# Patient Record
Sex: MALE | Race: WHITE | Employment: UNEMPLOYED | URBAN - METROPOLITAN AREA
[De-identification: names, ages, dates, MRNs, and addresses within clinical notes are randomized per-mention and may not be internally consistent; named-entity substitution may affect disease eponyms.]

---

## 2017-01-06 ENCOUNTER — OFFICE VISIT (OUTPATIENT)
Dept: CARDIOLOGY CLINIC | Age: 52
End: 2017-01-06

## 2017-01-06 VITALS
DIASTOLIC BLOOD PRESSURE: 84 MMHG | SYSTOLIC BLOOD PRESSURE: 130 MMHG | WEIGHT: 315 LBS | BODY MASS INDEX: 40.43 KG/M2 | OXYGEN SATURATION: 96 % | HEIGHT: 74 IN | HEART RATE: 71 BPM

## 2017-01-06 DIAGNOSIS — E66.01 MORBID OBESITY DUE TO EXCESS CALORIES (HCC): ICD-10-CM

## 2017-01-06 DIAGNOSIS — E78.49 OTHER HYPERLIPIDEMIA: ICD-10-CM

## 2017-01-06 DIAGNOSIS — F17.200 TOBACCO DEPENDENCE: ICD-10-CM

## 2017-01-06 DIAGNOSIS — M25.561 CHRONIC PAIN OF BOTH KNEES: ICD-10-CM

## 2017-01-06 DIAGNOSIS — M25.562 CHRONIC PAIN OF BOTH KNEES: ICD-10-CM

## 2017-01-06 DIAGNOSIS — J41.0 SIMPLE CHRONIC BRONCHITIS (HCC): ICD-10-CM

## 2017-01-06 DIAGNOSIS — R93.1 AGATSTON CORONARY ARTERY CALCIUM SCORE GREATER THAN 400: Primary | ICD-10-CM

## 2017-01-06 DIAGNOSIS — G89.29 CHRONIC PAIN OF BOTH KNEES: ICD-10-CM

## 2017-01-06 DIAGNOSIS — R06.09 DYSPNEA ON EXERTION: ICD-10-CM

## 2017-01-06 NOTE — MR AVS SNAPSHOT
Visit Information Date & Time Provider Department Dept. Phone Encounter #  
 1/6/2017  9:20 AM Shruthi Patel MD [facility] [address] [id number] Your Appointments 1/9/2017  9:00 AM  
Follow Up with Yissel Evangelista MD  
[address] ([facility]) Appt Note: follow up [place] [address] [address] [address]  
[phone]  
  
   
 [facility] [address] [address] Upcoming Health Maintenance Date Due Pneumococcal 19-64 Medium Risk (1 of 1 - PPSV23) 10/9/1984 DTaP/Tdap/Td series (1 - Tdap) 10/9/1986 FOBT Q 1 YEAR AGE 50-75 10/9/2015 Allergies as of 1/6/2017  Review Complete On: 1/6/2017 By: Edwin Fried Severity Noted Reaction Type Reactions Vicodin [Hydrocodone-acetaminophen] Medium 12/23/2015    Itching Current Immunizations  Never Reviewed No immunizations on file. Not reviewed this visit You Were Diagnosed With   
  
 Codes Comments [facility] coronary artery calcium score greater than 400    -  Primary ICD-10-CM: R93.1 ICD-9-CM: 793.2 Vitals BP Pulse Height(growth percentile) Weight(growth percentile) SpO2 BMI  
 130/84 71 6' 2\" (1.88 m) (!) 353 lb (160.1 kg) 96% 45.32 kg/m2 Smoking Status Current Every Day Smoker Vitals History BMI and BSA Data Body Mass Index Body Surface Area  
 45.32 kg/m 2 2.89 m 2 Preferred Pharmacy Pharmacy Name Phone [id number] [address] [phone] Your Updated Medication List  
  
   
This list is accurate as of: 1/6/17 10:16 AM.  Always use your most recent med list.  
  
  
  
  
 albuterol 90 mcg/actuation inhaler Commonly known as:  PROVENTIL HFA, VENTOLIN HFA, PROAIR HFA Take 2 Puffs by inhalation every six (6) hours as needed for Wheezing. Indications: CHRONIC OBSTRUCTIVE PULMONARY DISEASE  
  
 atorvastatin 40 mg tablet Commonly known as:  LIPITOR  
TAKE 1 TABLET BY MOUTH DAILY  
  
 budesonide-formoterol 80-4.5 mcg/actuation Hfaa inhaler Commonly known as:  SYMBICORT Take 2 Puffs by inhalation two (2) times a day. methocarbamol 750 mg tablet Commonly known as:  ROBAXIN Take 1 Tab by mouth four (4) times daily as needed. naproxen 500 mg tablet Commonly known as:  NAPROSYN Take 1 Tab by mouth two (2) times daily (with meals). omeprazole-sodium bicarbonate 40-1.1 mg-gram capsule Commonly known as:  Blondell Dine Take 1 Cap by mouth daily. phentermine 15 mg capsule Commonly known as:  ADIPEX_P Take 2 Caps by mouth every morning. Max Daily Amount: 30 mg.  
  
 sildenafil 20 mg tablet Commonly known as:  REVATIO  
1-2 tabs 30 minutes before activity  
  
 topiramate 25 mg tablet Commonly known as:  TOPAMAX Take 1 Tab by mouth two (2) times a day. varenicline 1 mg tablet Commonly known as:  Mccoy Salazar Take 1 Tab by mouth two (2) times a day. We Performed the Following AMB POC EKG ROUTINE W/ 12 LEADS, INTER & REP [12294 CPT(R)] To-Do List   
 01/06/2017 ECG:  CARDIAC SPECT REST AND STRESS   
  
 01/06/2017 ECG:  NUCLEAR STRESS TEST   
  
 01/27/2017 7:15 AM  
  Appointment with [facility] NUC CARD ROOM at [facility] NON-INVASIVE CARD ([phone]) This is a 2-part test which takes approximately 4 hours to complete. Please see part 2 of exam below for full instructions 01/27/2017 7:45 AM  
  Appointment with [employer] NUC CARD ROOM at [facility] NON-INVASIVE CARD ([phone]) 1-No eating or coffee after midnight  2-Do not take diabetic meds (bring with) 3-Please take all other meds unless specified by cardiology Referral Information Referral ID Referred By Referred To  
  
 [id number] Mayra Day Not Available Visits Status Start Date End Date 1 New Request 1/6/17 1/6/18 If your referral has a status of pending review or denied, additional information will be sent to support the outcome of this decision. Referral ID Referred By Referred To  
 [id number] Magda Amor Not Available Visits Status Start Date End Date 1 New Request 1/6/17 1/6/18 If your referral has a status of pending review or denied, additional information will be sent to support the outcome of this decision. Introducing [facility]! Dear Melva Santana: 
Thank you for requesting a InStitchu account. Our records indicate that you already have an active InStitchu account. You can access your account anytime at [email or web address] Did you know that you can access your hospital and ER discharge instructions at any time in InStitchu? You can also review all of your test results from your hospital stay or ER visit. Additional Information If you have questions, please visit the Frequently Asked Questions section of the InStitchu website at [email or web address] Remember, InStitchu is NOT to be used for urgent needs. For medical emergencies, dial 911. Now available from your iPhone and Android! Please provide this summary of care documentation to your next provider. Your primary care clinician is listed as Pola Lott. If you have any questions after today's visit, please call [phone].

## 2017-01-09 ENCOUNTER — OFFICE VISIT (OUTPATIENT)
Dept: FAMILY MEDICINE CLINIC | Age: 52
End: 2017-01-09

## 2017-01-09 VITALS
HEART RATE: 65 BPM | TEMPERATURE: 98.2 F | WEIGHT: 315 LBS | BODY MASS INDEX: 40.43 KG/M2 | DIASTOLIC BLOOD PRESSURE: 80 MMHG | RESPIRATION RATE: 18 BRPM | SYSTOLIC BLOOD PRESSURE: 144 MMHG | OXYGEN SATURATION: 94 % | HEIGHT: 74 IN

## 2017-01-09 DIAGNOSIS — E78.00 PURE HYPERCHOLESTEROLEMIA: ICD-10-CM

## 2017-01-09 DIAGNOSIS — L30.1 DYSHIDROTIC ECZEMA: Primary | ICD-10-CM

## 2017-01-09 DIAGNOSIS — K08.89 TOOTH PAIN: ICD-10-CM

## 2017-01-09 RX ORDER — TRIAMCINOLONE ACETONIDE 5 MG/G
OINTMENT TOPICAL
Qty: 30 G | Refills: 0 | Status: SHIPPED | OUTPATIENT
Start: 2017-01-09 | End: 2019-03-06 | Stop reason: SDUPTHER

## 2017-01-09 RX ORDER — ROSUVASTATIN CALCIUM 20 MG/1
20 TABLET, COATED ORAL
Qty: 30 TAB | Refills: 2 | Status: SHIPPED | OUTPATIENT
Start: 2017-01-09 | End: 2017-04-04 | Stop reason: SDUPTHER

## 2017-01-09 RX ORDER — OXYCODONE AND ACETAMINOPHEN 5; 325 MG/1; MG/1
1 TABLET ORAL
Qty: 14 TAB | Refills: 0 | Status: SHIPPED | OUTPATIENT
Start: 2017-01-09 | End: 2017-02-06 | Stop reason: SDUPTHER

## 2017-01-09 NOTE — PROGRESS NOTES
Skin Problem and Obesity        HPI: Girish Gray is a 46 y.o. male WHITE [place]   Here with itchy rash on face and scalp that comes and goes. He reports sometimes he can have a water blister that forms. Rash is not painful. Can occur at various times of year. Denies any ill feelings or fevers. He has been having severe tooth pain recently. Scheduled to have his teeth pulled soon. Past Medical History   Diagnosis Date    Asthma     GERD (gastroesophageal reflux disease)     Hypercholesterolemia 2010    Hyperlipidemia     Morbid obesity (HCC)        Current Outpatient Prescriptions   Medication Sig    topiramate (TOPAMAX) 25 mg tablet Take 1 Tab by mouth two (2) times a day.  atorvastatin (LIPITOR) 40 mg tablet TAKE 1 TABLET BY MOUTH DAILY    omeprazole-sodium bicarbonate (ZEGERID) 40-1.1 mg-gram capsule Take 1 Cap by mouth daily.  naproxen (NAPROSYN) 500 mg tablet Take 1 Tab by mouth two (2) times daily (with meals).  methocarbamol (ROBAXIN) 750 mg tablet Take 1 Tab by mouth four (4) times daily as needed. (Patient taking differently: Take 750 mg by mouth as needed.)    albuterol (PROVENTIL HFA, VENTOLIN HFA, PROAIR HFA) 90 mcg/actuation inhaler Take 2 Puffs by inhalation every six (6) hours as needed for Wheezing. Indications: CHRONIC OBSTRUCTIVE PULMONARY DISEASE    budesonide-formoterol (SYMBICORT) 80-4.5 mcg/actuation HFAA inhaler Take 2 Puffs by inhalation two (2) times a day.  phentermine (ADIPEX_P) 15 mg capsule Take 2 Caps by mouth every morning. Max Daily Amount: 30 mg. (Patient taking differently: Take 30 mg by mouth every morning. Indications: temporarily off this med)    varenicline (CHANTIX) 1 mg tablet Take 1 Tab by mouth two (2) times a day.  sildenafil (REVATIO) 20 mg tablet 1-2 tabs 30 minutes before activity     No current facility-administered medications for this visit.         Allergies   Allergen Reactions    Vicodin [Hydrocodone-Acetaminophen] Itching       Past Surgical History   Procedure Laterality Date    Hx orthopaedic Left 2003     left lateral meniscus amputated    Hx colonoscopy  12-     4 polyps removed       Family History   Problem Relation Age of Onset    Diabetes Mother     Stroke Mother     Arthritis-osteo Father     Hypertension Father     Arthritis-osteo Brother     Heart Attack Brother     Diabetes Maternal Grandmother        Social History     Social History    Marital status:      Spouse name: N/A    Number of children: N/A    Years of education: N/A     Occupational History    Not on file. Social History Main Topics    Smoking status: Current Every Day Smoker     Packs/day: 2.00     Years: 45.00     Types: Cigarettes     Start date: 1/1/1970     Last attempt to quit: 7/13/2016    Smokeless tobacco: Never Used    Alcohol use 0.0 oz/week     0 Standard drinks or equivalent per week      Comment: occasionally    Drug use: No    Sexual activity: Not on file     Other Topics Concern    Not on file     Social History Narrative           Visit Vitals    /80 (BP 1 Location: Right arm, BP Patient Position: Sitting)    Pulse 65    Temp 98.2 °F (36.8 °C) (Oral)    Resp 18    Ht 5' 2\" (1.575 m)    Wt 350 lb (158.8 kg)    SpO2 94%    BMI 64.02 kg/m2     Physical Exam   Constitutional: He is oriented to person, place, and time and well-developed, well-nourished, and in no distress. No distress. HENT:   Head: Normocephalic and atraumatic. Right Ear: External ear normal.   Left Ear: External ear normal.   Noted dental decay with loosening of incisors of lower right jaw line. Neurological: He is alert and oriented to person, place, and time. Gait normal.   Skin: Skin is warm and dry. He is not diaphoretic.    Micropapular rash on face and scalp with erythematous base and sloughing of skin noted c/w eczema   Psychiatric: Mood and affect normal.   Nursing note and vitals reviewed. Assesment:  1. Dyshidrotic eczema    - triamcinolone acetonide (KENALOG) 0.5 % ointment; Apply  to affected area two (2) times daily as needed for Skin Irritation. use thin layer  Dispense: 30 g; Refill: 0    2. Tooth pain    - oxyCODONE-acetaminophen (PERCOCET) 5-325 mg per tablet; Take 1 Tab by mouth two (2) times daily as needed for Pain. Max Daily Amount: 2 Tabs. Dispense: 14 Tab; Refill: 0      I have discussed the diagnosis with the patient and the intended management  The patient has received an after-visit summary and questions were answered concerning future plans. I have discussed medication usage, side effects and warnings with the patient as well. I have reviewed the plan of care with the patient, accepted their input and they are in agreement with the treatment goals.          Lauren Durbin MD

## 2017-01-09 NOTE — MR AVS SNAPSHOT
Visit Information Date & Time Provider Department Dept. Phone Encounter #  
 1/9/2017  9:00 AM [address] [facility], [address] [phone] [id number] Follow-up Instructions Return if symptoms worsen or fail to improve. Upcoming Health Maintenance Date Due Pneumococcal 19-64 Medium Risk (1 of 1 - PPSV23) 10/9/1984 DTaP/Tdap/Td series (1 - Tdap) 10/9/1986 FOBT Q 1 YEAR AGE 50-75 10/9/2015 Allergies as of 1/9/2017  Review Complete On: 1/9/2017 By: Eric Briggs, CNMT, RT, NM, ARRT Severity Noted Reaction Type Reactions Vicodin [Hydrocodone-acetaminophen] Medium 12/23/2015    Itching Current Immunizations  Never Reviewed No immunizations on file. Not reviewed this visit You Were Diagnosed With   
  
 Codes Comments Dyshidrotic eczema    -  Primary ICD-10-CM: L30.1 ICD-9-CM: 705.81 Tooth pain     ICD-10-CM: K08.89 ICD-9-CM: 525.9 Vitals BP Pulse Temp Resp Height(growth percentile) Weight(growth percentile) 144/80 (BP 1 Location: Right arm, BP Patient Position: Sitting) 65 98.2 °F (36.8 °C) (Oral) 18 6' 2\" (1.88 m) 350 lb (158.8 kg) SpO2 BMI Smoking Status 94% 44.94 kg/m2 Current Every Day Smoker Vitals History BMI and BSA Data Body Mass Index Body Surface Area 44.94 kg/m 2 2.88 m 2 Preferred Pharmacy Pharmacy Name Phone [id number] [address] [phone] Your Updated Medication List  
  
   
This list is accurate as of: 1/9/17  9:00 AM.  Always use your most recent med list.  
  
  
  
  
 albuterol 90 mcg/actuation inhaler Commonly known as:  PROVENTIL HFA, VENTOLIN HFA, PROAIR HFA Take 2 Puffs by inhalation every six (6) hours as needed for Wheezing. Indications: CHRONIC OBSTRUCTIVE PULMONARY DISEASE  
  
 atorvastatin 40 mg tablet Commonly known as:  LIPITOR TAKE 1 TABLET BY MOUTH DAILY  
  
 budesonide-formoterol 80-4.5 mcg/actuation Hfaa inhaler Commonly known as:  SYMBICORT Take 2 Puffs by inhalation two (2) times a day. methocarbamol 750 mg tablet Commonly known as:  ROBAXIN Take 1 Tab by mouth four (4) times daily as needed. naproxen 500 mg tablet Commonly known as:  NAPROSYN Take 1 Tab by mouth two (2) times daily (with meals). omeprazole-sodium bicarbonate 40-1.1 mg-gram capsule Commonly known as:  Nasrin Flash Take 1 Cap by mouth daily. oxyCODONE-acetaminophen 5-325 mg per tablet Commonly known as:  PERCOCET Take 1 Tab by mouth two (2) times daily as needed for Pain. Max Daily Amount: 2 Tabs. phentermine 15 mg capsule Commonly known as:  ADIPEX_P Take 2 Caps by mouth every morning. Max Daily Amount: 30 mg.  
  
 sildenafil 20 mg tablet Commonly known as:  REVATIO  
1-2 tabs 30 minutes before activity  
  
 topiramate 25 mg tablet Commonly known as:  TOPAMAX Take 1 Tab by mouth two (2) times a day. triamcinolone acetonide 0.5 % ointment Commonly known as:  KENALOG Apply  to affected area two (2) times daily as needed for Skin Irritation. use thin layer  
  
 varenicline 1 mg tablet Commonly known as:  Marrithong Cashing Take 1 Tab by mouth two (2) times a day. Prescriptions Printed Refills  
 oxyCODONE-acetaminophen (PERCOCET) 5-325 mg per tablet 0 Sig: Take 1 Tab by mouth two (2) times daily as needed for Pain. Max Daily Amount: 2 Tabs. Class: Print Route: Oral  
  
Prescriptions Sent to Pharmacy Refills  
 triamcinolone acetonide (KENALOG) 0.5 % ointment 0 Sig: Apply  to affected area two (2) times daily as needed for Skin Irritation. use thin layer Class: Normal  
 Pharmacy: [address] [place] [address] #: [phone] Route: Topical  
  
Follow-up Instructions Return if symptoms worsen or fail to improve. To-Do List   
 01/27/2017 7:15 AM  
  Appointment with [facility] NUC CARD ROOM at [facility] NON-INVASIVE CARD ([phone]) This is a 2-part test which takes approximately 4 hours to complete. Please see part 2 of exam below for full instructions 01/27/2017 7:45 AM  
  Appointment with [facility] NUC CARD ROOM at [facility] NON-INVASIVE CARD ([phone]) 1-No eating or coffee after midnight  2-Do not take diabetic meds (bring with) 3-Please take all other meds unless specified by cardiology Patient Instructions Use Cerave lotion regularly for the rash, petroleum jelly is even better. Use the steroid cream twice daily as needed for the rash Atopic Dermatitis: Care Instructions Your Care Instructions Atopic dermatitis (also called eczema) is a skin problem that causes intense itching and a red, raised rash. In severe cases, the rash develops clear fluidfilled blisters. The rash is not contagious. People with this condition seem to have very sensitive immune systems that are likely to react to things that cause allergies. The immune system is the body's way of fighting infection. There is no cure for atopic dermatitis, but you may be able to control it with care at home. Follow-up care is a key part of your treatment and safety. Be sure to make and go to all appointments, and call your doctor if you are having problems. It's also a good idea to know your test results and keep a list of the medicines you take. How can you care for yourself at home? · Use moisturizer at least twice a day. · If your doctor prescribes a cream, use it as directed. If your doctor prescribes other medicine, take it exactly as directed. · Wash the affected area with water only. Soap can make dryness and itching worse. Pat dry. · Apply a moisturizer after bathing. Use a cream such as Lubriderm, Moisturel, or Cetaphil that does not irritate the skin or cause a rash. Apply the cream while your skin is still damp after lightly drying with a towel. · Use cold, wet cloths to reduce itching. · Keep cool, and stay out of the sun. · If itching affects your normal activities, an over-the-counter antihistamine, such as diphenhydramine (Benadryl) or loratadine (Claritin) may help. Read and follow all instructions on the label. When should you call for help? Call your doctor now or seek immediate medical care if: 
· Your rash gets worse and you have a fever. · You have new blisters or bruises, or the rash spreads and looks like a sunburn. · You have signs of infection, such as: 
¨ Increased pain, swelling, warmth, or redness. ¨ Red streaks leading from the rash. ¨ Pus draining from the rash. ¨ A fever. · You have crusting or oozing sores. · You have joint aches or body aches along with your rash. Watch closely for changes in your health, and be sure to contact your doctor if: 
· Your rash does not clear up after 2 to 3 weeks of home treatment. · Itching interferes with your sleep or daily activities. Where can you learn more? Go to http://nohelia-martina.info/. Enter G409 in the search box to learn more about \"Atopic Dermatitis: Care Instructions. \" Current as of: February 5, 2016 Content Version: 11.1 © 6223-9956 ED01. Care instructions adapted under license by [employer] (which disclaims liability or warranty for this information). If you have questions about a medical condition or this instruction, always ask your healthcare professional. [place] [address] any warranty or liability for your use of this information. Introducing [facility]! Dear Zoraida Taylor: 
Thank you for requesting a ERUCES account. Our records indicate that you already have an active ERUCES account. You can access your account anytime at [email or web address] Did you know that you can access your hospital and ER discharge instructions at any time in Pokelabo? You can also review all of your test results from your hospital stay or ER visit. Additional Information If you have questions, please visit the Frequently Asked Questions section of the Pokelabo website at [email or web address] Remember, Pokelabo is NOT to be used for urgent needs. For medical emergencies, dial 911. Now available from your iPhone and Android! Please provide this summary of care documentation to your next provider. Your primary care clinician is listed as Yissel Evangelista. If you have any questions after today's visit, please call [phone].

## 2017-01-09 NOTE — PATIENT INSTRUCTIONS
Use Cerave lotion regularly for the rash, petroleum jelly is even better. Use the steroid cream twice daily as needed for the rash    Atopic Dermatitis: Care Instructions  Your Care Instructions  Atopic dermatitis (also called eczema) is a skin problem that causes intense itching and a red, raised rash. In severe cases, the rash develops clear fluid-filled blisters. The rash is not contagious. People with this condition seem to have very sensitive immune systems that are likely to react to things that cause allergies. The immune system is the body's way of fighting infection. There is no cure for atopic dermatitis, but you may be able to control it with care at home. Follow-up care is a key part of your treatment and safety. Be sure to make and go to all appointments, and call your doctor if you are having problems. It's also a good idea to know your test results and keep a list of the medicines you take. How can you care for yourself at home? · Use moisturizer at least twice a day. · If your doctor prescribes a cream, use it as directed. If your doctor prescribes other medicine, take it exactly as directed. · Wash the affected area with water only. Soap can make dryness and itching worse. Pat dry. · Apply a moisturizer after bathing. Use a cream such as Lubriderm, Moisturel, or Cetaphil that does not irritate the skin or cause a rash. Apply the cream while your skin is still damp after lightly drying with a towel. · Use cold, wet cloths to reduce itching. · Keep cool, and stay out of the sun. · If itching affects your normal activities, an over-the-counter antihistamine, such as diphenhydramine (Benadryl) or loratadine (Claritin) may help. Read and follow all instructions on the label. When should you call for help? Call your doctor now or seek immediate medical care if:  · Your rash gets worse and you have a fever.   · You have new blisters or bruises, or the rash spreads and looks like a sunburn. · You have signs of infection, such as:  ¨ Increased pain, swelling, warmth, or redness. ¨ Red streaks leading from the rash. ¨ Pus draining from the rash. ¨ A fever. · You have crusting or oozing sores. · You have joint aches or body aches along with your rash. Watch closely for changes in your health, and be sure to contact your doctor if:  · Your rash does not clear up after 2 to 3 weeks of home treatment. · Itching interferes with your sleep or daily activities. Where can you learn more? Go to http://nohelia-martina.info/. Enter M347 in the search box to learn more about \"Atopic Dermatitis: Care Instructions. \"  Current as of: February 5, 2016  Content Version: 11.1  © 6211-9177 [employer]. Care instructions adapted under license by [employer] (which disclaims liability or warranty for this information). If you have questions about a medical condition or this instruction, always ask your healthcare professional. [place] [address] any warranty or liability for your use of this information.

## 2017-01-09 NOTE — PROGRESS NOTES
Jyotsna Patrick is a 46 y.o. male here for follow up. Has been to Cardiology. Has spots on face that needs looks at, they do itch and they come and go.

## 2017-01-10 PROBLEM — R06.09 DYSPNEA ON EXERTION: Status: ACTIVE | Noted: 2017-01-10

## 2017-01-10 NOTE — PROGRESS NOTES
PATIENT NAME: Eloy Weiner         46 y.o.      1965              DATE:1/6/2017    REASON FOR VISIT:abnormal coronary  Calcium score    HISTORY OF PRESENT ILLNESS: Cardiac CT was obtained recently for risk factor stratification. Coronary calcium score was 1600. Referred for evaluation. Denies a prior history of coronary artery disease. He is chronically short of breath on exertion. He does smoke cigarettes heavily for years and has a history of chronic obstructive pulmonary disease. Denies chest pain. Denies orthopnea and paroxysmal nocturnal dyspnea. Denies palpitation, syncope, presyncope. Denies edema in the lower extremities. The patient is not hypertensive or diabetic. He does take a statin for hyperlipidemia. He is morbidly obese.     PAST MEDICAL HISTORY:   Past Medical History:    Asthma                                                        GERD (gastroesophageal reflux disease)                        Hypercholesterolemia                            2010          Hyperlipidemia                                                Morbid obesity ([facility] [address]                                          PAST SURGICAL HISTORY:   Past Surgical History:    HX ORTHOPAEDIC                                  Left 2003            Comment:left lateral meniscus amputated    HX COLONOSCOPY                                   12-      Comment:4 polyps removed      SOCIAL HISTORY:  Social History    Marital status:              Spouse name:                       Years of education:                 Number of children:               Social History Main Topics    Smoking status: Current Every Day Smoker                                                     Packs/day: 2.00      Years: 45.00          Types: Cigarettes       Start date: 1/1/1970       Last attempt to quit: 7/13/2016    Smokeless status: Never Used                        Alcohol use: Yes           0.0 oz/week       0 Standard drinks or equivalent per week       Comment: occasionally    Drug use: No                ALLERGIES:    -- Vicodin [Hydrocodone-Acetaminophen] -- Itching     CURRENT MEDICATIONS:   Current Outpatient Prescriptions:  topiramate (TOPAMAX) 25 mg tablet, Take 1 Tab by mouth two (2) times a day. omeprazole-sodium bicarbonate (ZEGERID) 40-1.1 mg-gram capsule, Take 1 Cap by mouth daily. naproxen (NAPROSYN) 500 mg tablet, Take 1 Tab by mouth two (2) times daily (with meals). methocarbamol (ROBAXIN) 750 mg tablet, Take 1 Tab by mouth four (4) times daily as needed. (Patient taking differently: Take 750 mg by mouth as needed.)  sildenafil (REVATIO) 20 mg tablet, 1-2 tabs 30 minutes before activity  albuterol (PROVENTIL HFA, VENTOLIN HFA, PROAIR HFA) 90 mcg/actuation inhaler, Take 2 Puffs by inhalation every six (6) hours as needed for Wheezing. Indications: CHRONIC OBSTRUCTIVE PULMONARY DISEASE  oxyCODONE-acetaminophen (PERCOCET) 5-325 mg per tablet, Take 1 Tab by mouth two (2) times daily as needed for Pain. Max Daily Amount: 2 Tabs. triamcinolone acetonide (KENALOG) 0.5 % ointment, Apply  to affected area two (2) times daily as needed for Skin Irritation. use thin layer  rosuvastatin (CRESTOR) 20 mg tablet, Take 1 Tab by mouth nightly. phentermine (ADIPEX_P) 15 mg capsule, Take 2 Caps by mouth every morning. Max Daily Amount: 30 mg. (Patient taking differently: Take 30 mg by mouth every morning. Indications: temporarily off this med)  varenicline (CHANTIX) 1 mg tablet, Take 1 Tab by mouth two (2) times a day. budesonide-formoterol (SYMBICORT) 80-4.5 mcg/actuation HFAA inhaler, Take 2 Puffs by inhalation two (2) times a day. No current facility-administered medications for this visit.        REVIEW of SYSTEMS:History obtained from chart review and the patient  General ROS: negative for - weight gain or weight loss  Hematological and Lymphatic ROS: negative for - bleeding problems  Respiratory ROS: Please see history of present illness  Cardiovascular ROS: Please see history of present illness  Musculoskeletal  Unable to ambulate on treadmill because of knee discomfort  Neurological ROS: no TIA or stroke symptoms     PHYSICAL EXAMINATION:   /84  Pulse 71  Ht 6' 2\" (1.88 m)  Wt (!) 160.1 kg (353 lb)  SpO2 96%  BMI 45.32 kg/m2  BP Readings from Last 3 Encounters:  01/09/17 : 144/80  01/06/17 : 130/84  12/27/16 : 120/88    Pulse Readings from Last 3 Encounters:  01/09/17 : 65  01/06/17 : 71  12/27/16 : 83    Wt Readings from Last 3 Encounters:  01/09/17 : 158.8 kg (350 lb)  01/06/17 : (!) 160.1 kg (353 lb)  12/27/16 : (!) 159.8 kg (352 lb 6.4 oz)    Gen. : obese white male NAD. HEENT: Sclera clear. Mucous membranes pink and moist.  Neck: No jugular venous distention. Carotid upstrokes 2+ without bruits. Chest: Scattered wheezes. Heart: PMI not palpable. Regular rhythm. No murmur or gallop. Abdomen: obese unable to palpate abdominal aorta. Extremities: No edema. .  Skin: Warm and dry. No stasis changes. Neuro: Alert, oriented, speech WNL, no facial asymmetry. Gait WNL. EKG: Within normal limits    IMPRESSION:   Abnormal coronary calcium score  Hyperlipidemia  Tobacco abuse  Positive family history for coronary artery disease  Exertional shortness of breath unknown etiology  Degenerative joint disease both knees  Chronic obstructive pulmonary disease  Morbid obesity    PLAN:  The patient needs to be screened for coronary artery disease. He will not be able to ambulate on a treadmill because of his degenerative joint disease and shortness of breath. Have ordered pharmacologic stress testing and Cardiolite. The diagnoses and plan were discussed with patient and spouse. All questions answered. Plan of care agreed to by all concerned. Yadi Graham MD       ,

## 2017-01-13 DIAGNOSIS — K21.9 GASTROESOPHAGEAL REFLUX DISEASE WITHOUT ESOPHAGITIS: ICD-10-CM

## 2017-01-13 RX ORDER — OMEPRAZOLE AND SODIUM BICARBONATE 40; 1100 MG/1; MG/1
CAPSULE ORAL
Qty: 30 CAP | Refills: 2 | Status: SHIPPED | OUTPATIENT
Start: 2017-01-13 | End: 2017-02-06 | Stop reason: ALTCHOICE

## 2017-01-25 ENCOUNTER — TELEPHONE (OUTPATIENT)
Dept: FAMILY MEDICINE CLINIC | Age: 52
End: 2017-01-25

## 2017-01-25 NOTE — TELEPHONE ENCOUNTER
Patient's wife called stating  would like a different bariatric surgeon. The one at [facility] will not preform the surgery until patient has not smoked in 3 months. Mr. Dago Garduno states he has done everything asked of him by trying meds and going to the nutritionist. Nothing he has tried has worked to help him stop smoking. Patient is very upset and frustrated. Please advise.

## 2017-01-27 ENCOUNTER — HOSPITAL ENCOUNTER (OUTPATIENT)
Dept: NON INVASIVE DIAGNOSTICS | Age: 52
Discharge: HOME | End: 2017-01-27
Payer: COMMERCIAL

## 2017-01-27 DIAGNOSIS — R93.1 AGATSTON CORONARY ARTERY CALCIUM SCORE GREATER THAN 400: ICD-10-CM

## 2017-01-27 LAB
ATTENDING PHYSICIAN, CST07: NORMAL
DIAGNOSIS, 93000: NORMAL
DUKE TM SCORE RESULT, CST14: NORMAL
DUKE TREADMILL SCORE, CST13: NORMAL
ECG INTERP BEFORE EX, CST11: NORMAL
ECG INTERP DURING EX, CST12: NORMAL
FUNCTIONAL CAPACITY, CST17: NORMAL
KNOWN CARDIAC CONDITION, CST08: NORMAL
MAX. DIASTOLIC BP, CST04: 60 MMHG
MAX. HEART RATE, CST05: 85 BPM
MAX. SYSTOLIC BP, CST03: 162 MMHG
OVERALL BP RESPONSE TO EXERCISE, CST16: NORMAL
OVERALL HR RESPONSE TO EXERCISE, CST15: NORMAL
PEAK EX METS, CST10: 1 METS
PROTOCOL NAME, CST01: NORMAL
TEST INDICATION, CST09: NORMAL

## 2017-01-27 PROCEDURE — A9500 TC99M SESTAMIBI: HCPCS

## 2017-01-27 PROCEDURE — 74011250636 HC RX REV CODE- 250/636

## 2017-01-27 PROCEDURE — 78452 HT MUSCLE IMAGE SPECT MULT: CPT

## 2017-01-27 PROCEDURE — 93017 CV STRESS TEST TRACING ONLY: CPT

## 2017-01-27 RX ORDER — SODIUM CHLORIDE 9 MG/ML
250 INJECTION, SOLUTION INTRAVENOUS ONCE
Status: COMPLETED | OUTPATIENT
Start: 2017-01-27 | End: 2017-01-27

## 2017-01-27 RX ADMIN — SODIUM CHLORIDE 250 ML/HR: 900 INJECTION, SOLUTION INTRAVENOUS at 09:10

## 2017-01-27 RX ADMIN — REGADENOSON 0.4 MG: 0.08 INJECTION, SOLUTION INTRAVENOUS at 09:10

## 2017-01-27 NOTE — PROCEDURES
[address] STRESS    Name:  Skip Azar  MR#:  [id number]  :  1965  Account #:  [de-identified]  Date of Adm:  2017  Date of Service:  2017      PERFORMING PHYSICIAN: Avril Chacon MD    REQUESTING PHYSICIAN: Mayra Haider MD    DATE OF PROCEDURE: 2017    CLINICAL HISTORY: A 20-year-old man with no known coronary  artery disease. CARDIAC RISK FACTORS INCLUDE: Tobacco.    CURRENT SYMPTOMS INCLUDE: Elevated calcium scoring. PROCEDURE IN DETAIL: After obtaining appropriate informed  consent, pharmacologic stress testing was performed using Lexiscan  0.4 mg intravenously. The patient developed no unusual symptoms. REASON FOR TERMINATION: End of protocol reached. The resting  electrocardiogram was normal sinus rhythm, normal. The  stress electrocardiogram, normal sinus rhythm, normal.    MYOCARDIAL PERFUSION IMAGING: Performed at rest 30 minutes  following injection of 10.75 mCi of sestamibi. At peak pharmacologic  effect, the patient was injected with 33.0 mCi of sestamibi. Gated post  stress tomographic imaging was performed 45 minutes after stress. FINDINGS: The overall quality of the study was fair. Splanchnic activity  was mild. Soft tissue attenuation was mild. Left ventricular cavity was noted to be normal in size in the rest and  stress studies. The right ventricle was unremarkable. Stress SPECT  imaging demonstrated homogeneous uptake of cardiac tracer  throughout the myocardium. The rest images were similar. Gated  SPECT imaging showed normal wall motion. The left ventricular  ejection fraction was calculated to be 60%. IMPRESSION  1. Myocardial perfusion imaging is normal.  2. There is no area of prior scarring or ongoing ischemia. 3. Overall left ventricular systolic function was normal without regional  wall motion abnormalities. 4. Low risk for ischemia.     FOLLOWUP PLAN/RECOMMENDATION: Per  Santos.         MD BRYAN Arauz / JORDIN  D:  01/27/2017   15:19  T:  01/27/2017   17:55  Job #:  [id number]

## 2017-01-30 NOTE — TELEPHONE ENCOUNTER
Sent message to Dr Kori Castellano, [facility] bariatric surgeon to see if she could see Mr Dago Garduno.     Krystal Grove MD

## 2017-01-31 ENCOUNTER — TELEPHONE (OUTPATIENT)
Dept: FAMILY MEDICINE CLINIC | Age: 52
End: 2017-01-31

## 2017-01-31 NOTE — TELEPHONE ENCOUNTER
Dr Rajwinder Mtz stated that nicotine use is a contraindication to GB surgery, so that means no surgeon will do the surgery.     Thanks,  Rosie Leon MD

## 2017-01-31 NOTE — TELEPHONE ENCOUNTER
Wife called to inquire about [facility] nicotine policy for bariatric surgery. She was made aware of their 3 month nicotine free policy. She asked that we try [facility]. Wife was upset on the phone stating her frustration with  and how his weight is interfering in their personal life. She is aware that we will try [facility] and will call her back.

## 2017-02-06 ENCOUNTER — OFFICE VISIT (OUTPATIENT)
Dept: FAMILY MEDICINE CLINIC | Age: 52
End: 2017-02-06

## 2017-02-06 VITALS
TEMPERATURE: 96 F | BODY MASS INDEX: 40.43 KG/M2 | WEIGHT: 315 LBS | HEART RATE: 86 BPM | OXYGEN SATURATION: 96 % | HEIGHT: 74 IN | RESPIRATION RATE: 18 BRPM | DIASTOLIC BLOOD PRESSURE: 83 MMHG | SYSTOLIC BLOOD PRESSURE: 166 MMHG

## 2017-02-06 DIAGNOSIS — K21.9 GASTROESOPHAGEAL REFLUX DISEASE WITHOUT ESOPHAGITIS: ICD-10-CM

## 2017-02-06 DIAGNOSIS — L02.415 CUTANEOUS ABSCESS OF RIGHT LOWER EXTREMITY: Primary | ICD-10-CM

## 2017-02-06 RX ORDER — OXYCODONE AND ACETAMINOPHEN 5; 325 MG/1; MG/1
1 TABLET ORAL
Qty: 14 TAB | Refills: 0 | Status: SHIPPED | OUTPATIENT
Start: 2017-02-06 | End: 2017-05-11 | Stop reason: SDUPTHER

## 2017-02-06 RX ORDER — SUCRALFATE 1 G/10ML
1 SUSPENSION ORAL 2 TIMES DAILY
Qty: 414 ML | Refills: 1 | Status: SHIPPED | OUTPATIENT
Start: 2017-02-06 | End: 2017-04-05

## 2017-02-06 RX ORDER — OMEPRAZOLE 40 MG/1
40 CAPSULE, DELAYED RELEASE ORAL DAILY
Qty: 30 CAP | Refills: 1 | Status: SHIPPED | OUTPATIENT
Start: 2017-02-06 | End: 2017-04-04 | Stop reason: SDUPTHER

## 2017-02-06 RX ORDER — SULFAMETHOXAZOLE AND TRIMETHOPRIM 800; 160 MG/1; MG/1
1 TABLET ORAL 2 TIMES DAILY
Qty: 14 TAB | Refills: 0 | Status: SHIPPED | OUTPATIENT
Start: 2017-02-06 | End: 2017-02-13

## 2017-02-06 NOTE — MR AVS SNAPSHOT
Visit Information Date & Time Provider Department Dept. Phone Encounter #  
 2/6/2017  1:45 PM [phone] [id number] Follow-up Instructions Return in about 2 days (around 2/8/2017) for recheck skin abscess. Your Appointments 2/8/2017  8:15 AM  
Follow Up with Juanjo Donovan MD  
[facility]) Appt Note: Return in about 2 days (around 2/8/2017) for recheck skin abscess. [facility] [address] [address]  
[phone]  
  
   
 [facility] [address] [address] Upcoming Health Maintenance Date Due Pneumococcal 19-64 Medium Risk (1 of 1 - PPSV23) 10/9/1984 DTaP/Tdap/Td series (1 - Tdap) 10/9/1986 FOBT Q 1 YEAR AGE 50-75 10/9/2015 Allergies as of 2/6/2017  Review Complete On: 2/6/2017 By: Jolanta Howe, JOLENEMT, RT, NM, ARRT Severity Noted Reaction Type Reactions Vicodin [Hydrocodone-acetaminophen] Medium 12/23/2015    Itching Current Immunizations  Never Reviewed No immunizations on file. Not reviewed this visit You Were Diagnosed With   
  
 Codes Comments Cutaneous abscess of right lower extremity    -  Primary ICD-10-CM: P42.166 ICD-9-CM: 682.6 Gastroesophageal reflux disease without esophagitis     ICD-10-CM: K21.9 ICD-9-CM: 530.81 Vitals BP Pulse Temp Resp Height(growth percentile) Weight(growth percentile) 166/83 (BP 1 Location: Right arm, BP Patient Position: Sitting) 86 96 °F (35.6 °C) (Oral) 18 6' 2\" (1.88 m) (!) 352 lb (159.7 kg) SpO2 BMI Smoking Status 96% 45.19 kg/m2 Current Every Day Smoker Vitals History BMI and BSA Data Body Mass Index Body Surface Area  
 45.19 kg/m 2 2.89 m 2 Preferred Pharmacy Pharmacy Name Phone  [id number] [address] [phone] Your Updated Medication List  
  
   
This list is accurate as of: 2/6/17  2:25 PM.  Always use your most recent med list.  
  
  
  
  
 albuterol 90 mcg/actuation inhaler Commonly known as:  PROVENTIL HFA, VENTOLIN HFA, PROAIR HFA Take 2 Puffs by inhalation every six (6) hours as needed for Wheezing. Indications: CHRONIC OBSTRUCTIVE PULMONARY DISEASE  
  
 budesonide-formoterol 80-4.5 mcg/actuation Hfaa inhaler Commonly known as:  SYMBICORT Take 2 Puffs by inhalation two (2) times a day. methocarbamol 750 mg tablet Commonly known as:  ROBAXIN Take 1 Tab by mouth four (4) times daily as needed. naproxen 500 mg tablet Commonly known as:  NAPROSYN Take 1 Tab by mouth two (2) times daily (with meals). omeprazole-sodium bicarbonate 40-1.1 mg-gram capsule Commonly known as:  ZEGERID  
TAKE 1 CAPSULE BY MOUTH DAILY  
  
 oxyCODONE-acetaminophen 5-325 mg per tablet Commonly known as:  PERCOCET Take 1 Tab by mouth two (2) times daily as needed for Pain. Max Daily Amount: 2 Tabs. phentermine 15 mg capsule Commonly known as:  ADIPEX_P Take 2 Caps by mouth every morning. Max Daily Amount: 30 mg.  
  
 rosuvastatin 20 mg tablet Commonly known as:  CRESTOR Take 1 Tab by mouth nightly. sildenafil 20 mg tablet Commonly known as:  REVATIO  
1-2 tabs 30 minutes before activity  
  
 topiramate 25 mg tablet Commonly known as:  TOPAMAX Take 1 Tab by mouth two (2) times a day. triamcinolone acetonide 0.5 % ointment Commonly known as:  KENALOG Apply  to affected area two (2) times daily as needed for Skin Irritation. use thin layer  
  
 trimethoprim-sulfamethoxazole 160-800 mg per tablet Commonly known as:  BACTRIM DS, SEPTRA DS Take 1 Tab by mouth two (2) times a day for 7 days. varenicline 1 mg tablet Commonly known as:  Lorry Balzarine Take 1 Tab by mouth two (2) times a day. Prescriptions Printed Refills  
 oxyCODONE-acetaminophen (PERCOCET) 5-325 mg per tablet 0 Sig: Take 1 Tab by mouth two (2) times daily as needed for Pain. Max Daily Amount: 2 Tabs. Class: Print Route: Oral  
  
Prescriptions Sent to Pharmacy Refills  
 trimethoprim-sulfamethoxazole (BACTRIM DS, SEPTRA DS) 160-800 mg per tablet 0 Sig: Take 1 Tab by mouth two (2) times a day for 7 days. Class: Normal  
 Pharmacy: [address] [place], [place] [address] [facility] #: [phone] Route: Oral  
  
Follow-up Instructions Return in about 2 days (around 2/8/2017) for recheck skin abscess. Patient Instructions Keep wound covered and dry. Use warm compress for 10 minutes every 2-3 hours. Follow up in 2 days Skin Abscess: Care Instructions Your Care Instructions A skin abscess is a bacterial infection that forms a pocket of pus. A boil is a kind of skin abscess. The doctor may have cut an opening in the abscess so that the pus can drain out. You may have gauze in the cut so that the abscess will stay open and keep draining. You may need antibiotics. You will need to follow up with your doctor to make sure the infection has gone away. The doctor has checked you carefully, but problems can develop later. If you notice any problems or new symptoms, get medical treatment right away. Follow-up care is a key part of your treatment and safety. Be sure to make and go to all appointments, and call your doctor if you are having problems. It's also a good idea to know your test results and keep a list of the medicines you take. How can you care for yourself at home? · Apply warm and dry compresses, a heating pad set on low, or a hot water bottle 3 or 4 times a day for pain. Keep a cloth between the heat source and your skin. · If your doctor prescribed antibiotics, take them as directed.  Do not stop taking them just because you feel better. You need to take the full course of antibiotics. · Take pain medicines exactly as directed. ¨ If the doctor gave you a prescription medicine for pain, take it as prescribed. ¨ If you are not taking a prescription pain medicine, ask your doctor if you can take an over-the-counter medicine. · Keep your bandage clean and dry. Change the bandage whenever it gets wet or dirty, or at least one time a day. · If the abscess was packed with gauze: 
¨ Keep follow-up appointments to have the gauze changed or removed. If the doctor instructed you to remove the gauze, gently pull out all of the gauze when your doctor tells you to. ¨ After the gauze is removed, soak the area in warm water for 15 to 20 minutes 2 times a day, until the wound closes. When should you call for help? Call your doctor now or seek immediate medical care if: 
· You have signs of worsening infection, such as: 
¨ Increased pain, swelling, warmth, or redness. ¨ Red streaks leading from the infected skin. ¨ Pus draining from the wound. ¨ A fever. Watch closely for changes in your health, and be sure to contact your doctor if: 
· You do not get better as expected. Where can you learn more? Go to http://nohelia-martina.info/. Enter N792 in the search box to learn more about \"Skin Abscess: Care Instructions. \" Current as of: February 5, 2016 Content Version: 11.1 © 8106-2120 [employer]. Care instructions adapted under license by [employer] (which disclaims liability or warranty for this information). If you have questions about a medical condition or this instruction, always ask your healthcare professional. [place] [address] any warranty or liability for your use of this information. Introducing [facility] HEALTH [facility]! Dear Roxy Ramesh: 
Thank you for requesting a [employer] account.   Our records indicate that you already have an active [employer] account. You can access your account anytime at [email or web address] Did you know that you can access your hospital and ER discharge instructions at any time in [employer]? You can also review all of your test results from your hospital stay or ER visit. Additional Information If you have questions, please visit the Frequently Asked Questions section of the [employer] website at [email or web address] Remember, [employer] is NOT to be used for urgent needs. For medical emergencies, dial 911. Now available from your iPhone and Android! Please provide this summary of care documentation to your next provider. Your primary care clinician is listed as Yissel Evangelista. If you have any questions after today's visit, please call [phone].

## 2017-02-06 NOTE — PROGRESS NOTES
Skin Problem        HPI: Marli Domingo is a 46 y.o. male WHITE OR   Here with painful lump in right groin. He reports it started 2 days ago. Has progressively gotten bigger, very tender. He has used warm compresses hoping this would help, it has not. He has had these in the past. He denies any fevers. Past Medical History   Diagnosis Date    Asthma     GERD (gastroesophageal reflux disease)     Hypercholesterolemia 2010    Hyperlipidemia     Morbid obesity (HCC)        Current Outpatient Prescriptions   Medication Sig    oxyCODONE-acetaminophen (PERCOCET) 5-325 mg per tablet Take 1 Tab by mouth two (2) times daily as needed for Pain. Max Daily Amount: 2 Tabs.  trimethoprim-sulfamethoxazole (BACTRIM DS, SEPTRA DS) 160-800 mg per tablet Take 1 Tab by mouth two (2) times a day for 7 days.  omeprazole (PRILOSEC) 40 mg capsule Take 1 Cap by mouth daily.  sucralfate (CARAFATE) 100 mg/mL suspension Take 10 mL by mouth two (2) times a day.  triamcinolone acetonide (KENALOG) 0.5 % ointment Apply  to affected area two (2) times daily as needed for Skin Irritation. use thin layer    rosuvastatin (CRESTOR) 20 mg tablet Take 1 Tab by mouth nightly.  phentermine (ADIPEX_P) 15 mg capsule Take 2 Caps by mouth every morning. Max Daily Amount: 30 mg. (Patient taking differently: Take 30 mg by mouth every morning. Indications: temporarily off this med)    naproxen (NAPROSYN) 500 mg tablet Take 1 Tab by mouth two (2) times daily (with meals).  methocarbamol (ROBAXIN) 750 mg tablet Take 1 Tab by mouth four (4) times daily as needed. (Patient taking differently: Take 750 mg by mouth as needed.)    sildenafil (REVATIO) 20 mg tablet 1-2 tabs 30 minutes before activity    albuterol (PROVENTIL HFA, VENTOLIN HFA, PROAIR HFA) 90 mcg/actuation inhaler Take 2 Puffs by inhalation every six (6) hours as needed for Wheezing.  Indications: CHRONIC OBSTRUCTIVE PULMONARY DISEASE    budesonide-formoterol (SYMBICORT) 80-4.5 mcg/actuation HFAA inhaler Take 2 Puffs by inhalation two (2) times a day.  topiramate (TOPAMAX) 25 mg tablet Take 1 Tab by mouth two (2) times daily (with meals).  varenicline (CHANTIX) 1 mg tablet Take 1 Tab by mouth two (2) times a day. No current facility-administered medications for this visit. Allergies   Allergen Reactions    Vicodin [Hydrocodone-Acetaminophen] Itching       Past Surgical History   Procedure Laterality Date    Hx orthopaedic Left 2003     left lateral meniscus amputated    Hx colonoscopy  12-     4 polyps removed       Family History   Problem Relation Age of Onset    Diabetes Mother     Stroke Mother     Arthritis-osteo Father     Hypertension Father     Arthritis-osteo Brother     Heart Attack Brother     Diabetes Maternal Grandmother        Social History     Social History    Marital status:      Spouse name: N/A    Number of children: N/A    Years of education: N/A     Occupational History    Not on file. Social History Main Topics    Smoking status: Current Every Day Smoker     Packs/day: 2.00     Years: 45.00     Types: Cigarettes     Start date: 1/1/1970     Last attempt to quit: 7/13/2016    Smokeless tobacco: Never Used    Alcohol use 0.0 oz/week     0 Standard drinks or equivalent per week      Comment: occasionally    Drug use: No    Sexual activity: Not on file     Other Topics Concern    Not on file     Social History Narrative         Visit Vitals    /83 (BP 1 Location: Right arm, BP Patient Position: Sitting)    Pulse 86    Temp 96 °F (35.6 °C) (Oral)    Resp 18    Ht 6' 2\" (1.88 m)    Wt (!) 352 lb (159.7 kg)    SpO2 96%    BMI 45.19 kg/m2       Physical Exam   Constitutional: He is oriented to person, place, and time. He appears well-developed and well-nourished. HENT:   Head: Normocephalic and atraumatic.    Right Ear: External ear normal.   Left Ear: External ear normal. Neurological: He is alert and oriented to person, place, and time. Skin: Skin is warm. Purpura: large 4 x6 cm indurated, round lesion in right inner groin, warm, TTP, central area of fluctuance. Psychiatric: He has a normal mood and affect. Nursing note and vitals reviewed. [facility]  OFFICE PROCEDURE PROGRESS NOTE        Chart reviewed for the following:   Suzan Montemayor MD, have reviewed the History, Physical and updated the Allergic reactions for [place] performed immediately prior to start of procedure:   Suzan Montemayor MD, have performed the following reviews on Rupert Oliva prior to the start of the procedure:            * Patient was identified by name and date of birth   * Agreement on procedure being performed was verified  * Patient Allergies Verified  * Risks and Benefits explained to the patient  * Procedure site verified and marked as necessary  * Patient was positioned for comfort  * Consent was signed and verified     Time: 5940      Date of procedure: 2/6/2017    Procedure performed by:  Yissel Evangelista MD    Provider assisted by: Ethan Matt RN    Patient assisted by: self    How tolerated by patient: tolerated the procedure well with no complications    Pre-procedure pain level 10, Post-Procedure pain level 8    Comments: none    Patient education provided     Post procedure care instructions document provided to patient    After obtaining consent,skin was cleansed with betadine solution, skin sprayed with ethyl chloride and anesthetized with intradermal lidocain1% with epinephrine. 3 mL used to obtain skin anesthesia. Skin again sprayed with ethyl chloride spray and 1 cm stab incision with #10 blade made. 3mL of seropurulent material was expressed from wound. Wound was packed with 6cm on 1/4\" iodoform guaze. Wound covered with gauze dressing and paper tape. Patient tolerated procedure well. Assesment:  1. Cutaneous abscess of right lower extremity  Continue to apply warm compress, squeeze edges, keep packing in place  - oxyCODONE-acetaminophen (PERCOCET) 5-325 mg per tablet; Take 1 Tab by mouth two (2) times daily as needed for Pain. Max Daily Amount: 2 Tabs. Dispense: 14 Tab; Refill: 0  - trimethoprim-sulfamethoxazole (BACTRIM DS, SEPTRA DS) 160-800 mg per tablet; Take 1 Tab by mouth two (2) times a day for 7 days. Dispense: 14 Tab; Refill: 0    2. Gastroesophageal reflux disease without esophagitis    - omeprazole (PRILOSEC) 40 mg capsule; Take 1 Cap by mouth daily. Dispense: 30 Cap; Refill: 1  - sucralfate (CARAFATE) 100 mg/mL suspension; Take 10 mL by mouth two (2) times a day. Dispense: 414 mL; Refill: 1        I have discussed the diagnosis with the patient and the intended management  The patient has received an after-visit summary and questions were answered concerning future plans. I have discussed medication usage, side effects and warnings with the patient as well. I have reviewed the plan of care with the patient, accepted their input and they are in agreement with the treatment goals. Follow-up Disposition:  Return in about 2 days (around 2/8/2017) for recheck skin abscess.       Yissel Evangelista MD                .

## 2017-02-06 NOTE — PATIENT INSTRUCTIONS
Keep wound covered and dry. Use warm compress for 10 minutes every 2-3 hours. Follow up in 2 days    Skin Abscess: Care Instructions  Your Care Instructions    A skin abscess is a bacterial infection that forms a pocket of pus. A boil is a kind of skin abscess. The doctor may have cut an opening in the abscess so that the pus can drain out. You may have gauze in the cut so that the abscess will stay open and keep draining. You may need antibiotics. You will need to follow up with your doctor to make sure the infection has gone away. The doctor has checked you carefully, but problems can develop later. If you notice any problems or new symptoms, get medical treatment right away. Follow-up care is a key part of your treatment and safety. Be sure to make and go to all appointments, and call your doctor if you are having problems. It's also a good idea to know your test results and keep a list of the medicines you take. How can you care for yourself at home? · Apply warm and dry compresses, a heating pad set on low, or a hot water bottle 3 or 4 times a day for pain. Keep a cloth between the heat source and your skin. · If your doctor prescribed antibiotics, take them as directed. Do not stop taking them just because you feel better. You need to take the full course of antibiotics. · Take pain medicines exactly as directed. ¨ If the doctor gave you a prescription medicine for pain, take it as prescribed. ¨ If you are not taking a prescription pain medicine, ask your doctor if you can take an over-the-counter medicine. · Keep your bandage clean and dry. Change the bandage whenever it gets wet or dirty, or at least one time a day. · If the abscess was packed with gauze:  ¨ Keep follow-up appointments to have the gauze changed or removed. If the doctor instructed you to remove the gauze, gently pull out all of the gauze when your doctor tells you to.   ¨ After the gauze is removed, soak the area in warm water for 15 to 20 minutes 2 times a day, until the wound closes. When should you call for help? Call your doctor now or seek immediate medical care if:  · You have signs of worsening infection, such as:  ¨ Increased pain, swelling, warmth, or redness. ¨ Red streaks leading from the infected skin. ¨ Pus draining from the wound. ¨ A fever. Watch closely for changes in your health, and be sure to contact your doctor if:  · You do not get better as expected. Where can you learn more? Go to http://nohelia-martina.info/. Enter E653 in the search box to learn more about \"Skin Abscess: Care Instructions. \"  Current as of: February 5, 2016  Content Version: 11.1  © 7252-4625 [employer]. Care instructions adapted under license by [employer] (which disclaims liability or warranty for this information). If you have questions about a medical condition or this instruction, always ask your healthcare professional. [place] [address] any warranty or liability for your use of this information.

## 2017-02-08 ENCOUNTER — OFFICE VISIT (OUTPATIENT)
Dept: FAMILY MEDICINE CLINIC | Age: 52
End: 2017-02-08

## 2017-02-08 VITALS
DIASTOLIC BLOOD PRESSURE: 82 MMHG | OXYGEN SATURATION: 98 % | HEART RATE: 64 BPM | SYSTOLIC BLOOD PRESSURE: 126 MMHG | RESPIRATION RATE: 18 BRPM | HEIGHT: 74 IN | TEMPERATURE: 97 F | BODY MASS INDEX: 40.43 KG/M2 | WEIGHT: 315 LBS

## 2017-02-08 DIAGNOSIS — Z13.31 SCREENING FOR DEPRESSION: ICD-10-CM

## 2017-02-08 DIAGNOSIS — E66.01 MORBID OBESITY DUE TO EXCESS CALORIES (HCC): ICD-10-CM

## 2017-02-08 DIAGNOSIS — L02.415 CUTANEOUS ABSCESS OF RIGHT LOWER EXTREMITY: Primary | ICD-10-CM

## 2017-02-08 RX ORDER — TOPIRAMATE 25 MG/1
25 TABLET ORAL 2 TIMES DAILY WITH MEALS
Qty: 60 TAB | Refills: 2 | Status: SHIPPED | OUTPATIENT
Start: 2017-02-08 | End: 2017-03-30 | Stop reason: DRUGHIGH

## 2017-02-08 RX ORDER — PHENTERMINE HYDROCHLORIDE 15 MG/1
30 CAPSULE ORAL
Qty: 60 CAP | Refills: 2 | Status: SHIPPED | OUTPATIENT
Start: 2017-02-08 | End: 2017-03-30 | Stop reason: DRUGHIGH

## 2017-02-08 NOTE — MR AVS SNAPSHOT
Visit Information Date & Time Provider Department Dept. Phone Encounter #  
 2/8/2017  8:15 AM [address], [address] [phone] [id number] Upcoming Health Maintenance Date Due Pneumococcal 19-64 Medium Risk (1 of 1 - PPSV23) 10/9/1984 DTaP/Tdap/Td series (1 - Tdap) 10/9/1986 FOBT Q 1 YEAR AGE 50-75 10/9/2015 Allergies as of 2/8/2017  Review Complete On: 2/8/2017 By: Isabella Akins LPN Severity Noted Reaction Type Reactions Vicodin [Hydrocodone-acetaminophen] Medium 12/23/2015    Itching Current Immunizations  Never Reviewed No immunizations on file. Not reviewed this visit You Were Diagnosed With   
  
 Codes Comments Cutaneous abscess of right lower extremity    -  Primary ICD-10-CM: I63.519 ICD-9-CM: 682.6 Morbid obesity due to excess calories (HCC)     ICD-10-CM: E66.01 
ICD-9-CM: 278.01 Screening for depression     ICD-10-CM: Z13.89 ICD-9-CM: V79.0 Vitals BP Pulse Temp Resp Height(growth percentile) Weight(growth percentile) 126/82 (BP 1 Location: Left arm, BP Patient Position: Sitting) 64 97 °F (36.1 °C) (Oral) 18 6' 2\" (1.88 m) 346 lb 3.2 oz (157 kg) SpO2 BMI Smoking Status 98% 44.45 kg/m2 Current Every Day Smoker Vitals History BMI and BSA Data Body Mass Index Body Surface Area 44.45 kg/m 2 2.86 m 2 Preferred Pharmacy Pharmacy Name Phone [id number] [address] [phone] Your Updated Medication List  
  
   
This list is accurate as of: 2/8/17  9:35 AM.  Always use your most recent med list.  
  
  
  
  
 albuterol 90 mcg/actuation inhaler Commonly known as:  PROVENTIL HFA, VENTOLIN HFA, PROAIR HFA Take 2 Puffs by inhalation every six (6) hours as needed for Wheezing. Indications: CHRONIC OBSTRUCTIVE PULMONARY DISEASE budesonide-formoterol 80-4.5 mcg/actuation Hfaa inhaler Commonly known as:  SYMBICORT Take 2 Puffs by inhalation two (2) times a day. methocarbamol 750 mg tablet Commonly known as:  ROBAXIN Take 1 Tab by mouth four (4) times daily as needed. naproxen 500 mg tablet Commonly known as:  NAPROSYN Take 1 Tab by mouth two (2) times daily (with meals). omeprazole 40 mg capsule Commonly known as:  PRILOSEC Take 1 Cap by mouth daily. oxyCODONE-acetaminophen 5-325 mg per tablet Commonly known as:  PERCOCET Take 1 Tab by mouth two (2) times daily as needed for Pain. Max Daily Amount: 2 Tabs. phentermine 15 mg capsule Commonly known as:  ADIPEX_P Take 2 Caps by mouth every morning. Max Daily Amount: 30 mg.  
  
 rosuvastatin 20 mg tablet Commonly known as:  CRESTOR Take 1 Tab by mouth nightly. sildenafil 20 mg tablet Commonly known as:  REVATIO  
1-2 tabs 30 minutes before activity  
  
 sucralfate 100 mg/mL suspension Commonly known as:  Colton Aden Take 10 mL by mouth two (2) times a day. topiramate 25 mg tablet Commonly known as:  TOPAMAX Take 1 Tab by mouth two (2) times daily (with meals). triamcinolone acetonide 0.5 % ointment Commonly known as:  KENALOG Apply  to affected area two (2) times daily as needed for Skin Irritation. use thin layer  
  
 trimethoprim-sulfamethoxazole 160-800 mg per tablet Commonly known as:  BACTRIM DS, SEPTRA DS Take 1 Tab by mouth two (2) times a day for 7 days. varenicline 1 mg tablet Commonly known as:  Garlon Spotted Take 1 Tab by mouth two (2) times a day. Prescriptions Printed Refills  
 phentermine (ADIPEX_P) 15 mg capsule 2 Sig: Take 2 Caps by mouth every morning. Max Daily Amount: 30 mg.  
 Class: Print Route: Oral  
  
Prescriptions Sent to Pharmacy  Refills  
 topiramate (TOPAMAX) 25 mg tablet 2  
 Sig: Take 1 Tab by mouth two (2) times daily (with meals). Class: Normal  
 Pharmacy: [address] [place], [place] [address] [facility] #: [phone] Route: Oral  
  
We Performed the Following BEHAV ASSMT W/SCORE & DOCD/STAND INSTRUMENT [id number] CPT(R)] Patient Instructions Keep use heating pad and squeeze edges of abscess. Take out packing on Monday. Skin Abscess: Care Instructions Your Care Instructions A skin abscess is a bacterial infection that forms a pocket of pus. A boil is a kind of skin abscess. The doctor may have cut an opening in the abscess so that the pus can drain out. You may have gauze in the cut so that the abscess will stay open and keep draining. You may need antibiotics. You will need to follow up with your doctor to make sure the infection has gone away. The doctor has checked you carefully, but problems can develop later. If you notice any problems or new symptoms, get medical treatment right away. Follow-up care is a key part of your treatment and safety. Be sure to make and go to all appointments, and call your doctor if you are having problems. It's also a good idea to know your test results and keep a list of the medicines you take. How can you care for yourself at home? · Apply warm and dry compresses, a heating pad set on low, or a hot water bottle 3 or 4 times a day for pain. Keep a cloth between the heat source and your skin. · If your doctor prescribed antibiotics, take them as directed. Do not stop taking them just because you feel better. You need to take the full course of antibiotics. · Take pain medicines exactly as directed. ¨ If the doctor gave you a prescription medicine for pain, take it as prescribed. ¨ If you are not taking a prescription pain medicine, ask your doctor if you can take an over-the-counter medicine. · Keep your bandage clean and dry.  Change the bandage whenever it gets wet or dirty, or at least one time a day. · If the abscess was packed with gauze: 
¨ Keep follow-up appointments to have the gauze changed or removed. If the doctor instructed you to remove the gauze, gently pull out all of the gauze when your doctor tells you to. ¨ After the gauze is removed, soak the area in warm water for 15 to 20 minutes 2 times a day, until the wound closes. When should you call for help? Call your doctor now or seek immediate medical care if: 
· You have signs of worsening infection, such as: 
¨ Increased pain, swelling, warmth, or redness. ¨ Red streaks leading from the infected skin. ¨ Pus draining from the wound. ¨ A fever. Watch closely for changes in your health, and be sure to contact your doctor if: 
· You do not get better as expected. Where can you learn more? Go to http://nohelia-martina.info/. Enter J548 in the search box to learn more about \"Skin Abscess: Care Instructions. \" Current as of: February 5, 2016 Content Version: 11.1 © 6749-5711 [employer]. Care instructions adapted under license by [employer] (which disclaims liability or warranty for this information). If you have questions about a medical condition or this instruction, always ask your healthcare professional. [address] any warranty or liability for your use of this information. Introducing [facility]! Dear Jenny García: 
Thank you for requesting a [employer] account. Our records indicate that you already have an active [employer] account. You can access your account anytime at [email or web address] Did you know that you can access your hospital and ER discharge instructions at any time in [employer]? You can also review all of your test results from your hospital stay or ER visit. Additional Information If you have questions, please visit the Frequently Asked Questions section of the [employer] website at [email or web address] Remember, [employer] is NOT to be used for urgent needs. For medical emergencies, dial 911. Now available from your iPhone and Android! Please provide this summary of care documentation to your next provider. Your primary care clinician is listed as Ana Oshea. If you have any questions after today's visit, please call [phone].

## 2017-02-08 NOTE — PROGRESS NOTES
Skin Problem        HPI: Alejo Thomas is a 46 y.o. male WHITE [place]   Here for follow up of his abscess, which was I/D on Monday. He has been using his antibiotic without side effect. He denies any fevers. Pain has improved. The gauze covering has had some purulent discharge. He has undergone Lexiscan for evaluation of his elevated Coronary Calcium score. His lexiscan returned back normal myocardial perfusion, LVEF 60% and low risk for ischemia. He would like to restart his phentermne to aid in  Weight loss. Past Medical History   Diagnosis Date    Asthma     GERD (gastroesophageal reflux disease)     Hypercholesterolemia 2010    Hyperlipidemia     Morbid obesity (HCC)        Current Outpatient Prescriptions   Medication Sig    phentermine (ADIPEX_P) 15 mg capsule Take 2 Caps by mouth every morning. Max Daily Amount: 30 mg.  topiramate (TOPAMAX) 25 mg tablet Take 1 Tab by mouth two (2) times daily (with meals).  oxyCODONE-acetaminophen (PERCOCET) 5-325 mg per tablet Take 1 Tab by mouth two (2) times daily as needed for Pain. Max Daily Amount: 2 Tabs.  trimethoprim-sulfamethoxazole (BACTRIM DS, SEPTRA DS) 160-800 mg per tablet Take 1 Tab by mouth two (2) times a day for 7 days.  omeprazole (PRILOSEC) 40 mg capsule Take 1 Cap by mouth daily.  sucralfate (CARAFATE) 100 mg/mL suspension Take 10 mL by mouth two (2) times a day.  triamcinolone acetonide (KENALOG) 0.5 % ointment Apply  to affected area two (2) times daily as needed for Skin Irritation. use thin layer    rosuvastatin (CRESTOR) 20 mg tablet Take 1 Tab by mouth nightly.  naproxen (NAPROSYN) 500 mg tablet Take 1 Tab by mouth two (2) times daily (with meals).  methocarbamol (ROBAXIN) 750 mg tablet Take 1 Tab by mouth four (4) times daily as needed.  (Patient taking differently: Take 750 mg by mouth as needed.)    sildenafil (REVATIO) 20 mg tablet 1-2 tabs 30 minutes before activity    albuterol (PROVENTIL HFA, VENTOLIN HFA, PROAIR HFA) 90 mcg/actuation inhaler Take 2 Puffs by inhalation every six (6) hours as needed for Wheezing. Indications: CHRONIC OBSTRUCTIVE PULMONARY DISEASE    budesonide-formoterol (SYMBICORT) 80-4.5 mcg/actuation HFAA inhaler Take 2 Puffs by inhalation two (2) times a day.  varenicline (CHANTIX) 1 mg tablet Take 1 Tab by mouth two (2) times a day. No current facility-administered medications for this visit. Allergies   Allergen Reactions    Vicodin [Hydrocodone-Acetaminophen] Itching       Past Surgical History   Procedure Laterality Date    Hx orthopaedic Left 2003     left lateral meniscus amputated    Hx colonoscopy  12-     4 polyps removed       Family History   Problem Relation Age of Onset    Diabetes Mother     Stroke Mother     Arthritis-osteo Father     Hypertension Father     Arthritis-osteo Brother     Heart Attack Brother     Diabetes Maternal Grandmother        Social History     Social History    Marital status:      Spouse name: N/A    Number of children: N/A    Years of education: N/A     Occupational History    Not on file. Social History Main Topics    Smoking status: Current Every Day Smoker     Packs/day: 2.00     Years: 45.00     Types: Cigarettes     Start date: 1/1/1970     Last attempt to quit: 7/13/2016    Smokeless tobacco: Never Used    Alcohol use 0.0 oz/week     0 Standard drinks or equivalent per week      Comment: occasionally    Drug use: No    Sexual activity: Not on file     Other Topics Concern    Not on file     Social History Narrative       Review of Systems   Constitutional: Negative for chills and fever.          Visit Vitals    /82 (BP 1 Location: Left arm, BP Patient Position: Sitting)    Pulse 64    Temp 97 °F (36.1 °C) (Oral)    Resp 18    Ht 6' 2\" (1.88 m)    Wt 346 lb 3.2 oz (157 kg)    SpO2 98%    BMI 44.45 kg/m2       Physical Exam   Constitutional: He is oriented to person, place, and time. He appears well-developed and well-nourished. No distress. HENT:   Head: Normocephalic and atraumatic. Right Ear: External ear normal.   Left Ear: External ear normal.   Neurological: He is alert and oriented to person, place, and time. Skin: He is not diaphoretic. Right inner thigh abscess now measuring 4x4 cm, continued indurated tissue, packing removed covered with seropurulent material, wound now measures to depth of 2 cm. Wound repacked with 4 cm of iodoform gauze. Psychiatric: He has a normal mood and affect. Nursing note and vitals reviewed. Assesment:  1. Cutaneous abscess of right lower extremity   Packing removed and let him know that he should continue to squeeze the edges of the abscess and apply warm compress to the abscess. Finish out course of abx. Ok to remove packing on Monday or sooner if it falls out on its own    2. Morbid obesity due to excess calories (HCC)    - phentermine (ADIPEX_P) 15 mg capsule; Take 2 Caps by mouth every morning. Max Daily Amount: 30 mg. Dispense: 60 Cap; Refill: 2  - topiramate (TOPAMAX) 25 mg tablet; Take 1 Tab by mouth two (2) times daily (with meals). Dispense: 60 Tab; Refill: 2    3. Screening for depression    - BEHAV ASSMT W/SCORE & DOCD/STAND INSTRUMENT      I have discussed the diagnosis with the patient and the intended management  The patient has received an after-visit summary and questions were answered concerning future plans. I have discussed medication usage, side effects and warnings with the patient as well. I have reviewed the plan of care with the patient, accepted their input and they are in agreement with the treatment goals. Follow-up Disposition:  Return in about 6 weeks (around 3/22/2017) for weight.       Farrukh Ivory MD                .

## 2017-02-08 NOTE — PROGRESS NOTES
Shawn Hall is a 46 y.o. male here today for a follow up on his abscess. Would also like to discuss his cardio results. He would also like to discuss going back on his Adipex. He also wants to have a Nicotine test because he says he has gotten rid of the nicotine. Learning assessment previously completed. 1. Have you been to the ER, urgent care clinic or hospitalized since your last visit? no    2. Have you seen or consulted any other health care providers outside of the [address] [place] [address] since your last visit (Include any pap smears or colon screening)? no      Do you have an Advanced Directive? yes    Would you like information on Advanced Directives?  no

## 2017-02-22 ENCOUNTER — TELEPHONE (OUTPATIENT)
Dept: CARDIOLOGY CLINIC | Age: 52
End: 2017-02-22

## 2017-02-22 NOTE — TELEPHONE ENCOUNTER
----- Message from Altaf Gamble MD sent at 2/11/2017  1:26 PM EST -----  The patient's Cardiolite scan was negative for ischemia. No evidence for blocked coronary arteries.   Please let him know

## 2017-02-27 ENCOUNTER — TELEPHONE (OUTPATIENT)
Dept: FAMILY MEDICINE CLINIC | Age: 52
End: 2017-02-27

## 2017-02-27 DIAGNOSIS — R31.0 GROSS HEMATURIA: Primary | ICD-10-CM

## 2017-02-27 NOTE — TELEPHONE ENCOUNTER
Patient's wife called and states that patient had blood in his urine on Friday. Only complaint was lower abdominal pain. He denied any lower back pain, pain or urgency with urination, or strong smell. He stated that it cleared up Sunday morning. He would like a referral to Urology.

## 2017-03-30 ENCOUNTER — HOSPITAL ENCOUNTER (OUTPATIENT)
Dept: LAB | Age: 52
Discharge: HOME | End: 2017-03-30
Payer: COMMERCIAL

## 2017-03-30 ENCOUNTER — OFFICE VISIT (OUTPATIENT)
Dept: FAMILY MEDICINE CLINIC | Age: 52
End: 2017-03-30

## 2017-03-30 VITALS
RESPIRATION RATE: 18 BRPM | BODY MASS INDEX: 40.43 KG/M2 | HEART RATE: 70 BPM | DIASTOLIC BLOOD PRESSURE: 70 MMHG | OXYGEN SATURATION: 94 % | SYSTOLIC BLOOD PRESSURE: 119 MMHG | WEIGHT: 315 LBS | HEIGHT: 74 IN | TEMPERATURE: 96.4 F

## 2017-03-30 DIAGNOSIS — J41.0 SIMPLE CHRONIC BRONCHITIS (HCC): ICD-10-CM

## 2017-03-30 DIAGNOSIS — M25.511 CHRONIC RIGHT SHOULDER PAIN: ICD-10-CM

## 2017-03-30 DIAGNOSIS — G89.29 CHRONIC RIGHT SHOULDER PAIN: ICD-10-CM

## 2017-03-30 DIAGNOSIS — E66.01 MORBID OBESITY DUE TO EXCESS CALORIES (HCC): Primary | ICD-10-CM

## 2017-03-30 DIAGNOSIS — E78.5 DYSLIPIDEMIA: ICD-10-CM

## 2017-03-30 LAB
ALBUMIN SERPL BCP-MCNC: 4.1 G/DL (ref 3.4–5)
ALBUMIN/GLOB SERPL: 1.2 {RATIO} (ref 0.8–1.7)
ALP SERPL-CCNC: 95 U/L (ref 45–117)
ALT SERPL-CCNC: 41 U/L (ref 16–61)
ANION GAP BLD CALC-SCNC: 9 MMOL/L (ref 3–18)
AST SERPL W P-5'-P-CCNC: 22 U/L (ref 15–37)
BILIRUB SERPL-MCNC: 0.6 MG/DL (ref 0.2–1)
BUN SERPL-MCNC: 17 MG/DL (ref 7–18)
BUN/CREAT SERPL: 17 (ref 12–20)
CALCIUM SERPL-MCNC: 8.7 MG/DL (ref 8.5–10.1)
CHLORIDE SERPL-SCNC: 107 MMOL/L (ref 100–108)
CHOLEST SERPL-MCNC: 164 MG/DL
CO2 SERPL-SCNC: 22 MMOL/L (ref 21–32)
CREAT SERPL-MCNC: 1.03 MG/DL (ref 0.6–1.3)
GLOBULIN SER CALC-MCNC: 3.5 G/DL (ref 2–4)
GLUCOSE SERPL-MCNC: 99 MG/DL (ref 74–99)
HDLC SERPL-MCNC: 31 MG/DL (ref 40–60)
HDLC SERPL: 5.3 {RATIO} (ref 0–5)
LDLC SERPL CALC-MCNC: 95.8 MG/DL (ref 0–100)
LIPID PROFILE,FLP: ABNORMAL
POTASSIUM SERPL-SCNC: 4.5 MMOL/L (ref 3.5–5.5)
PROT SERPL-MCNC: 7.6 G/DL (ref 6.4–8.2)
SODIUM SERPL-SCNC: 138 MMOL/L (ref 136–145)
TRIGL SERPL-MCNC: 186 MG/DL (ref ?–150)
VLDLC SERPL CALC-MCNC: 37.2 MG/DL

## 2017-03-30 PROCEDURE — 80053 COMPREHEN METABOLIC PANEL: CPT | Performed by: FAMILY MEDICINE

## 2017-03-30 PROCEDURE — 80061 LIPID PANEL: CPT | Performed by: FAMILY MEDICINE

## 2017-03-30 RX ORDER — TOPIRAMATE 50 MG/1
50 TABLET, FILM COATED ORAL 2 TIMES DAILY
Qty: 180 TAB | Refills: 1 | Status: SHIPPED | OUTPATIENT
Start: 2017-03-30 | End: 2017-03-30 | Stop reason: SDUPTHER

## 2017-03-30 RX ORDER — TOPIRAMATE 50 MG/1
50 TABLET, FILM COATED ORAL 2 TIMES DAILY
Qty: 180 TAB | Refills: 1 | Status: SHIPPED | OUTPATIENT
Start: 2017-03-30 | End: 2017-04-07 | Stop reason: SDUPTHER

## 2017-03-30 RX ORDER — PHENTERMINE HYDROCHLORIDE 15 MG/1
CAPSULE ORAL
Qty: 90 CAP | Refills: 2 | Status: SHIPPED | OUTPATIENT
Start: 2017-03-30 | End: 2017-05-22 | Stop reason: SDUPTHER
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
## 2017-03-30 NOTE — MR AVS SNAPSHOT
Visit Information Date & Time Provider Department Dept. Phone Encounter #  
 3/30/2017  8:15 AM [address], [employer] [phone] [id number] Follow-up Instructions Return in about 6 weeks (around 5/11/2017) for weight.  
  
 5/24/2017  2:30 PM  
Any with Radha De La Rosa MD  
[facility]) Appt Note: 4-6 week post op from ? TURBT  
 [address] [address]  
[phone]  
  
   
 [place] [id number] Upcoming Health Maintenance Date Due Pneumococcal 19-64 Medium Risk (1 of 1 - PPSV23) 10/9/1984 DTaP/Tdap/Td series (1 - Tdap) 10/9/1986 FOBT Q 1 YEAR AGE 50-75 10/9/2015 Allergies as of 3/30/2017  Review Complete On: 3/30/2017 By: Geoff Romero LPN Severity Noted Reaction Type Reactions Vicodin [Hydrocodone-acetaminophen] Medium 12/23/2015    Itching Current Immunizations  Never Reviewed No immunizations on file. Not reviewed this visit You Were Diagnosed With   
  
 Codes Comments Morbid obesity due to excess calories ([facility] [address]    -  Primary ICD-10-CM: E66.01 
ICD-9-CM: 278.01 Chronic right shoulder pain     ICD-10-CM: M25.511, G89.29 ICD-9-CM: 719.41, 338.29 Simple chronic bronchitis (HCC)     ICD-10-CM: J41.0 ICD-9-CM: 491.0 Dyslipidemia     ICD-10-CM: E78.5 ICD-9-CM: 272.4 Vitals BP Pulse Temp Resp Height(growth percentile) Weight(growth percentile) 150/75 (BP 1 Location: Left arm, BP Patient Position: Sitting) 71 96.4 °F (35.8 °C) (Oral) 18 6' 2\" (1.88 m) 347 lb (157.4 kg) SpO2 BMI Smoking Status 94% 44.55 kg/m2 Current Every Day Smoker Vitals History BMI and BSA Data Body Mass Index Body Surface Area 44.55 kg/m 2 2.87 m 2 Preferred Pharmacy Pharmacy Name Phone  [id number] [id number] [facility] [phone] Your Updated Medication List  
  
   
This list is accurate as of: 3/30/17  9:18 AM.  Always use your most recent med list.  
  
  
  
  
 albuterol 90 mcg/actuation inhaler Commonly known as:  PROVENTIL HFA, VENTOLIN HFA, PROAIR HFA Take 2 Puffs by inhalation every six (6) hours as needed for Wheezing. Indications: CHRONIC OBSTRUCTIVE PULMONARY DISEASE  
  
 budesonide-formoterol 80-4.5 mcg/actuation Hfaa inhaler Commonly known as:  SYMBICORT Take 2 Puffs by inhalation two (2) times a day. diazePAM 10 mg tablet Commonly known as:  VALIUM Take one hour prior to procedure  
  
 methocarbamol 750 mg tablet Commonly known as:  ROBAXIN Take 1 Tab by mouth four (4) times daily as needed. naproxen 500 mg tablet Commonly known as:  NAPROSYN Take 1 Tab by mouth two (2) times daily (with meals). omeprazole 40 mg capsule Commonly known as:  PRILOSEC Take 1 Cap by mouth daily. omeprazole-sodium bicarbonate 40-1.1 mg-gram capsule Commonly known as:  ZEGERID  
TAKE 1 CAPSULE BY MOUTH DAILY  
  
 oxyCODONE-acetaminophen 5-325 mg per tablet Commonly known as:  PERCOCET Take 1 Tab by mouth two (2) times daily as needed for Pain. Max Daily Amount: 2 Tabs. phentermine 15 mg capsule Commonly known as:  ADIPEX_P  
2 tabs in the morning and 1 tab in early afternoon  
  
 rosuvastatin 20 mg tablet Commonly known as:  CRESTOR Take 1 Tab by mouth nightly. sildenafil 20 mg tablet Commonly known as:  REVATIO  
1-2 tabs 30 minutes before activity  
  
 sucralfate 100 mg/mL suspension Commonly known as:  Brian Garbe Take 10 mL by mouth two (2) times a day. topiramate 50 mg tablet Commonly known as:  TOPAMAX Take 1 Tab by mouth two (2) times a day. triamcinolone acetonide 0.5 % ointment Commonly known as:  KENALOG Apply  to affected area two (2) times daily as needed for Skin Irritation. use thin layer Prescriptions Printed Refills  
 phentermine (ADIPEX_P) 15 mg capsule 2 Si tabs in the morning and 1 tab in early afternoon Class: Print  
 topiramate (TOPAMAX) 50 mg tablet 1 Sig: Take 1 Tab by mouth two (2) times a day. Class: Print Route: Oral  
  
Follow-up Instructions Return in about 6 weeks (around 2017) for weight. To-Do List   
 2017 Lab:  LIPID PANEL   
  
 2017 Lab:  METABOLIC PANEL, COMPREHENSIVE [facility] SERVICES! Dear Dimitrios Reardon: 
Thank you for requesting a [employer] account. Our records indicate that you already have an active [employer] account. You can access your account anytime at [email or web address] Did you know that you can access your hospital and ER discharge instructions at any time in [employer]? You can also review all of your test results from your hospital stay or ER visit. Additional Information If you have questions, please visit the Frequently Asked Questions section of the [employer] website at [email or web address] Remember, [employer] is NOT to be used for urgent needs. For medical emergencies, dial 911. Now available from your iPhone and Android! Please provide this summary of care documentation to your next provider. Your primary care clinician is listed as [address] [place] [address]. If you have any questions after today's visit, please call [phone].

## 2017-03-30 NOTE — PROGRESS NOTES
Shawn Hall is a 46 y.o. male here this morning for a follow up on his weight loss and smoking. He went to his Urology appointment and has a follow up procedure to check for bladder cancer. Learning assessment previously completed. 1. Have you been to the ER, urgent care clinic or hospitalized since your last visit? no    2. Have you seen or consulted any other health care providers outside of the [facility] since your last visit (Include any pap smears or colon screening)? Urology      Do you have an Advanced Directive? yes    Would you like information on Advanced Directives?  no

## 2017-03-30 NOTE — PATIENT INSTRUCTIONS
Body Mass Index: Care Instructions  Your Care Instructions    Body mass index (BMI) can help you see if your weight is raising your risk for health problems. It uses a formula to compare how much you weigh with how tall you are. · A BMI lower than 18.5 is considered underweight. · A BMI between 18.5 and 24.9 is considered healthy. · A BMI between 25 and 29.9 is considered overweight. A BMI of 30 or higher is considered obese. If your BMI is in the normal range, it means that you have a lower risk for weight-related health problems. If your BMI is in the overweight or obese range, you may be at increased risk for weight-related health problems, such as high blood pressure, heart disease, stroke, arthritis or joint pain, and diabetes. If your BMI is in the underweight range, you may be at increased risk for health problems such as fatigue, lower protection (immunity) against illness, muscle loss, bone loss, hair loss, and hormone problems. BMI is just one measure of your risk for weight-related health problems. You may be at higher risk for health problems if you are not active, you eat an unhealthy diet, or you drink too much alcohol or use tobacco products. Follow-up care is a key part of your treatment and safety. Be sure to make and go to all appointments, and call your doctor if you are having problems. It's also a good idea to know your test results and keep a list of the medicines you take. How can you care for yourself at home? · Practice healthy eating habits. This includes eating plenty of fruits, vegetables, whole grains, lean protein, and low-fat dairy. · If your doctor recommends it, get more exercise. Walking is a good choice. Bit by bit, increase the amount you walk every day. Try for at least 30 minutes on most days of the week. · Do not smoke. Smoking can increase your risk for health problems. If you need help quitting, talk to your doctor about stop-smoking programs and medicines. These can increase your chances of quitting for good. · Limit alcohol to 2 drinks a day for men and 1 drink a day for women. Too much alcohol can cause health problems. If you have a BMI higher than 25  · Your doctor may do other tests to check your risk for weight-related health problems. This may include measuring the distance around your waist. A waist measurement of more than 40 inches in men or 35 inches in women can increase the risk of weight-related health problems. · Talk with your doctor about steps you can take to stay healthy or improve your health. You may need to make lifestyle changes to lose weight and stay healthy, such as changing your diet and getting regular exercise. If you have a BMI lower than 18.5  · Your doctor may do other tests to check your risk for health problems. · Talk with your doctor about steps you can take to stay healthy or improve your health. You may need to make lifestyle changes to gain or maintain weight and stay healthy, such as getting more healthy foods in your diet and doing exercises to build muscle. Where can you learn more? Go to http://nohelia-martina.info/. Enter S176 in the search box to learn more about \"Body Mass Index: Care Instructions. \"  Current as of: January 23, 2017  Content Version: 11.2  © 8048-1330 [employer]. Care instructions adapted under license by [employer] (which disclaims liability or warranty for this information). If you have questions about a medical condition or this instruction, always ask your healthcare professional. [place] [address] any warranty or liability for your use of this information.

## 2017-04-04 DIAGNOSIS — K21.9 GASTROESOPHAGEAL REFLUX DISEASE WITHOUT ESOPHAGITIS: ICD-10-CM

## 2017-04-05 DIAGNOSIS — E66.01 MORBID OBESITY DUE TO EXCESS CALORIES (HCC): ICD-10-CM

## 2017-04-05 DIAGNOSIS — K21.9 GASTROESOPHAGEAL REFLUX DISEASE WITHOUT ESOPHAGITIS: ICD-10-CM

## 2017-04-05 RX ORDER — OMEPRAZOLE 40 MG/1
CAPSULE, DELAYED RELEASE ORAL
Qty: 30 CAP | Refills: 1 | OUTPATIENT
Start: 2017-04-05

## 2017-04-05 RX ORDER — ROSUVASTATIN CALCIUM 20 MG/1
20 TABLET, COATED ORAL
Qty: 30 TAB | Refills: 2 | Status: SHIPPED | OUTPATIENT
Start: 2017-04-05 | End: 2017-04-10 | Stop reason: SDUPTHER

## 2017-04-05 RX ORDER — OMEPRAZOLE 40 MG/1
40 CAPSULE, DELAYED RELEASE ORAL DAILY
Qty: 30 CAP | Refills: 1 | Status: SHIPPED | OUTPATIENT
Start: 2017-04-05 | End: 2017-05-08 | Stop reason: SDUPTHER

## 2017-04-06 DIAGNOSIS — G89.29 CHRONIC RIGHT SHOULDER PAIN: Primary | ICD-10-CM

## 2017-04-06 DIAGNOSIS — M25.511 CHRONIC RIGHT SHOULDER PAIN: Primary | ICD-10-CM

## 2017-04-07 ENCOUNTER — DOCUMENTATION ONLY (OUTPATIENT)
Dept: FAMILY MEDICINE CLINIC | Age: 52
End: 2017-04-07

## 2017-04-07 ENCOUNTER — TELEPHONE (OUTPATIENT)
Dept: FAMILY MEDICINE CLINIC | Age: 52
End: 2017-04-07

## 2017-04-07 NOTE — TELEPHONE ENCOUNTER
Robert Wolff, Pharmacist from [facility] called asking if it was OK to fill patients Topamax. I placed her on hold and called the patient to see if it was ok. Mr. Garcia Nigel stated it was ok with him for [facility] to fill the RX. [facility] was made aware and RX was called in and verified.

## 2017-04-07 NOTE — PROGRESS NOTES
Called patient, left voicemail to make patient aware that [facility] does not perform the type of US ordered for him.

## 2017-04-10 RX ORDER — ROSUVASTATIN CALCIUM 20 MG/1
20 TABLET, COATED ORAL
Qty: 90 TAB | Refills: 4 | Status: SHIPPED | OUTPATIENT
Start: 2017-04-10 | End: 2018-01-03 | Stop reason: SDUPTHER

## 2017-04-10 RX ORDER — TOPIRAMATE 50 MG/1
50 TABLET, FILM COATED ORAL 2 TIMES DAILY
Qty: 180 TAB | Refills: 1 | Status: SHIPPED | OUTPATIENT
Start: 2017-04-10 | End: 2017-09-16 | Stop reason: SDUPTHER

## 2017-04-12 ENCOUNTER — TELEPHONE (OUTPATIENT)
Dept: FAMILY MEDICINE CLINIC | Age: 52
End: 2017-04-12

## 2017-04-12 NOTE — TELEPHONE ENCOUNTER
Wife called asking if Topomax or Phentermine could cause blood in patients urine. He is scheduled for a Cystoscopy on April 27, 2017 and would like to cancel if it could be a side effect from the medication. After speaking to Dr. Pandora Schirmer, I called patient back to let her know he should keep his appointment and that the blood is more than likely not coming from the medication. She also wanted to know if patient could start getting a testosterone injection but Dr. Pandora Schirmer said patient would need to come in for an office visit to discuss that. She is aware of that message as well and said they would be in to discuss that at a later time.

## 2017-04-13 ENCOUNTER — TELEPHONE (OUTPATIENT)
Dept: FAMILY MEDICINE CLINIC | Age: 52
End: 2017-04-13

## 2017-04-13 NOTE — TELEPHONE ENCOUNTER
Spoke with patients wife. I told her we found 2 facilities that can do the ultrasound of the shoulder to evaluate for rotator cuff tear. She said [facility] would be fine. Please generate an order for this.

## 2017-04-14 DIAGNOSIS — G89.29 CHRONIC RIGHT SHOULDER PAIN: Primary | ICD-10-CM

## 2017-04-14 DIAGNOSIS — M25.511 CHRONIC RIGHT SHOULDER PAIN: Primary | ICD-10-CM

## 2017-04-19 ENCOUNTER — TELEPHONE (OUTPATIENT)
Dept: FAMILY MEDICINE CLINIC | Age: 52
End: 2017-04-19

## 2017-04-24 ENCOUNTER — TELEPHONE (OUTPATIENT)
Dept: FAMILY MEDICINE CLINIC | Age: 52
End: 2017-04-24

## 2017-04-24 NOTE — TELEPHONE ENCOUNTER
Call from [facility] requesting pts last EKG and chest x-ray, faxed to [phone]

## 2017-05-08 DIAGNOSIS — K21.9 GASTROESOPHAGEAL REFLUX DISEASE WITHOUT ESOPHAGITIS: ICD-10-CM

## 2017-05-08 RX ORDER — OMEPRAZOLE 40 MG/1
40 CAPSULE, DELAYED RELEASE ORAL DAILY
Qty: 30 CAP | Refills: 5 | Status: SHIPPED | OUTPATIENT
Start: 2017-05-08 | End: 2017-05-09 | Stop reason: SDUPTHER

## 2017-05-09 ENCOUNTER — DOCUMENTATION ONLY (OUTPATIENT)
Dept: FAMILY MEDICINE CLINIC | Age: 52
End: 2017-05-09

## 2017-05-09 DIAGNOSIS — K21.9 GASTROESOPHAGEAL REFLUX DISEASE WITHOUT ESOPHAGITIS: ICD-10-CM

## 2017-05-09 RX ORDER — OMEPRAZOLE 40 MG/1
40 CAPSULE, DELAYED RELEASE ORAL DAILY
Qty: 90 CAP | Refills: 2 | Status: SHIPPED | OUTPATIENT
Start: 2017-05-09 | End: 2018-01-23 | Stop reason: SDUPTHER

## 2017-05-11 ENCOUNTER — OFFICE VISIT (OUTPATIENT)
Dept: FAMILY MEDICINE CLINIC | Age: 52
End: 2017-05-11

## 2017-05-11 VITALS
HEART RATE: 69 BPM | BODY MASS INDEX: 40.43 KG/M2 | OXYGEN SATURATION: 97 % | HEIGHT: 74 IN | DIASTOLIC BLOOD PRESSURE: 78 MMHG | WEIGHT: 315 LBS | RESPIRATION RATE: 18 BRPM | TEMPERATURE: 96.4 F | SYSTOLIC BLOOD PRESSURE: 123 MMHG

## 2017-05-11 DIAGNOSIS — G89.29 CHRONIC RIGHT SHOULDER PAIN: ICD-10-CM

## 2017-05-11 DIAGNOSIS — E66.01 MORBID OBESITY DUE TO EXCESS CALORIES (HCC): Primary | ICD-10-CM

## 2017-05-11 DIAGNOSIS — R30.0 DYSURIA: ICD-10-CM

## 2017-05-11 DIAGNOSIS — M25.511 CHRONIC RIGHT SHOULDER PAIN: ICD-10-CM

## 2017-05-11 LAB
BILIRUB UR QL STRIP: NEGATIVE
GLUCOSE UR-MCNC: NEGATIVE MG/DL
KETONES P FAST UR STRIP-MCNC: NEGATIVE MG/DL
PH UR STRIP: 5.5 [PH] (ref 4.6–8)
PROT UR QL STRIP: NORMAL MG/DL
SP GR UR STRIP: 1.02 (ref 1–1.03)
UA UROBILINOGEN AMB POC: NORMAL (ref 0.2–1)
URINALYSIS CLARITY POC: NORMAL
URINALYSIS COLOR POC: NORMAL
URINE BLOOD POC: NORMAL
URINE LEUKOCYTES POC: NORMAL
URINE NITRITES POC: NEGATIVE

## 2017-05-11 RX ORDER — OXYCODONE AND ACETAMINOPHEN 5; 325 MG/1; MG/1
1 TABLET ORAL
Qty: 30 TAB | Refills: 0 | Status: SHIPPED | OUTPATIENT
Start: 2017-05-11 | End: 2017-05-24

## 2017-05-11 RX ORDER — OXYCODONE AND ACETAMINOPHEN 5; 325 MG/1; MG/1
1 TABLET ORAL
Qty: 14 TAB | Refills: 0 | Status: SHIPPED | OUTPATIENT
Start: 2017-05-11 | End: 2017-05-11 | Stop reason: SDUPTHER

## 2017-05-11 NOTE — MR AVS SNAPSHOT
Visit Information Date & Time Provider Department Dept. Phone Encounter #  
 5/11/2017  8:15 AM Hayley Velasquez, [address] [phone] [id number] Follow-up Instructions Return in about 3 months (around 8/11/2017) for weight.  
  
 5/24/2017  2:30 PM  
Any with Duke Garcias MD  
[facility]. [address]) Appt Note: 4-6 week post op from ? TURBT  
 [address] [place] [address]  
[phone]  
  
   
 [facility] [address] [id number] Upcoming Health Maintenance Date Due DTaP/Tdap/Td series (1 - Tdap) 10/9/1986 FOBT Q 1 YEAR AGE 50-75 10/9/2015 INFLUENZA AGE 9 TO ADULT 8/1/2017 Allergies as of 5/11/2017  Review Complete On: 5/11/2017 By: Evangelina Soares, SALVADOR, RT, NM, ARRT Severity Noted Reaction Type Reactions Vicodin [Hydrocodone-acetaminophen] Medium 12/23/2015    Itching Current Immunizations  Never Reviewed No immunizations on file. Not reviewed this visit You Were Diagnosed With   
  
 Codes Comments Morbid obesity due to excess calories ([facility] [address]    -  Primary ICD-10-CM: E66.01 
ICD-9-CM: 278.01 Dysuria     ICD-10-CM: R30.0 ICD-9-CM: 536. 1 Chronic right shoulder pain     ICD-10-CM: M25.511, G89.29 ICD-9-CM: 719.41, 338.29 Vitals BP Pulse Temp Resp Height(growth percentile) Weight(growth percentile) 123/78 (BP 1 Location: Right arm, BP Patient Position: Sitting) 69 96.4 °F (35.8 °C) (Oral) 18 6' 2\" (1.88 m) 340 lb (154.2 kg) SpO2 BMI Smoking Status 97% 43.65 kg/m2 Current Every Day Smoker Vitals History BMI and BSA Data Body Mass Index Body Surface Area  
 43.65 kg/m 2 2.84 m 2 Preferred Pharmacy Pharmacy Name Phone [address] [facility] [phone] Your Updated Medication List  
  
   
 This list is accurate as of: 5/11/17  9:20 AM.  Always use your most recent med list.  
  
  
  
  
 albuterol 90 mcg/actuation inhaler Commonly known as:  PROVENTIL HFA, VENTOLIN HFA, PROAIR HFA Take 2 Puffs by inhalation every six (6) hours as needed for Wheezing. Indications: CHRONIC OBSTRUCTIVE PULMONARY DISEASE  
  
 budesonide-formoterol 80-4.5 mcg/actuation Hfaa inhaler Commonly known as:  SYMBICORT Take 2 Puffs by inhalation two (2) times a day. diazePAM 10 mg tablet Commonly known as:  VALIUM Take one hour prior to procedure  
  
 methocarbamol 750 mg tablet Commonly known as:  ROBAXIN Take 1 Tab by mouth four (4) times daily as needed. naproxen 500 mg tablet Commonly known as:  NAPROSYN Take 1 Tab by mouth two (2) times daily (with meals). omeprazole 40 mg capsule Commonly known as:  PRILOSEC Take 1 Cap by mouth daily. oxyCODONE-acetaminophen 5-325 mg per tablet Commonly known as:  PERCOCET Take 1 Tab by mouth two (2) times daily as needed for Pain. Max Daily Amount: 2 Tabs. phentermine 15 mg capsule Commonly known as:  ADIPEX_P  
2 tabs in the morning and 1 tab in early afternoon  
  
 rosuvastatin 20 mg tablet Commonly known as:  CRESTOR Take 1 Tab by mouth nightly. sildenafil 20 mg tablet Commonly known as:  REVATIO  
1-2 tabs 30 minutes before activity  
  
 topiramate 50 mg tablet Commonly known as:  TOPAMAX Take 1 Tab by mouth two (2) times a day. triamcinolone acetonide 0.5 % ointment Commonly known as:  KENALOG Apply  to affected area two (2) times daily as needed for Skin Irritation. use thin layer Prescriptions Printed Refills  
 oxyCODONE-acetaminophen (PERCOCET) 5-325 mg per tablet 0 Sig: Take 1 Tab by mouth two (2) times daily as needed for Pain. Max Daily Amount: 2 Tabs. Class: Print Route: Oral  
  
We Performed the Following AMB POC URINALYSIS DIP STICK AUTO W/ MICRO [53939 CPT(R)] Follow-up Instructions Return in about 3 months (around 8/11/2017) for weight. Patient Instructions Body Mass Index: Care Instructions Your Care Instructions Body mass index (BMI) can help you see if your weight is raising your risk for health problems. It uses a formula to compare how much you weigh with how tall you are. · A BMI lower than 18.5 is considered underweight. · A BMI between 18.5 and 24.9 is considered healthy. · A BMI between 25 and 29.9 is considered overweight. A BMI of 30 or higher is considered obese. If your BMI is in the normal range, it means that you have a lower risk for weight-related health problems. If your BMI is in the overweight or obese range, you may be at increased risk for weight-related health problems, such as high blood pressure, heart disease, stroke, arthritis or joint pain, and diabetes. If your BMI is in the underweight range, you may be at increased risk for health problems such as fatigue, lower protection (immunity) against illness, muscle loss, bone loss, hair loss, and hormone problems. BMI is just one measure of your risk for weight-related health problems. You may be at higher risk for health problems if you are not active, you eat an unhealthy diet, or you drink too much alcohol or use tobacco products. Follow-up care is a key part of your treatment and safety. Be sure to make and go to all appointments, and call your doctor if you are having problems. It's also a good idea to know your test results and keep a list of the medicines you take. How can you care for yourself at home? · Practice healthy eating habits. This includes eating plenty of fruits, vegetables, whole grains, lean protein, and low-fat dairy. · If your doctor recommends it, get more exercise. Walking is a good choice. Bit by bit, increase the amount you walk every day.  Try for at least 30 minutes on most days of the week. · Do not smoke. Smoking can increase your risk for health problems. If you need help quitting, talk to your doctor about stop-smoking programs and medicines. These can increase your chances of quitting for good. · Limit alcohol to 2 drinks a day for men and 1 drink a day for women. Too much alcohol can cause health problems. If you have a BMI higher than 25 · Your doctor may do other tests to check your risk for weight-related health problems. This may include measuring the distance around your waist. A waist measurement of more than 40 inches in men or 35 inches in women can increase the risk of weight-related health problems. · Talk with your doctor about steps you can take to stay healthy or improve your health. You may need to make lifestyle changes to lose weight and stay healthy, such as changing your diet and getting regular exercise. If you have a BMI lower than 18.5 · Your doctor may do other tests to check your risk for health problems. · Talk with your doctor about steps you can take to stay healthy or improve your health. You may need to make lifestyle changes to gain or maintain weight and stay healthy, such as getting more healthy foods in your diet and doing exercises to build muscle. Where can you learn more? Go to http://nohelia-martina.info/. Enter S176 in the search box to learn more about \"Body Mass Index: Care Instructions. \" Current as of: January 23, 2017 Content Version: 11.2 © 3781-0870 [employer]. Care instructions adapted under license by [employer] (which disclaims liability or warranty for this information). If you have questions about a medical condition or this instruction, always ask your healthcare professional. [place] [address] any warranty or liability for your use of this information. Introducing \[facility]\"" & HEALTH SERVICES! Dear Tereza Alaniz: Thank you for requesting a [employer] account. Our records indicate that you already have an active [employer] account. You can access your account anytime at [email or web address]. Taigen/[employer] Did you know that you can access your hospital and ER discharge instructions at any time in [employer]? You can also review all of your test results from your hospital stay or ER visit. Additional Information If you have questions, please visit the Frequently Asked Questions section of the [employer] website at [email or web address]. [employer]/. Remember, [employer] is NOT to be used for urgent needs. For medical emergencies, dial 911. Now available from your iPhone and Android! Please provide this summary of care documentation to your next provider. Your primary care clinician is listed as Heather Lemus. If you have any questions after today's visit, please call [phone].

## 2017-05-11 NOTE — PATIENT INSTRUCTIONS
Body Mass Index: Care Instructions  Your Care Instructions    Body mass index (BMI) can help you see if your weight is raising your risk for health problems. It uses a formula to compare how much you weigh with how tall you are. · A BMI lower than 18.5 is considered underweight. · A BMI between 18.5 and 24.9 is considered healthy. · A BMI between 25 and 29.9 is considered overweight. A BMI of 30 or higher is considered obese. If your BMI is in the normal range, it means that you have a lower risk for weight-related health problems. If your BMI is in the overweight or obese range, you may be at increased risk for weight-related health problems, such as high blood pressure, heart disease, stroke, arthritis or joint pain, and diabetes. If your BMI is in the underweight range, you may be at increased risk for health problems such as fatigue, lower protection (immunity) against illness, muscle loss, bone loss, hair loss, and hormone problems. BMI is just one measure of your risk for weight-related health problems. You may be at higher risk for health problems if you are not active, you eat an unhealthy diet, or you drink too much alcohol or use tobacco products. Follow-up care is a key part of your treatment and safety. Be sure to make and go to all appointments, and call your doctor if you are having problems. It's also a good idea to know your test results and keep a list of the medicines you take. How can you care for yourself at home? · Practice healthy eating habits. This includes eating plenty of fruits, vegetables, whole grains, lean protein, and low-fat dairy. · If your doctor recommends it, get more exercise. Walking is a good choice. Bit by bit, increase the amount you walk every day. Try for at least 30 minutes on most days of the week. · Do not smoke. Smoking can increase your risk for health problems. If you need help quitting, talk to your doctor about stop-smoking programs and medicines. These can increase your chances of quitting for good. · Limit alcohol to 2 drinks a day for men and 1 drink a day for women. Too much alcohol can cause health problems. If you have a BMI higher than 25  · Your doctor may do other tests to check your risk for weight-related health problems. This may include measuring the distance around your waist. A waist measurement of more than 40 inches in men or 35 inches in women can increase the risk of weight-related health problems. · Talk with your doctor about steps you can take to stay healthy or improve your health. You may need to make lifestyle changes to lose weight and stay healthy, such as changing your diet and getting regular exercise. If you have a BMI lower than 18.5  · Your doctor may do other tests to check your risk for health problems. · Talk with your doctor about steps you can take to stay healthy or improve your health. You may need to make lifestyle changes to gain or maintain weight and stay healthy, such as getting more healthy foods in your diet and doing exercises to build muscle. Where can you learn more? Go to http://nohelia-martina.info/. Enter S176 in the search box to learn more about \"Body Mass Index: Care Instructions. \"  Current as of: January 23, 2017  Content Version: 11.2  © 7390-7215 [employer]. Care instructions adapted under license by [employer] (which disclaims liability or warranty for this information). If you have questions about a medical condition or this instruction, always ask your healthcare professional. [place] [address] any warranty or liability for your use of this information.

## 2017-05-11 NOTE — PROGRESS NOTES
Karol Garrett is a 46 y.o. male here for follow up and urinary pain after a urinary procedure. Still has shoulder pain but has not had US as of yet.

## 2017-05-11 NOTE — PROGRESS NOTES
Urinary Pain        HPI: Franklyn Benitez is a 46 y.o. male [facility] here in follow up of his weight. He has been using his topamax and phentermine as prescribed. He denies any adverse effect. He has lost 7# since last visit 6 weeks ago. He reports his appetite is suppressed and he eats small meals. Continues to have pain in the right shoulder. He has been seen by ortho for this and was diagnosed with impingement syndrome. Recommended to have shoulder steroid injection he declines this. We have ordered ultrasound to evaluate for RC tear. He has yet to do this due to scheduling error on part of facility. Pain is severe at times not managed by OTCs. He has used Percocet in the past.              Past Medical History:   Diagnosis Date    Asthma     GERD (gastroesophageal reflux disease)     Hypercholesterolemia 2010    Hyperlipidemia     Morbid obesity (HCC)        Current Outpatient Prescriptions   Medication Sig    omeprazole (PRILOSEC) 40 mg capsule Take 1 Cap by mouth daily.  topiramate (TOPAMAX) 50 mg tablet Take 1 Tab by mouth two (2) times a day.  rosuvastatin (CRESTOR) 20 mg tablet Take 1 Tab by mouth nightly.  phentermine (ADIPEX_P) 15 mg capsule 2 tabs in the morning and 1 tab in early afternoon    oxyCODONE-acetaminophen (PERCOCET) 5-325 mg per tablet Take 1 Tab by mouth two (2) times daily as needed for Pain. Max Daily Amount: 2 Tabs.  triamcinolone acetonide (KENALOG) 0.5 % ointment Apply  to affected area two (2) times daily as needed for Skin Irritation. use thin layer    naproxen (NAPROSYN) 500 mg tablet Take 1 Tab by mouth two (2) times daily (with meals).  methocarbamol (ROBAXIN) 750 mg tablet Take 1 Tab by mouth four (4) times daily as needed.  (Patient taking differently: Take 750 mg by mouth as needed.)    sildenafil (REVATIO) 20 mg tablet 1-2 tabs 30 minutes before activity    albuterol (PROVENTIL HFA, VENTOLIN HFA, PROAIR HFA) 90 mcg/actuation inhaler Take 2 Puffs by inhalation every six (6) hours as needed for Wheezing. Indications: CHRONIC OBSTRUCTIVE PULMONARY DISEASE    budesonide-formoterol (SYMBICORT) 80-4.5 mcg/actuation HFAA inhaler Take 2 Puffs by inhalation two (2) times a day.  diazePAM (VALIUM) 10 mg tablet Take one hour prior to procedure     No current facility-administered medications for this visit. Allergies   Allergen Reactions    Vicodin [Hydrocodone-Acetaminophen] Itching       Past Surgical History:   Procedure Laterality Date    HX COLONOSCOPY  12-    4 polyps removed    HX ORTHOPAEDIC Left 2003    left lateral meniscus amputated    HX UROLOGICAL Left 04/27/2017    Cysto, bilateral RPG, left URS and placement of left double J ureteral stent - Dr. Bismark López       Family History   Problem Relation Age of Onset    Diabetes Mother     Stroke Mother     Arthritis-osteo Father     Hypertension Father     Arthritis-osteo Brother     Heart Attack Brother     Diabetes Maternal Grandmother        Social History     Social History    Marital status:      Spouse name: N/A    Number of children: N/A    Years of education: N/A     Occupational History          Social History Main Topics    Smoking status: Current Every Day Smoker     Packs/day: 2.00     Years: 45.00     Types: Cigarettes     Start date: 1/1/1970     Last attempt to quit: 7/13/2016    Smokeless tobacco: Never Used    Alcohol use 0.0 oz/week     0 Standard drinks or equivalent per week      Comment: occasionally    Drug use: No    Sexual activity: Not Currently     Other Topics Concern    Not on file     Social History Narrative       Review of Systems   Constitutional: Negative for chills and fever. Genitourinary: Positive for dysuria and hematuria. Negative for flank pain.        Visit Vitals    /78 (BP 1 Location: Right arm, BP Patient Position: Sitting)    Pulse 69    Temp 96.4 °F (35.8 °C) (Oral)    Resp 18    Ht 6' 2\" (1.88 m)  Wt 340 lb (154.2 kg)    SpO2 97%    BMI 43.65 kg/m2       Physical Exam   Constitutional: He is oriented to person, place, and time. He appears well-developed and well-nourished. No distress. HENT:   Head: Normocephalic and atraumatic. Right Ear: External ear normal.   Left Ear: External ear normal.   Neurological: He is alert and oriented to person, place, and time. Skin: Skin is warm and dry. He is not diaphoretic. Psychiatric: He has a normal mood and affect. Results for orders placed or performed in visit on 05/11/17   AMB POC URINALYSIS DIP STICK AUTO W/ MICRO     Status: None   Result Value Ref Range Status    Color (UA POC) Dark Yellow  Final    Clarity (UA POC) Slightly Cloudy  Final    Glucose (UA POC) Negative Negative Final    Bilirubin (UA POC) Negative Negative Final    Ketones (UA POC) Negative Negative Final    Specific gravity (UA POC) 1.020 1.001 - 1.035 Final    Blood (UA POC) 3+ Negative Final    pH (UA POC) 5.5 4.6 - 8.0 Final    Protein (UA POC) 2+ Negative mg/dL Final    Urobilinogen (UA POC) 1 mg/dL 0.2 - 1 Final    Nitrites (UA POC) Negative Negative Final    Leukocyte esterase (UA POC) 1+ Negative Final   Results for orders placed or performed in visit on 03/14/17   AMB POC URINALYSIS DIP STICK AUTO W/O MICRO     Status: None   Result Value Ref Range Status    Color (UA POC) Yellow  Final    Clarity (UA POC) Clear  Final    Glucose (UA POC) 1+ Negative Final    Bilirubin (UA POC) Negative Negative Final    Ketones (UA POC) Negative Negative Final    Specific gravity (UA POC) 1.025 1.001 - 1.035 Final    Blood (UA POC) 1+ Negative Final    pH (UA POC) 6.5 4.6 - 8.0 Final    Protein (UA POC) Trace Negative mg/dL Final    Urobilinogen (UA POC) 1 mg/dL 0.2 - 1 Final    Nitrites (UA POC) Negative Negative Final    Leukocyte esterase (UA POC) Negative Negative Final                     Assesment:  1.  Morbid obesity due to excess calories (HCC)  Continue topamax and phentermine    2. Dysuria  Likely secondary to recent instrumentation, follow up with Dr Tameka Mckeon, urology  - AMB POC URINALYSIS DIP STICK AUTO W/ MICRO    3. Chronic right shoulder pain  Obtain US, scheduled at [facility] on 5/22  - oxyCODONE-acetaminophen (PERCOCET) 5-325 mg per tablet; Take 1 Tab by mouth two (2) times daily as needed for Pain. Max Daily Amount: 2 Tabs. Dispense: 30 Tab; Refill: 0      I have discussed the diagnosis with the patient and the intended management  The patient has received an after-visit summary and questions were answered concerning future plans. I have discussed medication usage, side effects and warnings with the patient as well. I have reviewed the plan of care with the patient, accepted their input and they are in agreement with the treatment goals. Follow-up Disposition:  Return in about 3 months (around 8/11/2017) for weight.       Laura Abarca MD                .

## 2017-05-22 DIAGNOSIS — E66.01 MORBID OBESITY DUE TO EXCESS CALORIES (HCC): ICD-10-CM

## 2017-05-22 RX ORDER — PHENTERMINE HYDROCHLORIDE 15 MG/1
CAPSULE ORAL
Qty: 90 CAP | Refills: 2 | Status: SHIPPED | OUTPATIENT
Start: 2017-05-22 | End: 2017-08-07 | Stop reason: SDUPTHER

## 2017-05-24 DIAGNOSIS — G89.29 CHRONIC RIGHT SHOULDER PAIN: ICD-10-CM

## 2017-05-24 DIAGNOSIS — M25.511 CHRONIC RIGHT SHOULDER PAIN: ICD-10-CM

## 2017-07-24 DIAGNOSIS — M62.830 SPASM OF BACK MUSCLES: ICD-10-CM

## 2017-07-24 RX ORDER — METHOCARBAMOL 750 MG/1
750 TABLET, FILM COATED ORAL
Qty: 40 TAB | Refills: 2 | Status: SHIPPED | OUTPATIENT
Start: 2017-07-24 | End: 2018-06-20 | Stop reason: SDUPTHER

## 2017-07-24 NOTE — TELEPHONE ENCOUNTER
Call from pt's wife, she states he has not needs the Robaxin lately but now his back is hurting and would like a refill. Per pharmacy they refill he had .

## 2017-08-01 ENCOUNTER — OFFICE VISIT (OUTPATIENT)
Dept: FAMILY MEDICINE CLINIC | Age: 52
End: 2017-08-01

## 2017-08-01 VITALS
RESPIRATION RATE: 18 BRPM | DIASTOLIC BLOOD PRESSURE: 79 MMHG | HEART RATE: 72 BPM | TEMPERATURE: 98.8 F | WEIGHT: 315 LBS | OXYGEN SATURATION: 97 % | BODY MASS INDEX: 40.43 KG/M2 | HEIGHT: 74 IN | SYSTOLIC BLOOD PRESSURE: 131 MMHG

## 2017-08-01 DIAGNOSIS — G89.29 CHRONIC RIGHT SHOULDER PAIN: ICD-10-CM

## 2017-08-01 DIAGNOSIS — K05.10 GINGIVITIS: ICD-10-CM

## 2017-08-01 DIAGNOSIS — K08.89 TOOTH ACHE: Primary | ICD-10-CM

## 2017-08-01 DIAGNOSIS — M25.511 CHRONIC RIGHT SHOULDER PAIN: ICD-10-CM

## 2017-08-01 RX ORDER — OXYCODONE AND ACETAMINOPHEN 5; 325 MG/1; MG/1
1 TABLET ORAL 2 TIMES DAILY
Qty: 14 TAB | Refills: 0 | Status: SHIPPED | OUTPATIENT
Start: 2017-08-01 | End: 2017-08-01 | Stop reason: SDUPTHER

## 2017-08-01 RX ORDER — AMOXICILLIN AND CLAVULANATE POTASSIUM 875; 125 MG/1; MG/1
1 TABLET, FILM COATED ORAL 2 TIMES DAILY
Qty: 14 TAB | Refills: 0 | Status: SHIPPED | OUTPATIENT
Start: 2017-08-01 | End: 2017-08-08

## 2017-08-01 RX ORDER — CHLORHEXIDINE GLUCONATE 1.2 MG/ML
15 RINSE ORAL 2 TIMES DAILY
Qty: 120 ML | Refills: 0 | Status: SHIPPED | OUTPATIENT
Start: 2017-08-01 | End: 2017-08-05

## 2017-08-01 RX ORDER — OXYCODONE AND ACETAMINOPHEN 5; 325 MG/1; MG/1
1 TABLET ORAL 2 TIMES DAILY
Qty: 28 TAB | Refills: 0 | Status: SHIPPED | OUTPATIENT
Start: 2017-08-01 | End: 2017-08-16

## 2017-08-01 NOTE — PATIENT INSTRUCTIONS
Head or Face Pain: Care Instructions  Your Care Instructions  Common causes of head or face pain are allergies, stress, and injuries. Other causes include tooth problems and sinus infections. Eating certain foods, such as chocolate or cheese, or drinking certain liquids, such as coffee or cola, can cause head pain for some people. If you have mild head pain, you may not need treatment. It is important to watch your symptoms and talk to your doctor if your pain continues or gets worse. Follow-up care is a key part of your treatment and safety. Be sure to make and go to all appointments, and call your doctor if you are having problems. It's also a good idea to know your test results and keep a list of the medicines you take. How can you care for yourself at home? · Take pain medicines exactly as directed. ¨ If the doctor gave you a prescription medicine for pain, take it as prescribed. ¨ If you are not taking a prescription pain medicine, ask your doctor if you can take an over-the-counter pain medicine. · Take it easy for the next few days or longer if you are not feeling well. · Use a warm, moist towel or heating pad set on low to relax tight muscles in your shoulder and neck. Have someone gently massage your neck and shoulders. · Put ice or a cold pack on the area for 10 to 20 minutes at a time. Put a thin cloth between the ice and your skin. When should you call for help? Call 911 anytime you think you may need emergency care. For example, call if:  · You have twitching, jerking, or a seizure. · You passed out (lost consciousness). · You have symptoms of a stroke. These may include:  ¨ Sudden numbness, tingling, weakness, or loss of movement in your face, arm, or leg, especially on only one side of your body. ¨ Sudden vision changes. ¨ Sudden trouble speaking. ¨ Sudden confusion or trouble understanding simple statements. ¨ Sudden problems with walking or balance.   ¨ A sudden, severe headache that is different from past headaches. · You have jaw pain and pain in your chest, shoulder, neck, or arm. Call your doctor now or seek immediate medical care if:  · You have a fever with a stiff neck or a severe headache. · You have nausea and vomiting, or you cannot keep food or liquids down. Watch closely for changes in your health, and be sure to contact your doctor if:  · Your head or face pain does not get better as expected. Where can you learn more? Go to http://nohelia-martina.info/. Enter P568 in the search box to learn more about \"Head or Face Pain: Care Instructions. \"  Current as of: March 20, 2017  Content Version: 11.3  © 1518-5090 [employer]. Care instructions adapted under license by [employer] (which disclaims liability or warranty for this information). If you have questions about a medical condition or this instruction, always ask your healthcare professional. [place] [address] any warranty or liability for your use of this information. Periodontal Conditions: Care Instructions  Your Care Instructions    Periodontal conditions affect the gums, bone, and tissue that surround and support the teeth. The most common problems are caused by plaque. Plaque is a thin film of bacteria that sticks to teeth above and below the gum line. It can build up and harden into tartar. The bacteria in plaque and tartar can cause gum disease. Gingivitis is a disease that affects the gums (gingiva). The gums are the soft tissue that surrounds the teeth. Gingivitis causes red, swollen, tender gums that bleed easily when brushed, persistent bad breath, and sensitive teeth. Because it is not painful, many people do not get treatment when they should. Gingivitis can be reversed with good dental care. Periodontitis is a more advanced disease that affects more than the gums. The gums pull away from the teeth.  This leaves deep pockets where bacteria can grow. The disease can damage the bones that support the teeth. The teeth may get loose and fall out. A periodontal condition should be treated as soon as it is found. Finding gum problems early, treating them right away, and having regular checkups bring the best results. You can treat mild periodontal conditions by brushing and flossing your teeth every day. Your dentist may prescribe a mouthwash to kill the bacteria that can damage teeth and gums. Your dentist may have you take antibiotics to treat infection from moderate periodontal disease. If your gums have pulled away from your teeth, you may need cleaning between the teeth and gums right down to the teeth roots. This is called root planing and scaling. If you have severe periodontal disease, you may need surgery to remove diseased gum tissue or repair bone damage. Follow-up care is a key part of your treatment and safety. Be sure to make and go to all appointments, and call your dentist if you are having problems. It's also a good idea to know your test results and keep a list of the medicines you take. How can you care for yourself at home? · If your dentist prescribed antibiotics, take them as directed. Do not stop taking them just because you feel better. You need to take the full course of antibiotics. · Brush your teeth twice a day, in the morning and at night. ¨ Use a toothbrush with soft, rounded-end bristles and a head that is small enough to reach all parts of your teeth and mouth. Replace your toothbrush every 3 to 4 months. ¨ Use a fluoride toothpaste. ¨ Place the brush at a 45-degree angle where the teeth meet the gums. Press firmly, and gently rock the brush back and forth using small circular movements. ¨ Brush chewing surfaces vigorously with short back-and-forth strokes. ¨ Brush your tongue from back to front. · Floss at least once a day.  Choose the type and flavor that you like best.  · Have your teeth cleaned by a professional at least twice a year. · Ask your dentist about using an antibacterial mouthwash to help reduce bacteria. · Rinse your mouth with water or chew sugar-free gum after meals if you can't brush your teeth. · Do not smoke or use smokeless tobacco. Tobacco use can cause periodontal disease. When should you call for help? Call your dentist now or seek immediate medical care if:  · You have symptoms of infection, such as:  ¨ Increased pain, swelling, warmth, or redness. ¨ Red streaks leading from the area. ¨ Pus draining from the area. ¨ A fever. Watch closely for changes in your health, and be sure to contact your dentist if:  · You have new or worse tooth pain. · You do not get better as expected. Where can you learn more? Go to http://nohelia-martina.info/. Enter L645 in the search box to learn more about \"Periodontal Conditions: Care Instructions. \"  Current as of: January 6, 2017  Content Version: 11.3  © 1721-5440 [employer]. Care instructions adapted under license by [employer] (which disclaims liability or warranty for this information). If you have questions about a medical condition or this instruction, always ask your healthcare professional. [place] [address] any warranty or liability for your use of this information.

## 2017-08-01 NOTE — PROGRESS NOTES
Today's Date:  2017   Patient:  Rose Marie Morales  Patient :  1965    Subjective:   Rose Marie Morales is a 46 y.o. male who presents with c/o Tooth ache and Chronic back Pain. States that tooth ache started about three days ago and is getting worse. Has taken pain medication which help but pain comes back. States that this is a new problem but had the same problem with other teeth that he eventually lost. Has no other complaints at this time. Current Outpatient Meds and Allergies     Current Outpatient Prescriptions on File Prior to Visit   Medication Sig Dispense Refill    methocarbamol (ROBAXIN) 750 mg tablet Take 1 Tab by mouth four (4) times daily as needed. 40 Tab 2    phentermine (ADIPEX_P) 15 mg capsule 2 tabs in the morning and 1 tab in early afternoon  Indications: WEIGHT LOSS MANAGEMENT FOR OBESE PATIENT (BMI >= 30) 90 Cap 2    omeprazole (PRILOSEC) 40 mg capsule Take 1 Cap by mouth daily. 90 Cap 2    topiramate (TOPAMAX) 50 mg tablet Take 1 Tab by mouth two (2) times a day. 180 Tab 1    rosuvastatin (CRESTOR) 20 mg tablet Take 1 Tab by mouth nightly. 90 Tab 4    triamcinolone acetonide (KENALOG) 0.5 % ointment Apply  to affected area two (2) times daily as needed for Skin Irritation. use thin layer 30 g 0    sildenafil (REVATIO) 20 mg tablet 1-2 tabs 30 minutes before activity 30 Tab 1    albuterol (PROVENTIL HFA, VENTOLIN HFA, PROAIR HFA) 90 mcg/actuation inhaler Take 2 Puffs by inhalation every six (6) hours as needed for Wheezing. Indications: CHRONIC OBSTRUCTIVE PULMONARY DISEASE 1 Inhaler 2    budesonide-formoterol (SYMBICORT) 80-4.5 mcg/actuation HFAA inhaler Take 2 Puffs by inhalation two (2) times a day. 1 Inhaler 3    naproxen (NAPROSYN) 500 mg tablet Take 1 Tab by mouth two (2) times daily (with meals). 60 Tab 2     No current facility-administered medications on file prior to visit. These medications have been reviewed and reconciled with the patient during today's visit. Allergies   Allergen Reactions    Vicodin [Hydrocodone-Acetaminophen] Itching         ROS:     CONST:   Denies fatigue, weight change, appetite change   NEURO:   Denies headaches, vision changes, dizziness, loss of consciousness  MOUTH: Lower molar pain, jaw pain  CV:      Denies chest pain, palpitations, orthopnea, PND  PULM:  Denies SOB, wheezing, cough, hemoptysis  MS:      Right shoulder, bilateral knee and low back pain; no joint swelling    Objective:     VS:    Visit Vitals    /79 (BP 1 Location: Left arm, BP Patient Position: Sitting)    Pulse 72    Temp 98.8 °F (37.1 °C) (Oral)    Resp 18    Ht 6' 2\" (1.88 m)    Wt 338 lb (153.3 kg)    SpO2 97%    BMI 43.4 kg/m2       General:   Well-nourished, well-groomed, pleasant, alert, in no acute distress. Head:  Normocephalic, atraumatic, MMM,   Ears:  External ears WNL, TMs WNL,   Mouth:  Missing multiple molars; right lower single molar loose with surrounding erythema, right jaw swelling,   Neck:  Neck supple with normal ROM for age, no thyromegaly,  Cardiovasc:   Regular rate and rhythm, no murmurs, no rubs, no gallops,   Pulmonary:   Clear breath sounds bilaterally, good air movement, no wheezing, no rales, no rhonchi, normal respiratory effort  Extremities:   No edema, no tenderness with palpation of calves, warm and well-perfused  Neuro:   Alert, conversant, appropriate, following commands, no focal deficits. Assessment:       1. Tooth ache    2. Gingivitis    3. Chronic right shoulder pain        Plan:       Orders Placed This Encounter    DISCONTD: oxyCODONE-acetaminophen (PERCOCET) 5-325 mg per tablet     Sig: Take 1 Tab by mouth two (2) times a day. Max Daily Amount: 2 Tabs. Indications: Pain     Dispense:  14 Tab     Refill:  0    chlorhexidine (PERIDEX) 0.12 % solution     Sig: 15 mL by Swish and Spit route two (2) times a day for 4 days.      Dispense:  120 mL     Refill:  0    amoxicillin-clavulanate (AUGMENTIN) 875-125 mg per tablet     Sig: Take 1 Tab by mouth two (2) times a day for 7 days. Dispense:  14 Tab     Refill:  0    oxyCODONE-acetaminophen (PERCOCET) 5-325 mg per tablet     Sig: Take 1 Tab by mouth two (2) times a day. Max Daily Amount: 2 Tabs. Indications: Pain     Dispense:  28 Tab     Refill:  0        verified  I have discussed the diagnosis with the patient and the intended plan as seen in the above orders. The patient has received an after-visit summary along with patient information handout. I have discussed medication side effects and warnings with the patient as well. Pt verbalized understanding. Follow-up Disposition:  Return if symptoms worsen or fail to improve.   GUILHERME Toure  8/1/2017, 3:17 PM

## 2017-08-01 NOTE — PROGRESS NOTES
Wisam Parrish is a 46 y.o. male here today with c/o lower right tooth pain. Started 3  days ago. Patient would also like a refill on his Percocet. He is aware that Dr. Shilpa Menchaca is out of the office for the next 2 weeks. Learning assessment previously completed. 1. Have you been to the ER, urgent care clinic or hospitalized since your last visit? no     2. Have you seen or consulted any other health care providers outside of the [address] [place] [address] since your last visit (Include any pap smears or colon screening)? no     Do you have an Advanced Directive? Yes    Would you like information on Advanced Directives?  no

## 2017-08-07 DIAGNOSIS — E66.01 MORBID OBESITY DUE TO EXCESS CALORIES (HCC): ICD-10-CM

## 2017-08-08 RX ORDER — PHENTERMINE HYDROCHLORIDE 15 MG/1
CAPSULE ORAL
Qty: 90 CAP | Refills: 0 | Status: SHIPPED | OUTPATIENT
Start: 2017-08-08 | End: 2017-09-14 | Stop reason: SDUPTHER

## 2017-08-16 ENCOUNTER — OFFICE VISIT (OUTPATIENT)
Dept: FAMILY MEDICINE CLINIC | Age: 52
End: 2017-08-16

## 2017-08-16 VITALS
HEIGHT: 74 IN | BODY MASS INDEX: 40.43 KG/M2 | WEIGHT: 315 LBS | HEART RATE: 77 BPM | OXYGEN SATURATION: 97 % | DIASTOLIC BLOOD PRESSURE: 79 MMHG | TEMPERATURE: 98.7 F | RESPIRATION RATE: 18 BRPM | SYSTOLIC BLOOD PRESSURE: 125 MMHG

## 2017-08-16 DIAGNOSIS — M25.50 MULTIPLE JOINT PAIN: ICD-10-CM

## 2017-08-16 DIAGNOSIS — G89.29 CHRONIC PAIN OF BOTH KNEES: ICD-10-CM

## 2017-08-16 DIAGNOSIS — R79.89 LOW TESTOSTERONE LEVEL IN MALE: ICD-10-CM

## 2017-08-16 DIAGNOSIS — M25.562 CHRONIC PAIN OF BOTH KNEES: ICD-10-CM

## 2017-08-16 DIAGNOSIS — R10.9 ABDOMINAL WALL PAIN: ICD-10-CM

## 2017-08-16 DIAGNOSIS — M25.561 CHRONIC PAIN OF BOTH KNEES: ICD-10-CM

## 2017-08-16 DIAGNOSIS — K04.7 TOOTH INFECTION: Primary | ICD-10-CM

## 2017-08-16 RX ORDER — NAPROXEN 500 MG/1
500 TABLET ORAL 2 TIMES DAILY WITH MEALS
Qty: 60 TAB | Refills: 2 | Status: SHIPPED | OUTPATIENT
Start: 2017-08-16 | End: 2017-12-01 | Stop reason: SDUPTHER

## 2017-08-16 RX ORDER — AMOXICILLIN AND CLAVULANATE POTASSIUM 875; 125 MG/1; MG/1
1 TABLET, FILM COATED ORAL 2 TIMES DAILY
Qty: 20 TAB | Refills: 0 | Status: SHIPPED | OUTPATIENT
Start: 2017-08-16 | End: 2017-08-26

## 2017-08-16 RX ORDER — OXYCODONE AND ACETAMINOPHEN 5; 325 MG/1; MG/1
1-2 TABLET ORAL
Qty: 60 TAB | Refills: 0 | Status: SHIPPED | OUTPATIENT
Start: 2017-08-16 | End: 2017-11-15 | Stop reason: SDUPTHER

## 2017-08-16 NOTE — PROGRESS NOTES
Dental Pain        HPI: Susan Hu is a 46 y.o. male WHITE [place]   Here in follow up of his right molar pain. Seen on 8/1 diagnosed with tooth infecton. Given Augmentin     History of low testosterone. Difficulties with attaining erection. Decreased libido. Significant fatigue. Had lab tests at [facility] greater than 1 year ago confirmed low T. Chronic abdominal discomfort. Achy pain in the LLQ that is contant. Present for greater than 1 year. Worse with twisting motions and laying on his left. Had recent abdominal CT scan, report reviewed. No characterization of soft tissues in report. Past Medical History:   Diagnosis Date    Asthma     Diabetes mellitus ([facility]     ED (erectile dysfunction)     GERD (gastroesophageal reflux disease)     Hematuria     Hypercholesterolemia 2010    Hyperlipidemia     Hypogonadism, male     Morbid obesity ([facility]        Current Outpatient Prescriptions   Medication Sig    amoxicillin-clavulanate (AUGMENTIN) 875-125 mg per tablet Take 1 Tab by mouth two (2) times a day for 10 days.  phentermine (ADIPEX_P) 15 mg capsule 2 tabs in the morning and 1 tab in early afternoon  Indications: WEIGHT LOSS MANAGEMENT FOR OBESE PATIENT (BMI >= 30)    oxyCODONE-acetaminophen (PERCOCET) 5-325 mg per tablet Take 1 Tab by mouth two (2) times a day. Max Daily Amount: 2 Tabs. Indications: Pain    methocarbamol (ROBAXIN) 750 mg tablet Take 1 Tab by mouth four (4) times daily as needed.  topiramate (TOPAMAX) 50 mg tablet Take 1 Tab by mouth two (2) times a day.  rosuvastatin (CRESTOR) 20 mg tablet Take 1 Tab by mouth nightly.  triamcinolone acetonide (KENALOG) 0.5 % ointment Apply  to affected area two (2) times daily as needed for Skin Irritation. use thin layer    naproxen (NAPROSYN) 500 mg tablet Take 1 Tab by mouth two (2) times daily (with meals).     albuterol (PROVENTIL HFA, VENTOLIN HFA, PROAIR HFA) 90 mcg/actuation inhaler Take 2 Puffs by inhalation every six (6) hours as needed for Wheezing. Indications: CHRONIC OBSTRUCTIVE PULMONARY DISEASE    budesonide-formoterol (SYMBICORT) 80-4.5 mcg/actuation HFAA inhaler Take 2 Puffs by inhalation two (2) times a day.  omeprazole (PRILOSEC) 40 mg capsule Take 1 Cap by mouth daily.  sildenafil (REVATIO) 20 mg tablet 1-2 tabs 30 minutes before activity     No current facility-administered medications for this visit. Allergies   Allergen Reactions    Vicodin [Hydrocodone-Acetaminophen] Itching       Past Surgical History:   Procedure Laterality Date    HX COLONOSCOPY  12-    4 polyps removed    HX HEENT      eye surgery as a child    HX ORTHOPAEDIC Left 2003    left lateral meniscus amputated    HX UROLOGICAL Left 04/27/2017    [facility]. Cysto, bilateral RPG, left URS and placement of left double J ureteral stent - Dr. Mark Anthony Truong.  HX UROLOGICAL  05/18/2017    [facility]. Cystoscopy, left ureteroscopy, holmium laser lithotripsy and left double-J ureteral stent removal. Dr. Mark Anthony Truong.        Family History   Problem Relation Age of Onset    Diabetes Mother     Stroke Mother     Arthritis-osteo Father     Hypertension Father     Arthritis-osteo Brother     Heart Attack Brother     Diabetes Maternal Grandmother        Social History     Social History    Marital status:      Spouse name: N/A    Number of children: N/A    Years of education: N/A     Occupational History          Social History Main Topics    Smoking status: Current Every Day Smoker     Packs/day: 2.00     Years: 45.00     Types: Cigarettes     Start date: 1/1/1970     Last attempt to quit: 7/13/2016    Smokeless tobacco: Never Used    Alcohol use No    Drug use: No    Sexual activity: Not Currently     Other Topics Concern    Not on file     Social History Narrative       ROS  GEN- NAD, AAOx3  CVS- RRR, +S1, +S2, No Murmurs, No JVD  PULM- CTABL, No W/R/R  EXT- No edema  NEURO-Normal Gait  PSYCH- Euthymic, normal afffect      Visit Vitals    /79 (BP 1 Location: Right arm, BP Patient Position: Sitting)    Pulse 77    Temp 98.7 °F (37.1 °C) (Oral)    Resp 18    Ht 6' 2\" (1.88 m)    Wt 328 lb (148.8 kg)    SpO2 97%    BMI 42.11 kg/m2       Physical Exam   Constitutional: He is oriented to person, place, and time. He appears well-developed and well-nourished. No distress. HENT:   Head: Normocephalic and atraumatic. Right Ear: External ear normal.   Left Ear: External ear normal.   Right lower molar- loose, gumline slightly edematous, no fluid pockets noted, +TTP   Cardiovascular: Normal rate and regular rhythm. No murmur heard. Pulmonary/Chest: Breath sounds normal. No respiratory distress. He has no wheezes. He has no rales. Abdominal: Soft. Bowel sounds are normal. He exhibits no distension. There is tenderness (LLQ, palpable ovoid shpaed subcutanious mass approximately 6 x2 cm in size). There is no rebound and no guarding. Neurological: He is alert and oriented to person, place, and time. Skin: Skin is warm and dry. He is not diaphoretic. Psychiatric: He has a normal mood and affect. Vitals reviewed. Assesment:  1. Tooth infection  Refill augmentin x 10 days, use clove oil as needed for pain  - amoxicillin-clavulanate (AUGMENTIN) 875-125 mg per tablet; Take 1 Tab by mouth two (2) times a day for 10 days. Dispense: 20 Tab; Refill: 0    2. Abdominal wall pain   Palpable subcutaneous mass, likely cystic. Will see if radiology can reread CT scan for further delineation    3. Low testosterone level in male  Will obtain [facility]    4. Chronic pain of both knees   Refill of percocet  - oxyCODONE-acetaminophen (PERCOCET) 5-325 mg per tablet; Take 1-2 Tabs by mouth every six (6) hours as needed for Pain. Max Daily Amount: 8 Tabs. Dispense: 60 Tab; Refill: 0    5. Multiple joint pain    - naproxen (NAPROSYN) 500 mg tablet;  Take 1 Tab by mouth two (2) times daily (with meals). Dispense: 60 Tab; Refill: 2  - oxyCODONE-acetaminophen (PERCOCET) 5-325 mg per tablet; Take 1-2 Tabs by mouth every six (6) hours as needed for Pain. Max Daily Amount: 8 Tabs. Dispense: 60 Tab; Refill: 0      I have discussed the diagnosis with the patient and the intended management  The patient has received an after-visit summary and questions were answered concerning future plans. I have discussed medication usage, side effects and warnings with the patient as well. I have reviewed the plan of care with the patient, accepted their input and they are in agreement with the treatment goals.          Follow-up Disposition: Not on File      Miriam Bello MD                .

## 2017-08-16 NOTE — PROGRESS NOTES
Jhonatan Magaña is a 46 y.o. male here today for a follow up on his weight loss. He also states his tooth is still hurting. Learning assessment previously completed. 1. Have you been to the ER, urgent care clinic or hospitalized since your last visit? no    2. Have you seen or consulted any other health care providers outside of the [address] [place] [address] since your last visit (Include any pap smears or colon screening)? no     Do you have an Advanced Directive? yes    Would you like information on Advanced Directives?  no

## 2017-08-16 NOTE — MR AVS SNAPSHOT
Visit Information Date & Time Provider Department Dept. Phone Encounter #  
 8/16/2017  8:00 AM [address], [address] [phone] [id number] Follow-up Instructions Return for after labs. Upcoming Health Maintenance Date Due DTaP/Tdap/Td series (1 - Tdap) 10/9/1986 FOBT Q 1 YEAR AGE 50-75 10/9/2015 INFLUENZA AGE 9 TO ADULT 8/1/2017 Allergies as of 8/16/2017  Review Complete On: 8/16/2017 By: [id number] Ruiz MD  
  
 Severity Noted Reaction Type Reactions Vicodin [Hydrocodone-acetaminophen] Medium 12/23/2015    Itching Current Immunizations  Never Reviewed No immunizations on file. Not reviewed this visit You Were Diagnosed With   
  
 Codes Comments Tooth infection    -  Primary ICD-10-CM: K04.7 ICD-9-CM: 139. 4 Abdominal wall pain     ICD-10-CM: R10.9 ICD-9-CM: 789.00 Chronic pain of both knees     ICD-10-CM: M25.561, M25.562, G89.29 ICD-9-CM: 719.46, 338.29 Multiple joint pain     ICD-10-CM: M25.50 ICD-9-CM: 719.49 Vitals BP Pulse Temp Resp Height(growth percentile) Weight(growth percentile) 125/79 (BP 1 Location: Right arm, BP Patient Position: Sitting) 77 98.7 °F (37.1 °C) (Oral) 18 6' 2\" (1.88 m) 328 lb (148.8 kg) SpO2 BMI Smoking Status 97% 42.11 kg/m2 Current Every Day Smoker Vitals History BMI and BSA Data Body Mass Index Body Surface Area  
 42.11 kg/m 2 2.79 m 2 Preferred Pharmacy Pharmacy Name Phone [id number] [address] [phone] Your Updated Medication List  
  
   
This list is accurate as of: 8/16/17  9:09 AM.  Always use your most recent med list.  
  
  
  
  
 albuterol 90 mcg/actuation inhaler Commonly known as:  PROVENTIL HFA, VENTOLIN HFA, PROAIR HFA Take 2 Puffs by inhalation every six (6) hours as needed for Wheezing. Indications: CHRONIC OBSTRUCTIVE PULMONARY DISEASE  
  
 amoxicillin-clavulanate 875-125 mg per tablet Commonly known as:  AUGMENTIN Take 1 Tab by mouth two (2) times a day for 10 days. budesonide-formoterol 80-4.5 mcg/actuation Hfaa inhaler Commonly known as:  SYMBICORT Take 2 Puffs by inhalation two (2) times a day. methocarbamol 750 mg tablet Commonly known as:  ROBAXIN Take 1 Tab by mouth four (4) times daily as needed. naproxen 500 mg tablet Commonly known as:  NAPROSYN Take 1 Tab by mouth two (2) times daily (with meals). omeprazole 40 mg capsule Commonly known as:  PRILOSEC Take 1 Cap by mouth daily. oxyCODONE-acetaminophen 5-325 mg per tablet Commonly known as:  PERCOCET Take 1-2 Tabs by mouth every six (6) hours as needed for Pain. Max Daily Amount: 8 Tabs. phentermine 15 mg capsule Commonly known as:  ADIPEX_P  
2 tabs in the morning and 1 tab in early afternoon  Indications: WEIGHT LOSS MANAGEMENT FOR OBESE PATIENT (BMI >= 30)  
  
 rosuvastatin 20 mg tablet Commonly known as:  CRESTOR Take 1 Tab by mouth nightly. sildenafil 20 mg tablet Commonly known as:  REVATIO  
1-2 tabs 30 minutes before activity  
  
 topiramate 50 mg tablet Commonly known as:  TOPAMAX Take 1 Tab by mouth two (2) times a day. triamcinolone acetonide 0.5 % ointment Commonly known as:  KENALOG Apply  to affected area two (2) times daily as needed for Skin Irritation. use thin layer Prescriptions Printed Refills  
 oxyCODONE-acetaminophen (PERCOCET) 5-325 mg per tablet 0 Sig: Take 1-2 Tabs by mouth every six (6) hours as needed for Pain. Max Daily Amount: 8 Tabs. Class: Print Route: Oral  
  
Prescriptions Sent to Pharmacy Refills  
 amoxicillin-clavulanate (AUGMENTIN) 875-125 mg per tablet 0 Sig: Take 1 Tab by mouth two (2) times a day for 10 days.   
 Class: Normal  
 Pharmacy: [address] [place] [address] #: [phone] Route: Oral  
 naproxen (NAPROSYN) 500 mg tablet 2 Sig: Take 1 Tab by mouth two (2) times daily (with meals). Class: Normal  
 Pharmacy: [address] [place] [address] #: [phone] Route: Oral  
  
Follow-up Instructions Return for after labs. Patient Instructions Dental Pain: After Your Visit Your Care Instructions The most common cause of dental pain is tooth decay. It can also be caused by an infection of the tooth (abscess) or gum, a tooth that has not broken all the way through the gum (impacted tooth), or a problem with the nerve-filled center of the tooth. Follow-up care is a key part of your treatment and safety. Be sure to make and go to all appointments, and call your doctor if you are having problems. Its also a good idea to know your test results and keep a list of the medicines you take. How can you care for yourself at home? · Contact a dentist for follow-up care. · Put ice or a cold pack on the outside of your mouth for 10 to 20 minutes at a time to reduce pain and swelling. Put a thin cloth between the ice and your skin. · Take an over-the-counter pain medicine, such as acetaminophen (Tylenol), ibuprofen (Advil, Motrin), or naproxen (Aleve). Read and follow all instructions on the label. · Do not take two or more pain medicines at the same time unless the doctor told you to. Many pain medicines have acetaminophen, which is Tylenol. Too much acetaminophen (Tylenol) can be harmful. · Rinse your mouth with warm salt water every 2 hours to help relieve pain and swelling from an infected tooth. Mix 1 teaspoon of salt in 8 ounces of water. · If your doctor prescribed antibiotics, take them as directed. Do not stop taking them just because you feel better.  You need to take the full course of antibiotics. When should you call for help? Call your doctor now or seek immediate medical care if: 
· You have signs of infection, such as: 
¨ Increased pain, swelling, warmth, or redness. ¨ Pus draining from the gum, tooth, or face. ¨ A fever. Watch closely for changes in your health, and be sure to contact your doctor if: 
· You do not get better as expected. Where can you learn more? Go to Gamersband.be Enter V264 in the search box to learn more about \"Dental Pain: After Your Visit. \"  
© 2758-6592 Healthwise, Incorporated. Care instructions adapted under license by Francisca Boswell (which disclaims liability or warranty for this information). This care instruction is for use with your licensed healthcare professional. If you have questions about a medical condition or this instruction, always ask your healthcare professional. [address] any warranty or liability for your use of this information. Content Version: 25.4.933852; Last Revised: May 17, 2013 Introducing [facility]! Dear Chavez Valdez: 
Thank you for requesting a Z2 account. Our records indicate that you already have an active Z2 account. You can access your account anytime at [email or web address] Did you know that you can access your hospital and ER discharge instructions at any time in Z2? You can also review all of your test results from your hospital stay or ER visit. Additional Information If you have questions, please visit the Frequently Asked Questions section of the Z2 website at [email or web address] Remember, Z2 is NOT to be used for urgent needs. For medical emergencies, dial 911. Now available from your iPhone and Android! Please provide this summary of care documentation to your next provider. Your primary care clinician is listed as Reji Dwyer.  If you have any questions after today's visit, please call [phone].

## 2017-08-16 NOTE — PATIENT INSTRUCTIONS
Dental Pain: After Your Visit  Your Care Instructions  The most common cause of dental pain is tooth decay. It can also be caused by an infection of the tooth (abscess) or gum, a tooth that has not broken all the way through the gum (impacted tooth), or a problem with the nerve-filled center of the tooth. Follow-up care is a key part of your treatment and safety. Be sure to make and go to all appointments, and call your doctor if you are having problems. Its also a good idea to know your test results and keep a list of the medicines you take. How can you care for yourself at home? · Contact a dentist for follow-up care. · Put ice or a cold pack on the outside of your mouth for 10 to 20 minutes at a time to reduce pain and swelling. Put a thin cloth between the ice and your skin. · Take an over-the-counter pain medicine, such as acetaminophen (Tylenol), ibuprofen (Advil, Motrin), or naproxen (Aleve). Read and follow all instructions on the label. · Do not take two or more pain medicines at the same time unless the doctor told you to. Many pain medicines have acetaminophen, which is Tylenol. Too much acetaminophen (Tylenol) can be harmful. · Rinse your mouth with warm salt water every 2 hours to help relieve pain and swelling from an infected tooth. Mix 1 teaspoon of salt in 8 ounces of water. · If your doctor prescribed antibiotics, take them as directed. Do not stop taking them just because you feel better. You need to take the full course of antibiotics. When should you call for help? Call your doctor now or seek immediate medical care if:  · You have signs of infection, such as:  ¨ Increased pain, swelling, warmth, or redness. ¨ Pus draining from the gum, tooth, or face. ¨ A fever. Watch closely for changes in your health, and be sure to contact your doctor if:  · You do not get better as expected. Where can you learn more?    Go to [employer].be  Enter P864 in the search box to learn more about \"Dental Pain: After Your Visit. \"   © 0642-6763 Healthwise, Incorporated. Care instructions adapted under license by Pippa Tenorio (which disclaims liability or warranty for this information). This care instruction is for use with your licensed healthcare professional. If you have questions about a medical condition or this instruction, always ask your healthcare professional. [address] any warranty or liability for your use of this information. Content Version: 07.6.018997;  Last Revised: May 17, 2013

## 2017-08-24 ENCOUNTER — LAB ONLY (OUTPATIENT)
Dept: FAMILY MEDICINE CLINIC | Age: 52
End: 2017-08-24

## 2017-08-24 ENCOUNTER — HOSPITAL ENCOUNTER (OUTPATIENT)
Dept: LAB | Age: 52
Discharge: HOME | End: 2017-08-24
Payer: COMMERCIAL

## 2017-08-24 DIAGNOSIS — E29.1 MALE HYPOGONADISM: Primary | ICD-10-CM

## 2017-08-24 DIAGNOSIS — E29.1 MALE HYPOGONADISM: ICD-10-CM

## 2017-08-24 LAB
ERYTHROCYTE [DISTWIDTH] IN BLOOD BY AUTOMATED COUNT: 14 % (ref 11.6–14.5)
HCT VFR BLD AUTO: 52.8 % (ref 36–48)
HGB BLD-MCNC: 17.6 G/DL (ref 13–16)
MCH RBC QN AUTO: 30.3 PG (ref 24–34)
MCHC RBC AUTO-ENTMCNC: 33.3 G/DL (ref 31–37)
MCV RBC AUTO: 91 FL (ref 74–97)
PLATELET # BLD AUTO: 202 K/UL (ref 135–420)
PMV BLD AUTO: 10.2 FL (ref 9.2–11.8)
RBC # BLD AUTO: 5.8 M/UL (ref 4.7–5.5)
WBC # BLD AUTO: 6.9 K/UL (ref 4.6–13.2)

## 2017-08-24 PROCEDURE — 84403 ASSAY OF TOTAL TESTOSTERONE: CPT | Performed by: FAMILY MEDICINE

## 2017-08-24 PROCEDURE — 84153 ASSAY OF PSA TOTAL: CPT | Performed by: FAMILY MEDICINE

## 2017-08-24 PROCEDURE — 85027 COMPLETE CBC AUTOMATED: CPT | Performed by: FAMILY MEDICINE

## 2017-08-24 NOTE — PROGRESS NOTES
Na Hubbard is a 46 y.o. male here this morning for labs only. He tolerated well and left showing no s/s of distress.

## 2017-08-25 LAB
PSA SERPL-MCNC: 0.2 NG/ML (ref 0–4)
TESTOST FREE SERPL-MCNC: 9.4 PG/ML (ref 7.2–24)
TESTOST SERPL-MCNC: 362 NG/DL (ref 264–916)

## 2017-09-05 ENCOUNTER — TELEPHONE (OUTPATIENT)
Dept: FAMILY MEDICINE CLINIC | Age: 52
End: 2017-09-05

## 2017-09-05 NOTE — TELEPHONE ENCOUNTER
Could be related to fatigue, work stressors, perhaps seeking counseling services would be helpful.     [address] Maxime Ruiz MD

## 2017-09-05 NOTE — TELEPHONE ENCOUNTER
Vinay Tovar is checking on the CT scan to see if it can be reread to include soft tissue.     I cannot prescribe testosterone if testosterone levels are normal.    Thanks,  Becky Gonzalez MD

## 2017-09-05 NOTE — TELEPHONE ENCOUNTER
They understand that testosterone was normal but he has not been sexually active in over a year and wants to know what to do now.

## 2017-09-05 NOTE — TELEPHONE ENCOUNTER
Pts wife states pt was supposed to get some sort of scan done but has not heard from anyone. Also, pt was told his testosterone level was normal but he would still like to be prescribed something for it.  He does not like taking pills so would like cream.

## 2017-09-06 NOTE — TELEPHONE ENCOUNTER
Spoke with patient. He is upset that provider will not give him a cream. He declines any therapy relating to sex, stating \"I need something for my mike\".

## 2017-09-06 NOTE — TELEPHONE ENCOUNTER
Called [facility] radiology, left message for Dr. Mcclelland Duty to return call at his earliest convenience.

## 2017-09-14 ENCOUNTER — OFFICE VISIT (OUTPATIENT)
Dept: FAMILY MEDICINE CLINIC | Age: 52
End: 2017-09-14

## 2017-09-14 VITALS
TEMPERATURE: 98 F | DIASTOLIC BLOOD PRESSURE: 74 MMHG | HEART RATE: 80 BPM | RESPIRATION RATE: 18 BRPM | HEIGHT: 74 IN | WEIGHT: 315 LBS | BODY MASS INDEX: 40.43 KG/M2 | OXYGEN SATURATION: 98 % | SYSTOLIC BLOOD PRESSURE: 134 MMHG

## 2017-09-14 DIAGNOSIS — R53.83 FATIGUE, UNSPECIFIED TYPE: ICD-10-CM

## 2017-09-14 DIAGNOSIS — E66.01 MORBID OBESITY DUE TO EXCESS CALORIES (HCC): Primary | ICD-10-CM

## 2017-09-14 DIAGNOSIS — R22.9 MASS OF SUBCUTANEOUS TISSUE: ICD-10-CM

## 2017-09-14 RX ORDER — PHENTERMINE HYDROCHLORIDE 15 MG/1
CAPSULE ORAL
Qty: 90 CAP | Refills: 2 | Status: SHIPPED | OUTPATIENT
Start: 2017-09-14 | End: 2018-01-05 | Stop reason: SDUPTHER

## 2017-09-14 RX ORDER — PHENTERMINE HYDROCHLORIDE 15 MG/1
CAPSULE ORAL
Qty: 90 CAP | Refills: 0 | Status: SHIPPED | OUTPATIENT
Start: 2017-09-14 | End: 2017-09-14 | Stop reason: SDUPTHER

## 2017-09-14 NOTE — PROGRESS NOTES
Van Thornton is a 46 y.o. male here this morning for a follow up on his weight loss. Learning assessment previously completed. 1. Have you been to the ER, urgent care clinic or hospitalized since your last visit? no     2. Have you seen or consulted any other health care providers outside of the [address] [place] [address] since your last visit (Include any pap smears or colon screening)? no     Do you have an Advanced Directive? Yes    Would you like information on Advanced Directives?  no

## 2017-09-16 DIAGNOSIS — E66.01 MORBID OBESITY DUE TO EXCESS CALORIES (HCC): ICD-10-CM

## 2017-09-18 RX ORDER — TOPIRAMATE 50 MG/1
TABLET, FILM COATED ORAL
Qty: 180 TAB | Refills: 1 | Status: SHIPPED | OUTPATIENT
Start: 2017-09-18 | End: 2018-03-17 | Stop reason: SDUPTHER

## 2017-09-18 NOTE — PROGRESS NOTES
Weight Loss        HPI: Carmela Babin is a 46 y.o. male WHITE OR   Here in follow up of his weight loss. He is using his phentermine and topamax as prescribed. He denies adverse effect. He feels that it is curbing his appetite. He would also like to discuss his recent testosterone lab tests. They have returned back in the low normal range. He has been on testosterone replacement therapy in the past. He is having issues with decreased libido, ED, weight, fatigue and feeling of decreased muscle mass. Past Medical History:   Diagnosis Date    Asthma     Diabetes mellitus ([facility] [address]     ED (erectile dysfunction)     GERD (gastroesophageal reflux disease)     Hematuria     Hypercholesterolemia 2010    Hyperlipidemia     Hypogonadism, male     Morbid obesity ([facility] [address]        Current Outpatient Prescriptions   Medication Sig    phentermine (ADIPEX_P) 15 mg capsule 2 tabs in the morning and 1 tab in early afternoon  Indications: WEIGHT LOSS MANAGEMENT FOR OBESE PATIENT (BMI >= 30)    naproxen (NAPROSYN) 500 mg tablet Take 1 Tab by mouth two (2) times daily (with meals).  oxyCODONE-acetaminophen (PERCOCET) 5-325 mg per tablet Take 1-2 Tabs by mouth every six (6) hours as needed for Pain. Max Daily Amount: 8 Tabs.  methocarbamol (ROBAXIN) 750 mg tablet Take 1 Tab by mouth four (4) times daily as needed.  omeprazole (PRILOSEC) 40 mg capsule Take 1 Cap by mouth daily.  rosuvastatin (CRESTOR) 20 mg tablet Take 1 Tab by mouth nightly.  triamcinolone acetonide (KENALOG) 0.5 % ointment Apply  to affected area two (2) times daily as needed for Skin Irritation. use thin layer    albuterol (PROVENTIL HFA, VENTOLIN HFA, PROAIR HFA) 90 mcg/actuation inhaler Take 2 Puffs by inhalation every six (6) hours as needed for Wheezing. Indications: CHRONIC OBSTRUCTIVE PULMONARY DISEASE    budesonide-formoterol (SYMBICORT) 80-4.5 mcg/actuation HFAA inhaler Take 2 Puffs by inhalation two (2) times a day.  topiramate (TOPAMAX) 50 mg tablet TAKE 1 TABLET TWICE A DAY    sildenafil (REVATIO) 20 mg tablet 1-2 tabs 30 minutes before activity     No current facility-administered medications for this visit. Allergies   Allergen Reactions    Vicodin [Hydrocodone-Acetaminophen] Itching       Past Surgical History:   Procedure Laterality Date    HX COLONOSCOPY  12-    4 polyps removed    HX HEENT      eye surgery as a child    HX ORTHOPAEDIC Left 2003    left lateral meniscus amputated    HX UROLOGICAL Left 04/27/2017    [facility] Cysto, bilateral RPG, left URS and placement of left double J ureteral stent - Dr. Irma Anderson.  HX UROLOGICAL  05/18/2017    [facility]. Cystoscopy, left ureteroscopy, holmium laser lithotripsy and left double-J ureteral stent removal. Dr. Irma Anderson. Family History   Problem Relation Age of Onset    Diabetes Mother     Stroke Mother     Arthritis-osteo Father     Hypertension Father     Arthritis-osteo Brother     Heart Attack Brother     Diabetes Maternal Grandmother        Social History     Social History    Marital status:      Spouse name: N/A    Number of children: N/A    Years of education: N/A     Occupational History          Social History Main Topics    Smoking status: Current Every Day Smoker     Packs/day: 2.00     Years: 45.00     Types: Cigarettes     Start date: 1/1/1970     Last attempt to quit: 7/13/2016    Smokeless tobacco: Never Used    Alcohol use No    Drug use: No    Sexual activity: Not Currently     Other Topics Concern    Not on file     Social History Narrative       Review of Systems   Constitutional: Positive for malaise/fatigue. Negative for weight loss. Psychiatric/Behavioral: Negative for depression.          Visit Vitals    /74 (BP 1 Location: Right arm, BP Patient Position: Sitting)    Pulse 80    Temp 98 °F (36.7 °C) (Oral)    Resp 18    Ht 6' 2\" (1.88 m)    Wt 331 lb (150.1 kg)    SpO2 98%    BMI 42.5 kg/m2       Physical Exam   Constitutional: He is oriented to person, place, and time. He appears well-developed and well-nourished. No distress. HENT:   Head: Normocephalic and atraumatic. Right Ear: External ear normal.   Left Ear: External ear normal.   Neurological: He is alert and oriented to person, place, and time. Skin: Skin is warm and dry. He is not diaphoretic. Psychiatric: He has a normal mood and affect. He is aggressive. Nursing note and vitals reviewed. Lab Results   Component Value Date/Time    Testosterone 362 08/24/2017 08:20 AM       Assesment:  1. Morbid obesity due to excess calories (HCC)  Refill adipex today  - phentermine (ADIPEX_P) 15 mg capsule; 2 tabs in the morning and 1 tab in early afternoon  Indications: WEIGHT LOSS MANAGEMENT FOR OBESE PATIENT (BMI >= 30)  Dispense: 90 Cap; Refill: 2    2. Fatigue, unspecified type  I informed patient that I do not provide testosterone replacement when values are in the normal range; despite it being toward the lower end of that range. Mr Kris Olivarez states that his previous PCP as well as the [facility] [address] [facility] [address] [facility] [address] treated him when he had similar values. I informed him that may be something those providers do however that is not something I do. I did state that I suspected he have TRE and be evaluated for this. Treatment of this can improve his fatigue, help him to lose weight and improve his testosterone values. He agreed that he suspects he has sleep apnea but he declines further evaluation at this point. I stated that I would call a local endocrinologist to see if they would treat hs testosterone. 3. Mass of subcutaneous tissue  Referral to gen surgery to evaluate mass noted on 8/16  - [facility]          I have discussed the diagnosis with the patient and the intended management  The patient has received an after-visit summary and questions were answered concerning future plans.   I have discussed medication usage, side effects and warnings with the patient as well. I have reviewed the plan of care with the patient, accepted their input and they are in agreement with the treatment goals.          Chris Odonnell MD                .

## 2017-09-21 ENCOUNTER — DOCUMENTATION ONLY (OUTPATIENT)
Dept: FAMILY MEDICINE CLINIC | Age: 52
End: 2017-09-21

## 2017-09-25 ENCOUNTER — TELEPHONE (OUTPATIENT)
Dept: FAMILY MEDICINE CLINIC | Age: 52
End: 2017-09-25

## 2017-09-25 DIAGNOSIS — R68.82 DECREASED LIBIDO: Primary | ICD-10-CM

## 2017-09-26 ENCOUNTER — DOCUMENTATION ONLY (OUTPATIENT)
Dept: FAMILY MEDICINE CLINIC | Age: 52
End: 2017-09-26

## 2017-09-26 NOTE — PROGRESS NOTES
Received phone call from Dr Hina Hamilton, endocrinologist, in [place]. Based on his review of my chart note and available labs he would not recommend treatment with testosterone supplementation. Rather he would suggest that patient be treated for suspected TRE. He will not be seeing Mr Spence Osgood in consultation.     Raciel Marroquin MD  9/26/2017, 2:32 PM  Danial Degroot

## 2017-11-10 ENCOUNTER — OFFICE VISIT (OUTPATIENT)
Dept: SURGERY | Age: 52
End: 2017-11-10

## 2017-11-10 VITALS — RESPIRATION RATE: 18 BRPM | DIASTOLIC BLOOD PRESSURE: 76 MMHG | SYSTOLIC BLOOD PRESSURE: 134 MMHG

## 2017-11-10 DIAGNOSIS — R22.2 ABDOMINAL WALL MASS: Primary | ICD-10-CM

## 2017-11-10 NOTE — MR AVS SNAPSHOT
Visit Information Date & Time Provider Department Dept. Phone Encounter #  
 11/10/2017  9:00 AM Yenifer Greenfield MD [facility] [phone] Upcoming Health Maintenance Date Due DTaP/Tdap/Td series (1 - Tdap) 10/9/1986 FOBT Q 1 YEAR AGE 50-75 10/9/2015 Allergies as of 11/10/2017  Review Complete On: 11/10/2017 By: Yenifer Greenfield MD  
  
 Severity Noted Reaction Type Reactions Vicodin [Hydrocodone-acetaminophen] Medium 12/23/2015    Itching Current Immunizations  Never Reviewed No immunizations on file. Not reviewed this visit You Were Diagnosed With   
  
 Codes Comments Abdominal wall mass    -  Primary ICD-10-CM: R22.2 ICD-9-CM: 789.30 Vitals BP Resp Smoking Status 134/76 18 Current Every Day Smoker Vitals History Preferred Pharmacy Pharmacy Name Phone [address] [facility] [phone] Your Updated Medication List  
  
   
This list is accurate as of: 11/10/17  9:48 AM.  Always use your most recent med list.  
  
  
  
  
 albuterol 90 mcg/actuation inhaler Commonly known as:  PROVENTIL HFA, VENTOLIN HFA, PROAIR HFA Take 2 Puffs by inhalation every six (6) hours as needed for Wheezing. Indications: CHRONIC OBSTRUCTIVE PULMONARY DISEASE  
  
 budesonide-formoterol 80-4.5 mcg/actuation Hfaa Commonly known as:  SYMBICORT Take 2 Puffs by inhalation two (2) times a day. methocarbamol 750 mg tablet Commonly known as:  ROBAXIN Take 1 Tab by mouth four (4) times daily as needed. naproxen 500 mg tablet Commonly known as:  NAPROSYN Take 1 Tab by mouth two (2) times daily (with meals). omeprazole 40 mg capsule Commonly known as:  PRILOSEC Take 1 Cap by mouth daily. oxyCODONE-acetaminophen 5-325 mg per tablet Commonly known as:  PERCOCET  
 Take 1-2 Tabs by mouth every six (6) hours as needed for Pain. Max Daily Amount: 8 Tabs. phentermine 15 mg capsule Commonly known as:  ADIPEX_P  
2 tabs in the morning and 1 tab in early afternoon  Indications: WEIGHT LOSS MANAGEMENT FOR OBESE PATIENT (BMI >= 30)  
  
 rosuvastatin 20 mg tablet Commonly known as:  CRESTOR Take 1 Tab by mouth nightly. sildenafil 20 mg tablet Commonly known as:  REVATIO  
1-2 tabs 30 minutes before activity  
  
 topiramate 50 mg tablet Commonly known as:  TOPAMAX TAKE 1 TABLET TWICE A DAY  
  
 triamcinolone acetonide 0.5 % ointment Commonly known as:  KENALOG Apply  to affected area two (2) times daily as needed for Skin Irritation. use thin layer To-Do List   
 As directed Imaging:  CT ABD PELV W CONT Introducing [facility]! Dear Crissy Alford: 
Thank you for requesting a Blackfoot account. Our records indicate that you already have an active Blackfoot account. You can access your account anytime at [email or web address] Did you know that you can access your hospital and ER discharge instructions at any time in Blackfoot? You can also review all of your test results from your hospital stay or ER visit. Additional Information If you have questions, please visit the Frequently Asked Questions section of the Blackfoot website at [email or web address] Remember, Blackfoot is NOT to be used for urgent needs. For medical emergencies, dial 911. Now available from your iPhone and Android! Please provide this summary of care documentation to your next provider. Your primary care clinician is listed as Abdirizak Stauffer. If you have any questions after today's visit, please call [phone].

## 2017-11-10 NOTE — PROGRESS NOTES
Progress Note    Patient: Leora Fabry  MRN: [id number]  SSN: [id number]   YOB: 1965  Age: 46 y.o. Sex: male     Chief Complaint   Patient presents with    Cyst     left abdominal mass       HPI    Mr. Ward Tse is a 26-year-old obese gentleman who presents with what he feels like is a left abdominal wall subcutaneous mass. He has been told this by his PCP. He tells me this is somewhat tender. It is hard to appreciate although I do feel a subtle mass there and I suspect this is going to be a lipoma. It certainly could be a hernia. Past Medical History:   Diagnosis Date    Asthma     Diabetes mellitus ([address]     ED (erectile dysfunction)     GERD (gastroesophageal reflux disease)     Hematuria     Hypercholesterolemia 2010    Hyperlipidemia     Hypogonadism, male     Morbid obesity ([address]      Past Surgical History:   Procedure Laterality Date    HX COLONOSCOPY  12-    4 polyps removed    HX HEENT      eye surgery as a child    HX ORTHOPAEDIC Left 2003    left lateral meniscus amputated    HX UROLOGICAL Left 04/27/2017    [facility] Cysto, bilateral RPG, left URS and placement of left double J ureteral stent - Dr. Mitch Ham.  HX UROLOGICAL  05/18/2017    [facility]. Cystoscopy, left ureteroscopy, holmium laser lithotripsy and left double-J ureteral stent removal. Dr. Mitch Ham. Allergies   Allergen Reactions    Vicodin [Hydrocodone-Acetaminophen] Itching     Current Outpatient Prescriptions   Medication Sig Dispense Refill    topiramate (TOPAMAX) 50 mg tablet TAKE 1 TABLET TWICE A  Tab 1    phentermine (ADIPEX_P) 15 mg capsule 2 tabs in the morning and 1 tab in early afternoon  Indications: WEIGHT LOSS MANAGEMENT FOR OBESE PATIENT (BMI >= 30) 90 Cap 2    omeprazole (PRILOSEC) 40 mg capsule Take 1 Cap by mouth daily. 90 Cap 2    rosuvastatin (CRESTOR) 20 mg tablet Take 1 Tab by mouth nightly.  90 Tab 4    naproxen (NAPROSYN) 500 mg tablet Take 1 Tab by mouth two (2) times daily (with meals). 60 Tab 2    oxyCODONE-acetaminophen (PERCOCET) 5-325 mg per tablet Take 1-2 Tabs by mouth every six (6) hours as needed for Pain. Max Daily Amount: 8 Tabs. 60 Tab 0    methocarbamol (ROBAXIN) 750 mg tablet Take 1 Tab by mouth four (4) times daily as needed. 40 Tab 2    triamcinolone acetonide (KENALOG) 0.5 % ointment Apply  to affected area two (2) times daily as needed for Skin Irritation. use thin layer 30 g 0    sildenafil (REVATIO) 20 mg tablet 1-2 tabs 30 minutes before activity 30 Tab 1    albuterol (PROVENTIL HFA, VENTOLIN HFA, PROAIR HFA) 90 mcg/actuation inhaler Take 2 Puffs by inhalation every six (6) hours as needed for Wheezing. Indications: CHRONIC OBSTRUCTIVE PULMONARY DISEASE 1 Inhaler 2    budesonide-formoterol (SYMBICORT) 80-4.5 mcg/actuation HFAA inhaler Take 2 Puffs by inhalation two (2) times a day.  1 Inhaler 3     Social History     Social History    Marital status:      Spouse name: N/A    Number of children: N/A    Years of education: N/A     Occupational History          Social History Main Topics    Smoking status: Current Every Day Smoker     Packs/day: 2.00     Years: 45.00     Types: Cigarettes     Start date: 1/1/1970     Last attempt to quit: 7/13/2016    Smokeless tobacco: Never Used    Alcohol use No    Drug use: No    Sexual activity: Not Currently     Other Topics Concern    Not on file     Social History Narrative     Family History   Problem Relation Age of Onset    Diabetes Mother     Stroke Mother     Arthritis-osteo Father     Hypertension Father     Arthritis-osteo Brother     Heart Attack Brother     Diabetes Maternal Grandmother          Review of systems:  Patient denies any reflux, emesis, abdominal pain, change in bowel habits, hematochezia, melena, fever, weight loss, fatigue chills, dermatitis, abnormal moles, change in vision, vertigo, epistaxis, dysphagia, hoarseness, chest pain, palpitations, hypertension, edema, cough, shortness of breath, wheezing, hemoptysis, snoring, hematuria, diabetes, thyroid disease, anemia, bruising, history of blood transfusion, dizziness, headache, or fainting. Physical Examination    Well developed well nourished male in no apparent distress  Visit Vitals    /76    Resp 18      Head: normocephalic, atraumatic  Mouth: Clear, no overt lesions, oral mucosa pink and moist  Neck: supple, no masses, no adenopathy or carotid bruits, trachea midline  Resp: clear to auscultation bilaterally, no wheeze, rhonchi or rales, excursions normal and symmetrical  Cardio: Regular rate and rhythm, no murmurs, clicks, gallops or rubs, no edema or varicosities  Abdomen: soft, nontender, nondistended, normoactive bowel sounds, no hernias, no hepatosplenomegaly, vaguely  palpable thickness left and anterior abdominal wall  Back: Deferred  Extremeties: warm, well-perfused, no tenderness or swelling, normal gait/station  Neuro: sensation and strength grossly intact and symmetrical  Psych: alert and oriented to person, place and time  Breast exam deferred    IMPRESSION  Abdominal wall mass    PLAN  Orders Placed This Encounter    CT ABD PELV W CONT     Standing Status:   Future     Standing Expiration Date:   12/11/2018     Order Specific Question:   Is Patient Allergic to Contrast Dye?      Answer:   No     CT scan with contrast  Mirna Tidwell MD

## 2017-11-15 DIAGNOSIS — M25.561 CHRONIC PAIN OF BOTH KNEES: ICD-10-CM

## 2017-11-15 DIAGNOSIS — M25.562 CHRONIC PAIN OF BOTH KNEES: ICD-10-CM

## 2017-11-15 DIAGNOSIS — G89.29 CHRONIC PAIN OF BOTH KNEES: ICD-10-CM

## 2017-11-15 DIAGNOSIS — M25.50 MULTIPLE JOINT PAIN: ICD-10-CM

## 2017-11-17 RX ORDER — OXYCODONE AND ACETAMINOPHEN 5; 325 MG/1; MG/1
1-2 TABLET ORAL
Qty: 60 TAB | Refills: 0 | Status: SHIPPED | OUTPATIENT
Start: 2017-11-17 | End: 2018-03-19 | Stop reason: SDUPTHER

## 2017-11-22 ENCOUNTER — HOSPITAL ENCOUNTER (OUTPATIENT)
Dept: CT IMAGING | Age: 52
Discharge: HOME | End: 2017-11-22
Payer: COMMERCIAL

## 2017-11-22 DIAGNOSIS — R22.2 ABDOMINAL WALL MASS: ICD-10-CM

## 2017-11-22 PROCEDURE — 74177 CT ABD & PELVIS W/CONTRAST: CPT

## 2017-11-22 PROCEDURE — 74011636320 HC RX REV CODE- 636/320

## 2017-11-22 RX ADMIN — DIATRIZOATE MEGLUMINE AND DIATRIZOATE SODIUM 30 ML: 660; 100 LIQUID ORAL; RECTAL at 07:20

## 2017-11-22 RX ADMIN — IOPAMIDOL 100 ML: 612 INJECTION, SOLUTION INTRAVENOUS at 10:03

## 2017-12-01 DIAGNOSIS — M25.50 MULTIPLE JOINT PAIN: ICD-10-CM

## 2017-12-04 RX ORDER — NAPROXEN 500 MG/1
TABLET ORAL
Qty: 60 TAB | Refills: 2 | Status: SHIPPED | OUTPATIENT
Start: 2017-12-04 | End: 2019-02-18 | Stop reason: SDUPTHER

## 2017-12-05 DIAGNOSIS — J41.0 SIMPLE CHRONIC BRONCHITIS (HCC): ICD-10-CM

## 2017-12-05 RX ORDER — ALBUTEROL SULFATE 108 UG/1
AEROSOL, METERED RESPIRATORY (INHALATION)
Qty: 6.7 INHALER | Refills: 1 | Status: SHIPPED | OUTPATIENT
Start: 2017-12-05 | End: 2018-01-03 | Stop reason: SDUPTHER

## 2017-12-08 ENCOUNTER — OFFICE VISIT (OUTPATIENT)
Dept: SURGERY | Age: 52
End: 2017-12-08

## 2017-12-08 VITALS — RESPIRATION RATE: 18 BRPM | SYSTOLIC BLOOD PRESSURE: 134 MMHG | DIASTOLIC BLOOD PRESSURE: 76 MMHG

## 2017-12-08 DIAGNOSIS — R22.2 ABDOMINAL WALL MASS: Primary | ICD-10-CM

## 2017-12-08 PROBLEM — R91.1 PULMONARY NODULE: Status: ACTIVE | Noted: 2017-12-08

## 2017-12-08 NOTE — PROGRESS NOTES
Progress Note    Patient: Jamie Watters  MRN: [id number]  SSN: [id number]   YOB: 1965  Age: 46 y.o. Sex: male     Chief Complaint   Patient presents with    Follow-up     ct results       HPI    Her Bertell Leyden is a 26-year-old gentleman I saw several weeks ago with some concerns about a subcutaneous mass on his left flank. Obtained a CT scan of his abdomen that shows no mass present and his flank issue is done. CT also showed a left pulmonary nodule at 1.1 x 1.3 cm in size. This is unchanged for a year but we need to get him to a pulmonologist for possible biopsy as he is a smoker. Also the CT scan showed some nonspecific inguinal lymphadenopathy which I am not too concerned about after reviewing the images personally. Past Medical History:   Diagnosis Date    Asthma     Diabetes mellitus ([address]     ED (erectile dysfunction)     GERD (gastroesophageal reflux disease)     Hematuria     Hypercholesterolemia 2010    Hyperlipidemia     Hypogonadism, male     Morbid obesity ([address]      Past Surgical History:   Procedure Laterality Date    HX COLONOSCOPY  12-    4 polyps removed    HX HEENT      eye surgery as a child    HX ORTHOPAEDIC Left 2003    left lateral meniscus amputated    HX UROLOGICAL Left 04/27/2017    [facility] Cysto, bilateral RPG, left URS and placement of left double J ureteral stent - Dr. Kristin Cooper.  HX UROLOGICAL  05/18/2017    [facility]. Cystoscopy, left ureteroscopy, holmium laser lithotripsy and left double-J ureteral stent removal. Dr. Kristin Cooper.      Allergies   Allergen Reactions    Vicodin [Hydrocodone-Acetaminophen] Itching     Current Outpatient Prescriptions   Medication Sig Dispense Refill    PROVENTIL HFA 90 mcg/actuation inhaler INHALE 2 PUFFS BY MOUTH EVERY 6 HOURS AS NEEDED 6.7 Inhaler 1    naproxen (NAPROSYN) 500 mg tablet TAKE 1 TABLET BY MOUTH TWO TIMES A DAY 60 Tab 2    oxyCODONE-acetaminophen (PERCOCET) 5-325 mg per tablet Take 1-2 Tabs by mouth every six (6) hours as needed for Pain. Max Daily Amount: 8 Tabs. 60 Tab 0    topiramate (TOPAMAX) 50 mg tablet TAKE 1 TABLET TWICE A  Tab 1    phentermine (ADIPEX_P) 15 mg capsule 2 tabs in the morning and 1 tab in early afternoon  Indications: WEIGHT LOSS MANAGEMENT FOR OBESE PATIENT (BMI >= 30) 90 Cap 2    methocarbamol (ROBAXIN) 750 mg tablet Take 1 Tab by mouth four (4) times daily as needed. 40 Tab 2    omeprazole (PRILOSEC) 40 mg capsule Take 1 Cap by mouth daily. 90 Cap 2    rosuvastatin (CRESTOR) 20 mg tablet Take 1 Tab by mouth nightly. 90 Tab 4    triamcinolone acetonide (KENALOG) 0.5 % ointment Apply  to affected area two (2) times daily as needed for Skin Irritation. use thin layer 30 g 0    sildenafil (REVATIO) 20 mg tablet 1-2 tabs 30 minutes before activity 30 Tab 1    budesonide-formoterol (SYMBICORT) 80-4.5 mcg/actuation HFAA inhaler Take 2 Puffs by inhalation two (2) times a day.  1 Inhaler 3     Social History     Social History    Marital status:      Spouse name: N/A    Number of children: N/A    Years of education: N/A     Occupational History          Social History Main Topics    Smoking status: Current Every Day Smoker     Packs/day: 2.00     Years: 45.00     Types: Cigarettes     Start date: 1/1/1970     Last attempt to quit: 7/13/2016    Smokeless tobacco: Never Used    Alcohol use No    Drug use: No    Sexual activity: Not Currently     Other Topics Concern    Not on file     Social History Narrative     Family History   Problem Relation Age of Onset    Diabetes Mother     Stroke Mother     Arthritis-osteo Father     Hypertension Father     Arthritis-osteo Brother     Heart Attack Brother     Diabetes Maternal Grandmother          Review of systems:  Patient denies any reflux, emesis, abdominal pain, change in bowel habits, hematochezia, melena, fever, weight loss, fatigue chills, dermatitis, abnormal moles, change in vision, vertigo, epistaxis, dysphagia, hoarseness, chest pain, palpitations, hypertension, edema, cough, shortness of breath, wheezing, hemoptysis, snoring, hematuria, diabetes, thyroid disease, anemia, bruising, history of blood transfusion, dizziness, headache, or fainting. Physical Examination    Well developed well nourished male in no apparent distress  Visit Vitals    /76    Resp 18      Head: normocephalic, atraumatic  Mouth: Clear, no overt lesions, oral mucosa pink and moist  Neck: supple, no masses, no adenopathy or carotid bruits, trachea midline  Resp: clear to auscultation bilaterally, no wheeze, rhonchi or rales, excursions normal and symmetrical  Cardio: Regular rate and rhythm, no murmurs, clicks, gallops or rubs, no edema or varicosities  Abdomen: soft, nontender, nondistended, normoactive bowel sounds, no hernias, no hepatosplenomegaly,   Back: Deferred  Extremeties: warm, well-perfused, no tenderness or swelling, normal gait/station  Neuro: sensation and strength grossly intact and symmetrical  Psych: alert and oriented to person, place and time  Breast exam deferred no subcutaneous left flank mass. IMPRESSION  Stable but greater than 1 cm sized left pulmonary nodule without lymphadenopathy. Subtle bilateral inguinal mild lymphadenopathy of unknown etiology    PLAN  No orders of the defined types were placed in this encounter.     Refer to pulmonary for follow-up on his pulmonary nodule follow-up after his appointment there  Em Pino MD

## 2018-01-03 ENCOUNTER — OFFICE VISIT (OUTPATIENT)
Dept: FAMILY MEDICINE CLINIC | Age: 53
End: 2018-01-03

## 2018-01-03 VITALS
RESPIRATION RATE: 18 BRPM | TEMPERATURE: 98 F | DIASTOLIC BLOOD PRESSURE: 73 MMHG | SYSTOLIC BLOOD PRESSURE: 148 MMHG | HEIGHT: 74 IN | OXYGEN SATURATION: 98 % | WEIGHT: 315 LBS | HEART RATE: 74 BPM | BODY MASS INDEX: 40.43 KG/M2

## 2018-01-03 DIAGNOSIS — E66.01 MORBID OBESITY (HCC): ICD-10-CM

## 2018-01-03 DIAGNOSIS — J06.9 UPPER RESPIRATORY TRACT INFECTION, UNSPECIFIED TYPE: ICD-10-CM

## 2018-01-03 DIAGNOSIS — J44.9 MODERATE COPD (CHRONIC OBSTRUCTIVE PULMONARY DISEASE) (HCC): Primary | ICD-10-CM

## 2018-01-03 DIAGNOSIS — N52.9 ERECTILE DYSFUNCTION, UNSPECIFIED ERECTILE DYSFUNCTION TYPE: ICD-10-CM

## 2018-01-03 DIAGNOSIS — E78.49 OTHER HYPERLIPIDEMIA: ICD-10-CM

## 2018-01-03 DIAGNOSIS — R73.03 PREDIABETES: ICD-10-CM

## 2018-01-03 LAB — HBA1C MFR BLD HPLC: 5.8 %

## 2018-01-03 RX ORDER — ALBUTEROL SULFATE 90 UG/1
AEROSOL, METERED RESPIRATORY (INHALATION)
Qty: 3 INHALER | Refills: 1 | Status: SHIPPED | OUTPATIENT
Start: 2018-01-03 | End: 2018-01-05 | Stop reason: SDUPTHER

## 2018-01-03 RX ORDER — ROSUVASTATIN CALCIUM 20 MG/1
20 TABLET, COATED ORAL
Qty: 90 TAB | Refills: 2 | Status: SHIPPED | OUTPATIENT
Start: 2018-01-03 | End: 2018-10-29 | Stop reason: SDUPTHER

## 2018-01-03 RX ORDER — BUDESONIDE AND FORMOTEROL FUMARATE DIHYDRATE 80; 4.5 UG/1; UG/1
2 AEROSOL RESPIRATORY (INHALATION) 2 TIMES DAILY
Qty: 3 INHALER | Refills: 3 | Status: SHIPPED | OUTPATIENT
Start: 2018-01-03 | End: 2018-01-24 | Stop reason: SDUPTHER

## 2018-01-03 NOTE — PROGRESS NOTES
COPD        HPI: Stuart Cook is a 46 y.o. male [place]   Here in follow up of his COPD. He is using his symbicort and albuterol inhaler only on an as needed basis. He states this usually does help control his breathing symptoms as he only feels SOB when he has respiratory infection. He continues to smoke cigarettes. He has appointment scheduled with pulmonology for evaluation of lung nodule seen on recent abdominal CT and also seen on cardiact CT 1 year ago. The nodule has not grown in size. He has not had formal lung CT completed. He continues to have ED. He has followed up with endocrinology and has had repeat testosterone values which were normal.  He is frustrated by the fact that he cannot find a physician who will treat his low-normal testosterone with supplementation. He had been on testosterone supplementation in the past and reports no side effects and no issues with ED. He has had cold symptoms for the last several days. No fevers reported. Past Medical History:   Diagnosis Date    Asthma     ED (erectile dysfunction)     GERD (gastroesophageal reflux disease)     Hematuria     Hypercholesterolemia 2010    Hyperlipidemia     Hypogonadism, male     Morbid [address]        Current Outpatient Prescriptions   Medication Sig    phentermine (ADIPEX_P) 15 mg capsule 2 tabs in the morning and 1 tab in early afternoon  Indications: WEIGHT LOSS MANAGEMENT FOR OBESE PATIENT (BMI >= 30)    budesonide-formoterol (SYMBICORT) 80-4.5 mcg/actuation HFAA Take 2 Puffs by inhalation two (2) times a day.  rosuvastatin (CRESTOR) 20 mg tablet Take 1 Tab by mouth nightly.  naproxen (NAPROSYN) 500 mg tablet TAKE 1 TABLET BY MOUTH TWO TIMES A DAY    oxyCODONE-acetaminophen (PERCOCET) 5-325 mg per tablet Take 1-2 Tabs by mouth every six (6) hours as needed for Pain. Max Daily Amount: 8 Tabs.     topiramate (TOPAMAX) 50 mg tablet TAKE 1 TABLET TWICE A DAY    methocarbamol (ROBAXIN) 750 mg tablet Take 1 Tab by mouth four (4) times daily as needed.  omeprazole (PRILOSEC) 40 mg capsule Take 1 Cap by mouth daily.  triamcinolone acetonide (KENALOG) 0.5 % ointment Apply  to affected area two (2) times daily as needed for Skin Irritation. use thin layer    sildenafil (REVATIO) 20 mg tablet 1-2 tabs 30 minutes before activity    albuterol (PROVENTIL HFA) 90 mcg/actuation inhaler INHALE 2 PUFFS BY MOUTH EVERY 6 HOURS AS NEEDED     No current facility-administered medications for this visit. Allergies   Allergen Reactions    Vicodin [Hydrocodone-Acetaminophen] Itching       Past Surgical History:   Procedure Laterality Date    HX COLONOSCOPY  12-    4 polyps removed    HX HEENT      eye surgery as a child    HX ORTHOPAEDIC Left 2003    left lateral meniscus amputated    HX UROLOGICAL Left 04/27/2017    [facility] Cysto, bilateral RPG, left URS and placement of left double J ureteral stent - Dr. Moura Fails.  HX UROLOGICAL  05/18/2017    [facility]. Cystoscopy, left ureteroscopy, holmium laser lithotripsy and left double-J ureteral stent removal. Dr. Moura Fails.        Family History   Problem Relation Age of Onset    Diabetes Mother     Stroke Mother     Arthritis-osteo Father     Hypertension Father     Arthritis-osteo Brother     Heart Attack Brother     Diabetes Maternal Grandmother        Social History     Social History    Marital status:      Spouse name: N/A    Number of children: N/A    Years of education: N/A     Occupational History          Social History Main Topics    Smoking status: Current Every Day Smoker     Packs/day: 2.00     Years: 45.00     Types: Cigarettes     Start date: 1/1/1970     Last attempt to quit: 7/13/2016    Smokeless tobacco: Never Used    Alcohol use No    Drug use: No    Sexual activity: Not Currently     Other Topics Concern    Not on file     Social History Narrative       Review of Systems   Constitutional: Negative for chills and fever. Respiratory: Positive for cough. Negative for hemoptysis and shortness of breath. Cardiovascular: Negative for chest pain. Genitourinary:        + erectile dysfunction. He has used sildenafil 20mg only once and did not find this effective. Musculoskeletal: Positive for joint pain (chronic shoulder and knee pain; has not followed back up with ortho). Psychiatric/Behavioral: Negative for depression. Visit Vitals    /73 (BP 1 Location: Right arm, BP Patient Position: Sitting)    Pulse 74    Temp 98 °F (36.7 °C) (Oral)    Resp 18    Ht 6' 2\" (1.88 m)    Wt 329 lb (149.2 kg)    SpO2 98%    BMI 42.24 kg/m2       Physical Exam   Constitutional: He appears well-developed and well-nourished. No distress. HENT:   Head: Normocephalic and atraumatic. Right Ear: External ear normal.   Left Ear: External ear normal.   Cardiovascular: Normal rate, regular rhythm and normal heart sounds. Pulmonary/Chest: Effort normal. No respiratory distress. He has wheezes. He has no rales. Skin: Skin is warm and dry. He is not diaphoretic. Psychiatric: He has a normal mood and affect. His behavior is normal. Thought content normal.   Nursing note and vitals reviewed. Results for orders placed or performed in visit on 01/03/18   AMB POC HEMOGLOBIN A1C   Result Value Ref Range    Hemoglobin A1c (POC) 5.8 %       Assesment:    ICD-10-CM ICD-9-CM    1. Moderate COPD (chronic obstructive pulmonary disease) ([facility]) J44.9 496 budesonide-formoterol (SYMBICORT) 80-4.5 mcg/actuation HFAA      DISCONTINUED: albuterol (PROVENTIL HFA) 90 mcg/actuation inhaler   2. Upper respiratory tract infection, unspecified type J06.9 465.9    3. Other hyperlipidemia E78.4 272.4 rosuvastatin (CRESTOR) 20 mg tablet   4. Morbid obesity ([facility]) E66.01 278.01 AMB POC HEMOGLOBIN A1C      phentermine (ADIPEX_P) 15 mg capsule   5. Prediabetes R73.03 790.29    6.  Erectile dysfunction, unspecified erectile dysfunction type N52.9 607.84      1. Reasonable symptomatic control. Asked that patient use symbicort regularly. 2. Please make sure to get lots of rest, drink plenty of fluids at least eight 8-ounce glasses daily. Please start Vitamin C supplement of 1000mg daily and a daily multivitamin with zinc in it or a zinc supplementation. Use warm mist vaporizer/ humidifier and Vicks vaporub around the nose to help open up your nasal passages. Delsym is a very good cough syrup. Use ibuprofen as needed for pain and fevers. Please follow up if you are not improving in 1 week or if your symptoms change. 3. Refilled crestor today  4. Refilled adipex today. 5. New diagnosis. Continued weight loss efforts. No new rx today  6. Asked that patient use  mg of sildenafil to see if this would help. Also suggested that he follow up with urology for consultation. Also discussed sleep referral as suspected sleep apnea could be contributing to ED, obesity and low normal testosterone. He is not wanting to do this at this time. I have discussed the diagnosis with the patient and the intended management  The patient has received an after-visit summary and questions were answered concerning future plans. I have discussed medication usage, side effects and warnings with the patient as well. I have reviewed the plan of care with the patient, accepted their input and they are in agreement with the treatment goals. Follow-up Disposition:  Return in about 3 months (around 4/3/2018) for copd.       Sukhdev Marin MD                .

## 2018-01-03 NOTE — PATIENT INSTRUCTIONS
Please make sure to get lots of rest, drink plenty of fluids at least eight 8-ounce glasses daily. Please start Vitamin C supplement of 1000mg daily and a daily multivitamin with zinc in it or a zinc supplementation. Use warm mist vaporizer/ humidifier and Vicks vaporub around the nose to help open up your nasal passages. Delsym is a very good cough syrup. Use ibuprofen as needed for pain and fevers. Please follow up if you are not improving in 1 week or if your symptoms change. Upper Respiratory Infection (Cold): Care Instructions  Your Care Instructions    An upper respiratory infection, or URI, is an infection of the nose, sinuses, or throat. URIs are spread by coughs, sneezes, and direct contact. The common cold is the most frequent kind of URI. The flu and sinus infections are other kinds of URIs. Almost all URIs are caused by viruses. Antibiotics won't cure them. But you can treat most infections with home care. This may include drinking lots of fluids and taking over-the-counter pain medicine. You will probably feel better in 4 to 10 days. The doctor has checked you carefully, but problems can develop later. If you notice any problems or new symptoms, get medical treatment right away. Follow-up care is a key part of your treatment and safety. Be sure to make and go to all appointments, and call your doctor if you are having problems. It's also a good idea to know your test results and keep a list of the medicines you take. How can you care for yourself at home? · To prevent dehydration, drink plenty of fluids, enough so that your urine is light yellow or clear like water. Choose water and other caffeine-free clear liquids until you feel better. If you have kidney, heart, or liver disease and have to limit fluids, talk with your doctor before you increase the amount of fluids you drink.   · Take an over-the-counter pain medicine, such as acetaminophen (Tylenol), ibuprofen (Advil, Motrin), or naproxen (Aleve). Read and follow all instructions on the label. · Before you use cough and cold medicines, check the label. These medicines may not be safe for young children or for people with certain health problems. · Be careful when taking over-the-counter cold or flu medicines and Tylenol at the same time. Many of these medicines have acetaminophen, which is Tylenol. Read the labels to make sure that you are not taking more than the recommended dose. Too much acetaminophen (Tylenol) can be harmful. · Get plenty of rest.  · Do not smoke or allow others to smoke around you. If you need help quitting, talk to your doctor about stop-smoking programs and medicines. These can increase your chances of quitting for good. When should you call for help? Call 911 anytime you think you may need emergency care. For example, call if:  ? · You have severe trouble breathing. ?Call your doctor now or seek immediate medical care if:  ? · You seem to be getting much sicker. ? · You have new or worse trouble breathing. ? · You have a new or higher fever. ? · You have a new rash. ? Watch closely for changes in your health, and be sure to contact your doctor if:  ? · You have a new symptom, such as a sore throat, an earache, or sinus pain. ? · You cough more deeply or more often, especially if you notice more mucus or a change in the color of your mucus. ? · You do not get better as expected. Where can you learn more? Go to http://nohelia-martina.info/. Enter F626 in the search box to learn more about \"Upper Respiratory Infection (Cold): Care Instructions. \"  Current as of: May 12, 2017  Content Version: 11.4  © 3996-4214 [employer]. Care instructions adapted under license by [employer] (which disclaims liability or warranty for this information).  If you have questions about a medical condition or this instruction, always ask your healthcare professional. [employer] disclaims any warranty or liability for your use of this information.

## 2018-01-03 NOTE — PROGRESS NOTES
Kristeen Cogan is a 46 y.o. male here for refills on his inhalers. He is seeing pulmonology tomorrow for nodules found in his lungs.

## 2018-01-05 DIAGNOSIS — J44.9 MODERATE COPD (CHRONIC OBSTRUCTIVE PULMONARY DISEASE) (HCC): ICD-10-CM

## 2018-01-05 RX ORDER — PHENTERMINE HYDROCHLORIDE 15 MG/1
CAPSULE ORAL
Qty: 90 CAP | Refills: 2 | OUTPATIENT
Start: 2018-01-05 | End: 2018-01-31 | Stop reason: SDUPTHER

## 2018-01-05 RX ORDER — BUDESONIDE AND FORMOTEROL FUMARATE DIHYDRATE 80; 4.5 UG/1; UG/1
2 AEROSOL RESPIRATORY (INHALATION) 2 TIMES DAILY
Qty: 1 INHALER | Refills: 3 | Status: CANCELLED | OUTPATIENT
Start: 2018-01-05 | End: 2018-02-04

## 2018-01-05 RX ORDER — ALBUTEROL SULFATE 90 UG/1
AEROSOL, METERED RESPIRATORY (INHALATION)
Qty: 3 INHALER | Refills: 1 | Status: SHIPPED | OUTPATIENT
Start: 2018-01-05 | End: 2018-01-05 | Stop reason: CLARIF

## 2018-01-23 ENCOUNTER — OFFICE VISIT (OUTPATIENT)
Dept: PULMONOLOGY | Age: 53
End: 2018-01-23

## 2018-01-23 VITALS
HEART RATE: 74 BPM | WEIGHT: 315 LBS | SYSTOLIC BLOOD PRESSURE: 124 MMHG | OXYGEN SATURATION: 95 % | BODY MASS INDEX: 40.43 KG/M2 | DIASTOLIC BLOOD PRESSURE: 80 MMHG | RESPIRATION RATE: 16 BRPM | HEIGHT: 74 IN | TEMPERATURE: 98.6 F

## 2018-01-23 DIAGNOSIS — K21.9 GASTROESOPHAGEAL REFLUX DISEASE WITHOUT ESOPHAGITIS: ICD-10-CM

## 2018-01-23 DIAGNOSIS — G89.29 CHRONIC PAIN OF BOTH KNEES: ICD-10-CM

## 2018-01-23 DIAGNOSIS — R91.1 LUNG NODULE: Primary | ICD-10-CM

## 2018-01-23 DIAGNOSIS — E66.01 MORBID OBESITY WITH BMI OF 40.0-44.9, ADULT (HCC): ICD-10-CM

## 2018-01-23 DIAGNOSIS — M25.562 CHRONIC PAIN OF BOTH KNEES: ICD-10-CM

## 2018-01-23 DIAGNOSIS — F17.200 TOBACCO DEPENDENCE: ICD-10-CM

## 2018-01-23 DIAGNOSIS — J44.9 COPD, MODERATE (HCC): ICD-10-CM

## 2018-01-23 DIAGNOSIS — R06.09 DYSPNEA ON EXERTION: ICD-10-CM

## 2018-01-23 DIAGNOSIS — M25.561 CHRONIC PAIN OF BOTH KNEES: ICD-10-CM

## 2018-01-23 RX ORDER — OMEPRAZOLE 40 MG/1
CAPSULE, DELAYED RELEASE ORAL
Qty: 90 CAP | Refills: 2 | Status: SHIPPED | OUTPATIENT
Start: 2018-01-23 | End: 2018-10-22 | Stop reason: SDUPTHER

## 2018-01-23 NOTE — PROGRESS NOTES
HISTORY OF PRESENT ILLNESS  Cha Suggs is a 46 y.o. male. HPI Comments: Referred for evaluation of left lung nodule seen on CT abdomen/pelvis 11/22/2017 done for abdominal pain. Pt is a current active smoker who presented with abdominal pain, CT done showed a LLL partially mineralized nodule similar to one visualized Dec 6 2016 during a cardiac CT. Pt denies chest pain but has intermittent shortness of breath without wheezing, often relieved by rescue Albuterol which  He takes on average 1-2 times per month. Pt also notes ambulation is limited by bilateral knee pains. Other complaints include occasional cough with clear phlegm, no hemoptysis. Pt with chronic left flank pain with onset after a violent sneeze/cough over 2 years ago. Workup was nondiagnostic. Pt's wife notes loud snoring without witnessed apneas. He denies daytime somnolence, memory loss or nonrefreshing sleep. Outside records review show PFT's with moderate obstruction FEV1 61% in 2016. Review of Systems   Constitutional: Positive for malaise/fatigue. Negative for chills, diaphoresis, fever and weight loss. HENT: Negative for congestion, ear discharge, ear pain, hearing loss, nosebleeds, sinus pain, sore throat and tinnitus. Eyes: Negative for blurred vision, double vision, photophobia, pain, discharge and redness. Respiratory: Positive for cough, sputum production and shortness of breath. Negative for hemoptysis, wheezing and stridor. Cardiovascular: Negative for chest pain, palpitations, orthopnea, claudication and PND. Leg swelling: occasional.   Gastrointestinal: Positive for abdominal pain. Negative for blood in stool, constipation, diarrhea, heartburn, melena, nausea and vomiting. Genitourinary: Negative for dysuria, flank pain, frequency, hematuria and urgency. Musculoskeletal: Positive for back pain and joint pain. Negative for falls, myalgias and neck pain. Skin: Negative for itching and rash.    Neurological: Negative for dizziness, tingling, tremors, sensory change, speech change, focal weakness, seizures, loss of consciousness, weakness and headaches. Endo/Heme/Allergies: Negative for environmental allergies and polydipsia. Does not bruise/bleed easily. Psychiatric/Behavioral: Negative for depression, hallucinations, memory loss, substance abuse and suicidal ideas. The patient is nervous/anxious. The patient does not have insomnia. Past Medical History:   Diagnosis Date    Asthma     ED (erectile dysfunction)     GERD (gastroesophageal reflux disease)     Hematuria     Hypercholesterolemia 2010    Hyperlipidemia     Hypogonadism, male     Kidney stone     Morbid obesity ([address]      Past Surgical History:   Procedure Laterality Date    HX COLONOSCOPY  12-    4 polyps removed    HX HEENT      eye surgery as a child    HX ORTHOPAEDIC Left 2003    left lateral meniscus amputated    HX UROLOGICAL Left 04/27/2017    [facility] Cysto, bilateral RPG, left URS and placement of left double J ureteral stent - Dr. Galina Baker.  HX UROLOGICAL  05/18/2017    [facility]. Cystoscopy, left ureteroscopy, holmium laser lithotripsy and left double-J ureteral stent removal. Dr. Galina Baker. Current Outpatient Prescriptions on File Prior to Visit   Medication Sig Dispense Refill    pantoprazole (PROTONIX) 40 mg tablet 40 mg.      sildenafil, antihypertensive, (REVATIO) 20 mg tablet 2-5 tabs 60 minutes before sexual activity 90 Tab 2    phentermine (ADIPEX_P) 15 mg capsule 2 tabs in the morning and 1 tab in early afternoon  Indications: WEIGHT LOSS MANAGEMENT FOR OBESE PATIENT (BMI >= 30) 90 Cap 2    budesonide-formoterol (SYMBICORT) 80-4.5 mcg/actuation HFAA Take 2 Puffs by inhalation two (2) times a day. 3 Inhaler 3    rosuvastatin (CRESTOR) 20 mg tablet Take 1 Tab by mouth nightly.  90 Tab 2    naproxen (NAPROSYN) 500 mg tablet TAKE 1 TABLET BY MOUTH TWO TIMES A DAY 60 Tab 2    oxyCODONE-acetaminophen (PERCOCET) 5-325 mg per tablet Take 1-2 Tabs by mouth every six (6) hours as needed for Pain. Max Daily Amount: 8 Tabs. 60 Tab 0    topiramate (TOPAMAX) 50 mg tablet TAKE 1 TABLET TWICE A  Tab 1    methocarbamol (ROBAXIN) 750 mg tablet Take 1 Tab by mouth four (4) times daily as needed. 40 Tab 2    omeprazole (PRILOSEC) 40 mg capsule Take 1 Cap by mouth daily. 90 Cap 2    triamcinolone acetonide (KENALOG) 0.5 % ointment Apply  to affected area two (2) times daily as needed for Skin Irritation. use thin layer 30 g 0    atorvastatin (LIPITOR) 40 mg tablet 40 mg.      Phentermine HCl, Bulk, 100 % powd Take  by Mouth Once a Day. 2 AM 1 PM       No current facility-administered medications on file prior to visit. Allergies   Allergen Reactions    Vicodin [Hydrocodone-Acetaminophen] Itching     Family History   Problem Relation Age of Onset    Diabetes Mother     Stroke Mother     Arthritis-osteo Father     Hypertension Father     Arthritis-osteo Brother     Heart Attack Brother     Diabetes Maternal Grandmother      Social History     Social History    Marital status:      Spouse name: N/A    Number of children: N/A    Years of education: N/A     Occupational History          Social History Main Topics    Smoking status: Current Every Day Smoker     Packs/day: 2.00     Years: 45.00     Types: Cigarettes     Start date: 1/1/1970     Last attempt to quit: 7/13/2016    Smokeless tobacco: Never Used    Alcohol use No    Drug use: No    Sexual activity: Not Currently     Other Topics Concern    Not on file     Social History Narrative     Blood pressure 124/80, pulse 74, temperature 98.6 °F (37 °C), temperature source Oral, resp. rate 16, height 6' 2\" (1.88 m), weight 152 kg (335 lb), SpO2 95 %. Physical Exam   Constitutional: He is oriented to person, place, and time. He appears well-developed. No distress. Morbidly obese    HENT:   Head: Normocephalic and atraumatic.    Nose: Nose normal.   Mouth/Throat: Oropharynx is clear and moist. No oropharyngeal exudate. Mallampati 2   Eyes: Conjunctivae and EOM are normal. Pupils are equal, round, and reactive to light. Right eye exhibits no discharge. Left eye exhibits no discharge. No scleral icterus. Neck: No JVD present. No tracheal deviation present. No thyromegaly present. Cardiovascular: Normal rate, regular rhythm, normal heart sounds and intact distal pulses. Exam reveals no gallop and no friction rub. No murmur heard. Pulmonary/Chest: Effort normal and breath sounds normal. No stridor. No respiratory distress. He has no wheezes. He has no rales. He exhibits no tenderness. Abdominal: Soft. Bowel sounds are normal. He exhibits no mass. Tenderness: mild left flank to deep palpation. There is no rebound. Musculoskeletal: Normal range of motion. He exhibits no edema or tenderness. Lymphadenopathy:     He has no cervical adenopathy. Neurological: He is alert and oriented to person, place, and time. Skin: Skin is warm and dry. No rash noted. He is not diaphoretic. No erythema. Psychiatric: He has a normal mood and affect. His behavior is normal. Judgment and thought content normal.     CT Results (most recent):    Results from Hospital Encounter encounter on 11/22/17   CT ABD PELV W CONT   Narrative CT Abdomen Pelvis with IV and PO contrast    Clinical information: Left abdomen and flank pain, clinical palpation of left  abdominal mass. Comparison: None available. Technique: Axial CT of the abdomen and pelvis after administration of  intravenous and oral contrast.  Coronal and sagittal reconstructions. Soft  tissue, lung, and bone window reconstructions.   All CT scans at this facility  are performed using dose optimization technique as appropriate to a performed  exam, to include automated exposure control, adjustment of the mA and/or kV  according to patient size (including appropriate matching for site-specific  examinations), or use of iterative reconstruction technique. Findings:       Abdomen:     - Peritoneum: No pneumoperitoneum or ascites. - Liver: Unremarkable for technique    - Bile ducts: No intrahepatic biliary dilatation. No extrahepatic biliary  dilatation.     - Gallbladder: Unremarkable for technique    - Pancreas: No pancreatic ductal dilatation. Unremarkable for technique    - Spleen: Unremarkable for technique    - Bowel: No pneumatosis or mesenteric venous gas. No obstruction. Appendix . -     - Adrenals: Unremarkable for technique    - Kidneys: Unremarkable for technique    - Retroperitoneal lymph nodes: No enlarged retroperitoneal lymph nodes evident  for technique. - Mesenteric lymph nodes: No enlarged mesenteric lymph nodes evident for  technique. - Osseous structures: Multilevel degenerative vertebral changes. - Lung bases: Redemonstration of left lower lobe nodule. Pelvis:    - Bladder: Underdistended. - Pelvic lymph nodes: Bilateral enlarged lymph nodes for example measuring 13 mm  short axis series 2 image 104, 12 mm short axis series 2 image 101. - Vessels: Atherosclerotic changes, stable. - Osseous structures: Multilevel degenerative vertebral changes. - Other: -    _______________           Impression Impression:     1. Specific to the history of palpable mass and left abdominal pain, no mass is  evident in the abdominal wall or in the left hemiabdomen to account for these  symptoms. Evaluation is specifically made to an area where a radiodense marker  has been placed prior to the examination as well. No hydroureteronephrosis or  urinary tract calculi evident at the level of the kidneys, ureters, urinary  bladder, or visualized urethra. Please correlate for possible radicular pattern  of symptoms, thoracic spine and lumbar spine MRI could be considered if  clinically indicated.  If there is continued concern for a focal mass, second  look ultrasound focused on the region of pain could be considered. 2. Left lower lobe 11 x 13 mm partially mineralized lung nodule with scattered  subcentimeter nodularity surrounding it. Further evaluation is recommended with  a formal chest CT. Lung cancer not excluded. The lesion appears similar to  December 6, 2016 cardiac CT where was incidentally noted as well. However the  patient has not had an interval formal CT to image the entire lungs in our  system for comparison. 3. Bilateral inguinal lymphadenopathy of unknown clinical significance. Please  correlate for lower extremity cellulitis if this patient is diabetic. -               ASSESSMENT and PLAN  Encounter Diagnoses   Name Primary?  Lung nodule Yes    Tobacco dependence     COPD, moderate (HCC)     Morbid obesity with BMI of 40.0-44.9, adult (HCC)     Gastroesophageal reflux disease without esophagitis     Chronic pain of both knees     Dyspnea on exertion      Incidental note of left lung nodule stable from 2016, however lung is not fully imaged. Schedule dedicated chest CT ASAP morteza as prior CT show some surrounding ?satellite lung nodules. Discussed diagnostic options with pt including PET/CT, bronchoscopy, needle biopsy. Also discussed risks and benefits of above tests including possibility of complications. Of course, further intervention depends on CT results, which I will call pt once done. Pt with apparent COPD, will schedule PFT's. Discussed smoking harm and need for quitting, however pt states that he has no intention of quitting in the near future. RTC after CT.   Face to face counseling took up over 50% of this 45 minute visit

## 2018-01-23 NOTE — PROGRESS NOTES
Chief Complaint   Patient presents with    New Patient     referred by DR. Perales for pulmonary nodule     Patient here for routine follow up visit.

## 2018-01-24 DIAGNOSIS — J44.9 MODERATE COPD (CHRONIC OBSTRUCTIVE PULMONARY DISEASE) (HCC): ICD-10-CM

## 2018-01-24 RX ORDER — BUDESONIDE AND FORMOTEROL FUMARATE DIHYDRATE 80; 4.5 UG/1; UG/1
2 AEROSOL RESPIRATORY (INHALATION) 2 TIMES DAILY
Qty: 1 INHALER | Refills: 5 | Status: SHIPPED | OUTPATIENT
Start: 2018-01-24 | End: 2019-02-05 | Stop reason: SDUPTHER

## 2018-01-24 NOTE — TELEPHONE ENCOUNTER
This is med is going to cost >$700 [facility], please send to local pharmacy and we will give them a savings card.

## 2018-01-26 DIAGNOSIS — R91.1 LUNG NODULE: Primary | ICD-10-CM

## 2018-01-26 NOTE — PROGRESS NOTES
Verbal Order with read back per Dr. Alfonso Alas MD  For PFT smart panel. AMB POC PFT complete w/ bronchodilator  AMB POC PFT complete w/o bronchodilator  Gas Dilute/ wash out lung vol w/wo distrib vet & vol  Diffusing capacity    Dr. Alfonso Alas MD will co-sign the orders.

## 2018-01-29 ENCOUNTER — HOSPITAL ENCOUNTER (OUTPATIENT)
Dept: CT IMAGING | Age: 53
Discharge: HOME | End: 2018-01-29
Attending: INTERNAL MEDICINE
Payer: COMMERCIAL

## 2018-01-29 DIAGNOSIS — R91.1 LUNG NODULE: ICD-10-CM

## 2018-01-29 PROCEDURE — 71250 CT THORAX DX C-: CPT

## 2018-01-30 ENCOUNTER — CLINICAL SUPPORT (OUTPATIENT)
Dept: PULMONOLOGY | Age: 53
End: 2018-01-30

## 2018-01-30 VITALS — RESPIRATION RATE: 20 BRPM | HEIGHT: 74 IN | WEIGHT: 315 LBS | BODY MASS INDEX: 40.43 KG/M2

## 2018-01-30 DIAGNOSIS — R91.1 LUNG NODULE: ICD-10-CM

## 2018-01-30 DIAGNOSIS — R59.0 MEDIASTINAL ADENOPATHY: Primary | ICD-10-CM

## 2018-01-31 ENCOUNTER — CLINICAL SUPPORT (OUTPATIENT)
Dept: FAMILY MEDICINE CLINIC | Age: 53
End: 2018-01-31

## 2018-01-31 ENCOUNTER — HOSPITAL ENCOUNTER (OUTPATIENT)
Dept: LAB | Age: 53
Discharge: HOME | End: 2018-01-31
Payer: COMMERCIAL

## 2018-01-31 DIAGNOSIS — R59.0 MEDIASTINAL ADENOPATHY: ICD-10-CM

## 2018-01-31 DIAGNOSIS — E66.01 MORBID OBESITY (HCC): ICD-10-CM

## 2018-01-31 PROCEDURE — 86480 TB TEST CELL IMMUN MEASURE: CPT | Performed by: INTERNAL MEDICINE

## 2018-01-31 PROCEDURE — 82164 ANGIOTENSIN I ENZYME TEST: CPT | Performed by: INTERNAL MEDICINE

## 2018-01-31 RX ORDER — PHENTERMINE HYDROCHLORIDE 15 MG/1
CAPSULE ORAL
Qty: 90 CAP | Refills: 2 | Status: SHIPPED | OUTPATIENT
Start: 2018-01-31 | End: 2018-06-20 | Stop reason: SDUPTHER

## 2018-01-31 NOTE — PROGRESS NOTES
Almita Gibson is a 46 y.o. male here today for labs only. Patient tolerated well and left showing no s/s of distress.

## 2018-02-01 LAB — ACE SERPL-CCNC: 27 U/L (ref 14–82)

## 2018-02-02 ENCOUNTER — OFFICE VISIT (OUTPATIENT)
Dept: PULMONOLOGY | Age: 53
End: 2018-02-02

## 2018-02-02 VITALS
BODY MASS INDEX: 40.43 KG/M2 | HEIGHT: 74 IN | RESPIRATION RATE: 12 BRPM | TEMPERATURE: 98.1 F | SYSTOLIC BLOOD PRESSURE: 107 MMHG | WEIGHT: 315 LBS | HEART RATE: 74 BPM | DIASTOLIC BLOOD PRESSURE: 80 MMHG | OXYGEN SATURATION: 96 %

## 2018-02-02 DIAGNOSIS — E66.01 MORBID OBESITY (HCC): ICD-10-CM

## 2018-02-02 DIAGNOSIS — R59.0 MEDIASTINAL LYMPHADENOPATHY: ICD-10-CM

## 2018-02-02 DIAGNOSIS — F17.200 TOBACCO DEPENDENCE: ICD-10-CM

## 2018-02-02 DIAGNOSIS — R91.8 LUNG NODULES: Primary | ICD-10-CM

## 2018-02-02 NOTE — PROGRESS NOTES
HISTORY OF PRESENT ILLNESS  Solo Will is a 46 y.o. male. HPI Comments: Follow up for left lung nodule seen on CT abdomen/pelvis 11/22/2017 done for abdominal pain. Pt is a current active smoker who presented with abdominal pain, CT done showed a LLL partially mineralized nodule similar to one visualized Dec 6 2016 during a cardiac CT. Pt denies chest pain but has intermittent shortness of breath without wheezing, often relieved by rescue Albuterol which  He takes on average 1-2 times per month. Pt also notes ambulation is limited by bilateral knee pains. Other complaints include occasional cough with clear phlegm, no hemoptysis. Pt with chronic left flank pain with onset after a violent sneeze/cough over 2 years ago. Workup was nondiagnostic. Pt's wife notes loud snoring without witnessed apneas. He denies daytime somnolence, memory loss or nonrefreshing sleep. Outside records review show PFT's with moderate obstruction FEV1 61% in 2016. Pt denies interval changes to medical history      Review of Systems   Constitutional: Positive for malaise/fatigue. Negative for chills, diaphoresis, fever and weight loss. HENT: Negative for congestion, ear discharge, ear pain, hearing loss, nosebleeds, sinus pain, sore throat and tinnitus. Eyes: Negative for blurred vision, double vision, photophobia, pain, discharge and redness. Respiratory: Positive for cough and sputum production. Negative for hemoptysis, wheezing and stridor. Cardiovascular: Negative for chest pain, palpitations, orthopnea, claudication and PND. Leg swelling: occasional.   Gastrointestinal: Negative for blood in stool, constipation, diarrhea, heartburn, melena, nausea and vomiting. Abdominal pain: chronic. Genitourinary: Negative for dysuria, flank pain, frequency, hematuria and urgency. Musculoskeletal: Positive for back pain and joint pain. Negative for falls, myalgias and neck pain. Skin: Negative for itching and rash. Neurological: Negative for dizziness, tingling, tremors, sensory change, speech change, focal weakness, seizures, loss of consciousness, weakness and headaches. Endo/Heme/Allergies: Negative for environmental allergies and polydipsia. Does not bruise/bleed easily. Psychiatric/Behavioral: Negative for depression, hallucinations, memory loss, substance abuse and suicidal ideas. The patient is nervous/anxious. The patient does not have insomnia. Past Medical History:   Diagnosis Date    Asthma     ED (erectile dysfunction)     GERD (gastroesophageal reflux disease)     Hematuria     Hypercholesterolemia 2010    Hyperlipidemia     Hypogonadism, male     Kidney stone     Morbid obesity ([address]      Past Surgical History:   Procedure Laterality Date    HX COLONOSCOPY  12-    4 polyps removed    HX HEENT      eye surgery as a child    HX ORTHOPAEDIC Left 2003    left lateral meniscus amputated    HX UROLOGICAL Left 04/27/2017    [facility] Cysto, bilateral RPG, left URS and placement of left double J ureteral stent - Dr. Gonsalo Lott.  H[facility]OLOGICAL  05/18/2017    [facility]. Cystoscopy, left ureteroscopy, holmium laser lithotripsy and left double-J ureteral stent removal. Dr. Gonsalo Lott. Current Outpatient Prescriptions on File Prior to Visit   Medication Sig Dispense Refill    phentermine (ADIPEX_P) 15 mg capsule 2 tabs in the morning and 1 tab in early afternoon  Indications: WEIGHT LOSS MANAGEMENT FOR OBESE PATIENT (BMI >= 30) 90 Cap 2    budesonide-formoterol (SYMBICORT) 80-4.5 mcg/actuation HFAA Take 2 Puffs by inhalation two (2) times a day. 1 Inhaler 5    omeprazole (PRILOSEC) 40 mg capsule TAKE 1 CAPSULE DAILY 90 Cap 2    sildenafil, antihypertensive, (REVATIO) 20 mg tablet 2-5 tabs 60 minutes before sexual activity 90 Tab 2    rosuvastatin (CRESTOR) 20 mg tablet Take 1 Tab by mouth nightly.  90 Tab 2    naproxen (NAPROSYN) 500 mg tablet TAKE 1 TABLET BY MOUTH TWO TIMES A DAY 60 Tab 2    oxyCODONE-acetaminophen (PERCOCET) 5-325 mg per tablet Take 1-2 Tabs by mouth every six (6) hours as needed for Pain. Max Daily Amount: 8 Tabs. 60 Tab 0    topiramate (TOPAMAX) 50 mg tablet TAKE 1 TABLET TWICE A  Tab 1    methocarbamol (ROBAXIN) 750 mg tablet Take 1 Tab by mouth four (4) times daily as needed. 40 Tab 2    triamcinolone acetonide (KENALOG) 0.5 % ointment Apply  to affected area two (2) times daily as needed for Skin Irritation. use thin layer 30 g 0     No current facility-administered medications on file prior to visit. Allergies   Allergen Reactions    Vicodin [Hydrocodone-Acetaminophen] Itching     Family History   Problem Relation Age of Onset    Diabetes Mother     Stroke Mother     Arthritis-osteo Father     Hypertension Father     Arthritis-osteo Brother     Heart Attack Brother     Diabetes Maternal Grandmother      Social History     Social History    Marital status:      Spouse name: N/A    Number of children: N/A    Years of education: N/A     Occupational History          Social History Main Topics    Smoking status: Current Every Day Smoker     Packs/day: 2.00     Years: 45.00     Types: Cigarettes     Start date: 1/1/1970     Last attempt to quit: 7/13/2016    Smokeless tobacco: Never Used    Alcohol use No    Drug use: No    Sexual activity: Not Currently     Other Topics Concern    Not on file     Social History Narrative     Blood pressure 107/80, pulse 74, temperature 98.1 °F (36.7 °C), resp. rate 12, height 6' 2\" (1.88 m), weight 151 kg (333 lb), SpO2 96 %. Physical Exam   Constitutional: He is oriented to person, place, and time. He appears well-developed. No distress. Morbidly obese    HENT:   Head: Normocephalic and atraumatic. Nose: Nose normal.   Mouth/Throat: Oropharynx is clear and moist. No oropharyngeal exudate. Mallampati 2   Eyes: Conjunctivae and EOM are normal. Pupils are equal, round, and reactive to light. Right eye exhibits no discharge. Left eye exhibits no discharge. No scleral icterus. Neck: No JVD present. No tracheal deviation present. No thyromegaly present. Cardiovascular: Normal rate, regular rhythm, normal heart sounds and intact distal pulses. Exam reveals no gallop and no friction rub. No murmur heard. Pulmonary/Chest: Effort normal and breath sounds normal. No stridor. No respiratory distress. He has no wheezes. He has no rales. He exhibits no tenderness. Abdominal: Soft. Bowel sounds are normal. He exhibits no mass. Tenderness: mild left flank to deep palpation. There is no rebound. Musculoskeletal: Normal range of motion. He exhibits no edema or tenderness. Lymphadenopathy:     He has no cervical adenopathy. Neurological: He is alert and oriented to person, place, and time. Skin: Skin is warm and dry. No rash noted. He is not diaphoretic. No erythema. Psychiatric: He has a normal mood and affect. His behavior is normal. Judgment and thought content normal.     CT Results (most recent):    Results from Hospital Encounter encounter on 01/29/18   CT CHEST WO CONT   Narrative CT chest without enhancement     INDICATION: Pulmonary nodule follow-up. TECHNIQUE: 5 mm collimation axial images obtained from the thoracic inlet to the  level of the diaphragm without administration of low osmolar, nonionic  intravenous contrast.    Dose reduction techniques used: Automated exposure control, adjustment of the  mAs and/or kVp according to patient size, standardized low-dose protocol, and/or  iterative reconstruction technique. COMPARISON: CT abdomen and pelvis dated November 22, 2017 and CT heart dated  December 6, 2016. CHEST FINDINGS:     Lymph nodes: Extensive partially calcified lymph nodes measure top normal in  size and are likely sequela I of prior granulomatous disease exposure. A  precarinal lymph node measures 1.2 cm short axis.  There is no definite  suspicious adenopathy. Thyroid: Unremarkable. Mediastinum: Heart is normal in size. There is three-vessel coronary artery  disease. There is no significant pericardial effusion. There is mild scattered  atherosclerotic disease. The esophagus is underdistended. Lungs: There is a partially calcified left lower lobe nodule which measures  approximately 1.3 x 1.2 cm, not significantly changed in size since December 2016. This demonstrates surrounding nodular densities measuring up to 4 mm as  seen on axial image 44. Findings are stable. The central airways are clear. There is a granuloma in the right middle lobe which is stable. ABDOMEN:     No acute finding. Granulomas in the spleen. Bones: No acute finding. Degenerative changes of the spine. Impression Impression:    Stable left lower lobe nodule with some calcification and surrounding smaller  pulmonary nodules. The appearance is stable since December 2016. Recommend 12  month follow-up chest CT. Is is most likely sequela of prior granulomatous  disease exposure. Stable prominent, partially calcified mediastinal and hilar lymph nodes, likely  sequela of prior granulomatous disease exposure. No definite new suspicious pulmonary nodule. ASSESSMENT and PLAN  Encounter Diagnoses   Name Primary?  Lung nodules Yes    Mediastinal lymphadenopathy     Tobacco dependence     Morbid obesity ([address]      Reviewed CT with pt and wife who is present in the room. Also informed them of negative IGRA for TB. 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 As findings have been stable since Dec 2016, would repeat CT in 6 months ans follow up after. Discussed normal LV and DLCO and possible small airways involvement. Discussed smoking harm and need for quitting again and pt expressed no intention to quit. RTC after CT.   Face to face counseling took up over 50% of this 40 minute visit

## 2018-02-02 NOTE — PROGRESS NOTES
Pain Comprehensive Assessment     Are you currently in pain? headaches and level of pain (1-10, 10 severe), 10 reports a 20 so bad cant turn head    Describe your pain? Throbbing pounding, can' turn head    How frequent is your pain? every day starts 30 min after getting up    What activity (activities) aggravates your pain? recreational activities    Pain is worse during the day. What relieves your pain? Goodies powder    The above treatment(s) provides some relief.             Uses Symbicort PRN

## 2018-02-02 NOTE — MR AVS SNAPSHOT
[address] [facility], [address] [address] 
[phone] Patient: Hanh Cuello MRN: [id number] :1965 Visit Information Date & Time Provider Department Dept. Phone Encounter #  
 2018 11:45 AM MD Padmini [facility] [place] [phone] Your Appointments 2018  8:15 AM  
Follow Up with Adri Aden MD  
[facility]) Appt Note: [facility] [address] [address]  
[phone]  
  
   
 [facility] [address] [address] Upcoming Health Maintenance Date Due DTaP/Tdap/Td series (1 - Tdap) 10/9/1986 FOBT Q 1 YEAR AGE 50-75 10/9/2015 Allergies as of 2018  Review Complete On: 2018 By: Karol Suarez MD  
  
 Severity Noted Reaction Type Reactions Vicodin [Hydrocodone-acetaminophen] Medium 2015    Itching Current Immunizations  Never Reviewed No immunizations on file. Not reviewed this visit You Were Diagnosed With   
  
 Codes Comments Lung nodules    -  Primary ICD-10-CM: R91.8 ICD-9-CM: 793.19 Mediastinal lymphadenopathy     ICD-10-CM: R59.0 ICD-9-CM: 412. 6 Vitals BP Pulse Temp Resp Height(growth percentile) Weight(growth percentile) 107/80 (BP 1 Location: Left arm, BP Patient Position: Sitting) 74 98.1 °F (36.7 °C) 12 6' 2\" (1.88 m) 333 lb (151 kg) SpO2 BMI Smoking Status 96% 42.75 kg/m2 Current Every Day Smoker Vitals History BMI and BSA Data Body Mass Index Body Surface Area 42.75 kg/m 2 2.81 m 2 Preferred Pharmacy Pharmacy Name Phone [id number] [place] [phone] Your Updated Medication List  
  
   
This list is accurate as of: 18 12:52 PM.  Always use your most recent med list.  
  
  
  
  
 budesonide-formoterol 80-4.5 mcg/actuation Hfaa Commonly known as:  SYMBICORT Take 2 Puffs by inhalation two (2) times a day. methocarbamol 750 mg tablet Commonly known as:  ROBAXIN Take 1 Tab by mouth four (4) times daily as needed. naproxen 500 mg tablet Commonly known as:  NAPROSYN  
TAKE 1 TABLET BY MOUTH TWO TIMES A DAY  
  
 omeprazole 40 mg capsule Commonly known as:  PRILOSEC  
TAKE 1 CAPSULE DAILY  
  
 oxyCODONE-acetaminophen 5-325 mg per tablet Commonly known as:  PERCOCET Take 1-2 Tabs by mouth every six (6) hours as needed for Pain. Max Daily Amount: 8 Tabs. phentermine 15 mg capsule Commonly known as:  ADIPEX_P  
2 tabs in the morning and 1 tab in early afternoon  Indications: WEIGHT LOSS MANAGEMENT FOR OBESE PATIENT (BMI >= 30)  
  
 rosuvastatin 20 mg tablet Commonly known as:  CRESTOR Take 1 Tab by mouth nightly. sildenafil (antihypertensive) 20 mg tablet Commonly known as:  REVATIO  
2-5 tabs 60 minutes before sexual activity  
  
 topiramate 50 mg tablet Commonly known as:  TOPAMAX TAKE 1 TABLET TWICE A DAY  
  
 triamcinolone acetonide 0.5 % ointment Commonly known as:  KENALOG Apply  to affected area two (2) times daily as needed for Skin Irritation. use thin layer Introducing [facility]! Dear Maykel Zimmer: 
Thank you for requesting a [employer] account. Our records indicate that you already have an active [employer] account. You can access your account anytime at [email or web address] Did you know that you can access your hospital and ER discharge instructions at any time in [employer]? You can also review all of your test results from your hospital stay or ER visit. Additional Information If you have questions, please visit the Frequently Asked Questions section of the [employer] website at [email or web address] Remember, [employer]hart is NOT to be used for urgent needs. For medical emergencies, dial 911. Now available from your iPhone and Android! Please provide this summary of care documentation to your next provider. Your primary care clinician is listed as Ashlyn Duffy. If you have any questions after today's visit, please call [phone].

## 2018-02-03 LAB
ANNOTATION COMMENT IMP: ABNORMAL
M TB IFN-G CD4+ BCKGRND COR BLD-ACNC: 1.76 IU/ML
M TB IFN-G CD4+ T-CELLS BLD-ACNC: 1.81 IU/ML
M TB TUBERC IFN-G BLD QL: POSITIVE
M TB TUBERC IGNF/MITOGEN IGNF CONTROL: 7.96 IU/ML
QUANTIFERON NIL VALUE: 0.05 IU/ML
SERVICE CMNT-IMP: ABNORMAL

## 2018-02-06 ENCOUNTER — CLINICAL SUPPORT (OUTPATIENT)
Dept: FAMILY MEDICINE CLINIC | Age: 53
End: 2018-02-06

## 2018-02-06 ENCOUNTER — HOSPITAL ENCOUNTER (OUTPATIENT)
Dept: LAB | Age: 53
Discharge: HOME | End: 2018-02-06
Payer: COMMERCIAL

## 2018-02-06 DIAGNOSIS — R76.12 POSITIVE QUANTIFERON-TB GOLD TEST: ICD-10-CM

## 2018-02-06 DIAGNOSIS — E66.01 MORBID OBESITY (HCC): Primary | ICD-10-CM

## 2018-02-06 DIAGNOSIS — E78.00 PURE HYPERCHOLESTEROLEMIA: ICD-10-CM

## 2018-02-06 DIAGNOSIS — R76.12 POSITIVE QUANTIFERON-TB GOLD TEST: Primary | ICD-10-CM

## 2018-02-06 PROCEDURE — 86480 TB TEST CELL IMMUN MEASURE: CPT | Performed by: INTERNAL MEDICINE

## 2018-02-09 LAB
ANNOTATION COMMENT IMP: ABNORMAL
M TB IFN-G CD4+ BCKGRND COR BLD-ACNC: 1.19 IU/ML
M TB IFN-G CD4+ T-CELLS BLD-ACNC: 1.22 IU/ML
M TB TUBERC IFN-G BLD QL: POSITIVE
M TB TUBERC IGNF/MITOGEN IGNF CONTROL: 6.24 IU/ML
QUANTIFERON NIL VALUE: 0.03 IU/ML
SERVICE CMNT-IMP: ABNORMAL

## 2018-02-26 ENCOUNTER — TELEPHONE (OUTPATIENT)
Dept: PULMONOLOGY | Age: 53
End: 2018-02-26

## 2018-02-26 NOTE — TELEPHONE ENCOUNTER
Pts wife Lexis Amaya was calling about sputum results. She would like to know if he is supposed to be on any medication.  Please call [phone]

## 2018-03-17 DIAGNOSIS — E66.01 MORBID OBESITY DUE TO EXCESS CALORIES (HCC): ICD-10-CM

## 2018-03-17 RX ORDER — TOPIRAMATE 50 MG/1
TABLET, FILM COATED ORAL
Qty: 180 TAB | Refills: 1 | Status: SHIPPED | OUTPATIENT
Start: 2018-03-17 | End: 2018-09-05

## 2018-03-19 DIAGNOSIS — M25.562 CHRONIC PAIN OF BOTH KNEES: ICD-10-CM

## 2018-03-19 DIAGNOSIS — G89.29 CHRONIC PAIN OF BOTH KNEES: ICD-10-CM

## 2018-03-19 DIAGNOSIS — M25.50 MULTIPLE JOINT PAIN: ICD-10-CM

## 2018-03-19 DIAGNOSIS — M25.561 CHRONIC PAIN OF BOTH KNEES: ICD-10-CM

## 2018-03-19 RX ORDER — OXYCODONE AND ACETAMINOPHEN 5; 325 MG/1; MG/1
1-2 TABLET ORAL
Qty: 60 TAB | Refills: 0 | Status: SHIPPED | OUTPATIENT
Start: 2018-03-19 | End: 2018-06-20 | Stop reason: SDUPTHER

## 2018-04-02 ENCOUNTER — TELEPHONE (OUTPATIENT)
Dept: PULMONOLOGY | Age: 53
End: 2018-04-02

## 2018-04-02 NOTE — TELEPHONE ENCOUNTER
Pt's wife would like to know if we have gotten sputum results back yet from [facility].  It's been over 6wks per wife. Please call [phone].

## 2018-04-04 ENCOUNTER — OFFICE VISIT (OUTPATIENT)
Dept: FAMILY MEDICINE CLINIC | Age: 53
End: 2018-04-04

## 2018-04-04 ENCOUNTER — HOSPITAL ENCOUNTER (OUTPATIENT)
Dept: LAB | Age: 53
Discharge: HOME | End: 2018-04-04
Payer: COMMERCIAL

## 2018-04-04 VITALS
HEIGHT: 74 IN | RESPIRATION RATE: 18 BRPM | WEIGHT: 315 LBS | TEMPERATURE: 97.6 F | BODY MASS INDEX: 40.43 KG/M2 | SYSTOLIC BLOOD PRESSURE: 126 MMHG | OXYGEN SATURATION: 96 % | DIASTOLIC BLOOD PRESSURE: 75 MMHG | HEART RATE: 69 BPM

## 2018-04-04 DIAGNOSIS — E66.01 MORBID OBESITY (HCC): ICD-10-CM

## 2018-04-04 DIAGNOSIS — J06.9 UPPER RESPIRATORY TRACT INFECTION, UNSPECIFIED TYPE: ICD-10-CM

## 2018-04-04 DIAGNOSIS — E66.01 MORBID OBESITY (HCC): Primary | ICD-10-CM

## 2018-04-04 DIAGNOSIS — J44.9 CHRONIC OBSTRUCTIVE PULMONARY DISEASE, UNSPECIFIED COPD TYPE (HCC): ICD-10-CM

## 2018-04-04 LAB
ALBUMIN SERPL-MCNC: 4.2 G/DL (ref 3.4–5)
ALBUMIN/GLOB SERPL: 1.2 {RATIO} (ref 0.8–1.7)
ALP SERPL-CCNC: 84 U/L (ref 45–117)
ALT SERPL-CCNC: 52 U/L (ref 16–61)
ANION GAP SERPL CALC-SCNC: 11 MMOL/L (ref 3–18)
AST SERPL-CCNC: 24 U/L (ref 15–37)
BILIRUB SERPL-MCNC: 0.6 MG/DL (ref 0.2–1)
BUN SERPL-MCNC: 18 MG/DL (ref 7–18)
BUN/CREAT SERPL: 19 (ref 12–20)
CALCIUM SERPL-MCNC: 9.1 MG/DL (ref 8.5–10.1)
CHLORIDE SERPL-SCNC: 107 MMOL/L (ref 100–108)
CHOLEST SERPL-MCNC: 188 MG/DL
CO2 SERPL-SCNC: 21 MMOL/L (ref 21–32)
CREAT SERPL-MCNC: 0.93 MG/DL (ref 0.6–1.3)
GLOBULIN SER CALC-MCNC: 3.6 G/DL (ref 2–4)
GLUCOSE SERPL-MCNC: 107 MG/DL (ref 74–99)
HDLC SERPL-MCNC: 37 MG/DL (ref 40–60)
HDLC SERPL: 5.1 {RATIO} (ref 0–5)
LDLC SERPL CALC-MCNC: 102.2 MG/DL (ref 0–100)
LIPID PROFILE,FLP: ABNORMAL
POTASSIUM SERPL-SCNC: 4.6 MMOL/L (ref 3.5–5.5)
PROT SERPL-MCNC: 7.8 G/DL (ref 6.4–8.2)
SODIUM SERPL-SCNC: 139 MMOL/L (ref 136–145)
TRIGL SERPL-MCNC: 244 MG/DL (ref ?–150)
VLDLC SERPL CALC-MCNC: 48.8 MG/DL

## 2018-04-04 PROCEDURE — 80053 COMPREHEN METABOLIC PANEL: CPT | Performed by: FAMILY MEDICINE

## 2018-04-04 PROCEDURE — 80061 LIPID PANEL: CPT | Performed by: FAMILY MEDICINE

## 2018-04-04 RX ORDER — OXYCODONE AND ACETAMINOPHEN 5; 325 MG/1; MG/1
1-2 TABLET ORAL
Qty: 60 TAB | Refills: 0 | Status: CANCELLED | OUTPATIENT
Start: 2018-04-04

## 2018-04-04 NOTE — PROGRESS NOTES
Chief Complaint   Patient presents with    COPD     follow up    Obesity     follow up       1. Have you been to the ER, urgent care clinic since your last visit? Hospitalized since your last visit? No    2. Have you seen or consulted any other health care providers outside of the [address] [place] [address] since your last visit? Include any pap smears or colon screening.  Yes Plumonary and urologist

## 2018-04-04 NOTE — PROGRESS NOTES
COPD (follow up) and Obesity (follow up)        HPI: Bonni Rinne is a 46 y.o. male WHITE OR   Here in follow up of his COPD and Obesity. He has been using his phentermine and topamax as prescribed. He does not feel that they are working like they used to. He has seen Dr Wendy Cooper for his COPD and workup on lung nodule. Chart note reviewed. He has had negative IGRA and negative sputum culture per review of record. He feels that breathing is doing ok. Using inhalers as prescribed. He continues to smoke with no intention of quitting. Past Medical History:   Diagnosis Date    Asthma     ED (erectile dysfunction)     GERD (gastroesophageal reflux disease)     Hematuria     Hypercholesterolemia 2010    Hyperlipidemia     Hypogonadism, male     Kidney stone     Morbid obesity (HCC)        Current Outpatient Prescriptions   Medication Sig    oxyCODONE-acetaminophen (PERCOCET) 5-325 mg per tablet Take 1-2 Tabs by mouth every six (6) hours as needed for Pain. Max Daily Amount: 8 Tabs.  topiramate (TOPAMAX) 50 mg tablet TAKE 1 TABLET TWICE A DAY    phentermine (ADIPEX_P) 15 mg capsule 2 tabs in the morning and 1 tab in early afternoon  Indications: WEIGHT LOSS MANAGEMENT FOR OBESE PATIENT (BMI >= 30)    budesonide-formoterol (SYMBICORT) 80-4.5 mcg/actuation HFAA Take 2 Puffs by inhalation two (2) times a day.  omeprazole (PRILOSEC) 40 mg capsule TAKE 1 CAPSULE DAILY    sildenafil, antihypertensive, (REVATIO) 20 mg tablet 2-5 tabs 60 minutes before sexual activity    rosuvastatin (CRESTOR) 20 mg tablet Take 1 Tab by mouth nightly.  naproxen (NAPROSYN) 500 mg tablet TAKE 1 TABLET BY MOUTH TWO TIMES A DAY    methocarbamol (ROBAXIN) 750 mg tablet Take 1 Tab by mouth four (4) times daily as needed.  triamcinolone acetonide (KENALOG) 0.5 % ointment Apply  to affected area two (2) times daily as needed for Skin Irritation.  use thin layer     No current facility-administered medications for this visit. Allergies   Allergen Reactions    Vicodin [Hydrocodone-Acetaminophen] Itching       Past Surgical History:   Procedure Laterality Date    HX COLONOSCOPY  12-    4 polyps removed    HX HEENT      eye surgery as a child    HX ORTHOPAEDIC Left 2003    left lateral meniscus amputated    HX UROLOGICAL Left 04/27/2017    [facility]. Cysto, bilateral RPG, left URS and placement of left double J ureteral stent - Dr. Nicci Ledesma.  HX UROLOGICAL  05/18/2017    [facility]. Cystoscopy, left ureteroscopy, holmium laser lithotripsy and left double-J ureteral stent removal. Dr. Nicci Ledesma. Family History   Problem Relation Age of Onset    Diabetes Mother     Stroke Mother     Arthritis-osteo Father     Hypertension Father     Arthritis-osteo Brother     Heart Attack Brother     Diabetes Maternal Grandmother        Social History     Social History    Marital status:      Spouse name: N/A    Number of children: N/A    Years of education: N/A     Occupational History          Social History Main Topics    Smoking status: Current Every Day Smoker     Packs/day: 2.00     Years: 45.00     Types: Cigarettes     Start date: 1/1/1970     Last attempt to quit: 7/13/2016    Smokeless tobacco: Never Used    Alcohol use No    Drug use: No    Sexual activity: Not Currently     Other Topics Concern    Not on file     Social History Narrative       Gen- No weight loss, No Malaise, No fatigue  Eyes- No dipoplia, blurry vision  [facility]- No Chest pain, no palpitations, no edema  Neuro- No headaches  Skin- No easy bruising, No rashes      Visit Vitals    /75 (BP 1 Location: Right arm, BP Patient Position: Sitting)    Pulse 69    Temp 97.6 °F (36.4 °C) (Oral)    Resp 18    Ht 6' 2\" (1.88 m)    Wt 344 lb (156 kg)    SpO2 96%  Comment: room air    BMI 44.17 kg/m2       Physical Exam   Constitutional: He appears well-developed and well-nourished. No distress.    HENT:   Head: Normocephalic and atraumatic. Right Ear: Hearing, tympanic membrane, external ear and ear canal normal.   Left Ear: Hearing, tympanic membrane, external ear and ear canal normal.   Nose: Mucosal edema and rhinorrhea present. Mouth/Throat: No oropharyngeal exudate, posterior oropharyngeal edema or posterior oropharyngeal erythema. Neck: Neck supple. Cardiovascular: Normal rate, regular rhythm and normal heart sounds. Pulmonary/Chest: Effort normal and breath sounds normal. No respiratory distress. He has no wheezes. He has no rales. Abdominal: Soft. Lymphadenopathy:     He has no cervical adenopathy. Skin: Skin is warm and dry. He is not diaphoretic. Nursing note and vitals reviewed. Assesment:    ICD-10-CM ICD-9-CM    1. Morbid obesity ([facility] R04.09 184.21 METABOLIC PANEL, COMPREHENSIVE      LIPID PANEL   2. Chronic obstructive pulmonary disease, unspecified COPD type ([facility] J44.9 496    3. Upper respiratory tract infection, unspecified type J06.9 465.9      Coupon card for J[employer]RVENPÄÄ provided. They will check with insurance on coverage. Continue phentermine/ topiramate for now. Continue inhalers as prescribed. Repeat CT and follow up with Dr Uzair Watson as recommended    Increase fluid intake for cold. Vitamin C advised      I have discussed the diagnosis with the patient and the intended management  The patient has received an after-visit summary and questions were answered concerning future plans. I have discussed medication usage, side effects and warnings with the patient as well. I have reviewed the plan of care with the patient, accepted their input and they are in agreement with the treatment goals. Follow-up Disposition:  Return in about 6 months (around 10/4/2018).       Jany Kruger MD                .

## 2018-04-04 NOTE — MR AVS SNAPSHOT
[address] [place] [address] [address] [address] 
[phone] Patient: Vadim Cagle MRN: [id number] :1965 Visit Information Date & Time Provider Department Dept. Phone Encounter #  
 2018  8:15 AM Layla Downey, [address] [phone] [id number] Follow-up Instructions Return in about 6 months (around 10/4/2018). Upcoming Health Maintenance Date Due DTaP/Tdap/Td series (1 - Tdap) 10/9/1986 FOBT Q 1 YEAR AGE 50-75 10/9/2015 Allergies as of 2018  Review Complete On: 2018 By: Deborah Miller Severity Noted Reaction Type Reactions Vicodin [Hydrocodone-acetaminophen] Medium 2015    Itching Current Immunizations  Never Reviewed No immunizations on file. Not reviewed this visit You Were Diagnosed With   
  
 Codes Comments Chronic obstructive pulmonary disease, unspecified COPD type ([facility]    -  Primary ICD-10-CM: J44.9 ICD-9-CM: 121 Morbid obesity ([facility]     ICD-10-CM: E66.01 
ICD-9-CM: 278.01 Vitals BP Pulse Temp Resp Height(growth percentile) Weight(growth percentile) 126/75 (BP 1 Location: Right arm, BP Patient Position: Sitting) 69 97.6 °F (36.4 °C) (Oral) 18 6' 2\" (1.88 m) 344 lb (156 kg) SpO2 BMI Smoking Status 96% 44.17 kg/m2 Current Every Day Smoker Vitals History BMI and BSA Data Body Mass Index Body Surface Area  
 44.17 kg/m 2 2.85 m 2 Preferred Pharmacy Pharmacy Name Phone [id number], [address] [phone] Your Updated Medication List  
  
   
This list is accurate as of 18  9:11 AM.  Always use your most recent med list.  
  
  
  
  
 budesonide-formoterol 80-4.5 mcg/actuation Hfaa Commonly known as:  SYMBICORT Take 2 Puffs by inhalation two (2) times a day. methocarbamol 750 mg tablet Commonly known as:  ROBAXIN Take 1 Tab by mouth four (4) times daily as needed. naproxen 500 mg tablet Commonly known as:  NAPROSYN  
TAKE 1 TABLET BY MOUTH TWO TIMES A DAY  
  
 omeprazole 40 mg capsule Commonly known as:  PRILOSEC  
TAKE 1 CAPSULE DAILY  
  
 oxyCODONE-acetaminophen 5-325 mg per tablet Commonly known as:  PERCOCET Take 1-2 Tabs by mouth every six (6) hours as needed for Pain. Max Daily Amount: 8 Tabs. phentermine 15 mg capsule Commonly known as:  ADIPEX_P  
2 tabs in the morning and 1 tab in early afternoon  Indications: WEIGHT LOSS MANAGEMENT FOR OBESE PATIENT (BMI >= 30)  
  
 rosuvastatin 20 mg tablet Commonly known as:  CRESTOR Take 1 Tab by mouth nightly. sildenafil (antihypertensive) 20 mg tablet Commonly known as:  REVATIO  
2-5 tabs 60 minutes before sexual activity  
  
 topiramate 50 mg tablet Commonly known as:  TOPAMAX TAKE 1 TABLET TWICE A DAY  
  
 triamcinolone acetonide 0.5 % ointment Commonly known as:  KENALOG Apply  to affected area two (2) times daily as needed for Skin Irritation. use thin layer Follow-up Instructions Return in about 6 months (around 10/4/2018). To-Do List   
 04/04/2018 Lab:  LIPID PANEL   
  
 04/04/2018 Lab:  METABOLIC PANEL, COMPREHENSIVE Patient Instructions Body Mass Index: Care Instructions Your Care Instructions Body mass index (BMI) can help you see if your weight is raising your risk for health problems. It uses a formula to compare how much you weigh with how tall you are. · A BMI lower than 18.5 is considered underweight. · A BMI between 18.5 and 24.9 is considered healthy. · A BMI between 25 and 29.9 is considered overweight. A BMI of 30 or higher is considered obese. If your BMI is in the normal range, it means that you have a lower risk for weight-related health problems.  If your BMI is in the overweight or obese range, you may be at increased risk for weight-related health problems, such as high blood pressure, heart disease, stroke, arthritis or joint pain, and diabetes. If your BMI is in the underweight range, you may be at increased risk for health problems such as fatigue, lower protection (immunity) against illness, muscle loss, bone loss, hair loss, and hormone problems. BMI is just one measure of your risk for weight-related health problems. You may be at higher risk for health problems if you are not active, you eat an unhealthy diet, or you drink too much alcohol or use tobacco products. Follow-up care is a key part of your treatment and safety. Be sure to make and go to all appointments, and call your doctor if you are having problems. It's also a good idea to know your test results and keep a list of the medicines you take. How can you care for yourself at home? · Practice healthy eating habits. This includes eating plenty of fruits, vegetables, whole grains, lean protein, and low-fat dairy. · If your doctor recommends it, get more exercise. Walking is a good choice. Bit by bit, increase the amount you walk every day. Try for at least 30 minutes on most days of the week. · Do not smoke. Smoking can increase your risk for health problems. If you need help quitting, talk to your doctor about stop-smoking programs and medicines. These can increase your chances of quitting for good. · Limit alcohol to 2 drinks a day for men and 1 drink a day for women. Too much alcohol can cause health problems. If you have a BMI higher than 25 · Your doctor may do other tests to check your risk for weight-related health problems. This may include measuring the distance around your waist. A waist measurement of more than 40 inches in men or 35 inches in women can increase the risk of weight-related health problems.  
· Talk with your doctor about steps you can take to stay healthy or improve your health. You may need to make lifestyle changes to lose weight and stay healthy, such as changing your diet and getting regular exercise. If you have a BMI lower than 18.5 · Your doctor may do other tests to check your risk for health problems. · Talk with your doctor about steps you can take to stay healthy or improve your health. You may need to make lifestyle changes to gain or maintain weight and stay healthy, such as getting more healthy foods in your diet and doing exercises to build muscle. Where can you learn more? Go to http://nohelia-martina.info/. Enter S176 in the search box to learn more about \"Body Mass Index: Care Instructions. \" Current as of: October 13, 2016 Content Version: 11.4 © 6056-9934 [employer]. Care instructions adapted under license by [employer] (which disclaims liability or warranty for this information). If you have questions about a medical condition or this instruction, always ask your healthcare professional. [place] [address] any warranty or liability for your use of this information. Introducing \[facility] [place] [facility]\"" & HEALTH SERVICES! Dear Ricardo Zaidi: 
Thank you for requesting a [employer] account. Our records indicate that you already have an active [employer] account. You can access your account anytime at [email or web address] Did you know that you can access your hospital and ER discharge instructions at any time in [employer]? You can also review all of your test results from your hospital stay or ER visit. Additional Information If you have questions, please visit the Frequently Asked Questions section of the [employer] website at [email or web address] Remember, [employer] is NOT to be used for urgent needs. For medical emergencies, dial 911. Now available from your iPhone and Android! Please provide this summary of care documentation to your next provider. Your primary care clinician is listed as Gina Handing. If you have any questions after today's visit, please call [phone].

## 2018-04-04 NOTE — PATIENT INSTRUCTIONS
Body Mass Index: Care Instructions  Your Care Instructions    Body mass index (BMI) can help you see if your weight is raising your risk for health problems. It uses a formula to compare how much you weigh with how tall you are. · A BMI lower than 18.5 is considered underweight. · A BMI between 18.5 and 24.9 is considered healthy. · A BMI between 25 and 29.9 is considered overweight. A BMI of 30 or higher is considered obese. If your BMI is in the normal range, it means that you have a lower risk for weight-related health problems. If your BMI is in the overweight or obese range, you may be at increased risk for weight-related health problems, such as high blood pressure, heart disease, stroke, arthritis or joint pain, and diabetes. If your BMI is in the underweight range, you may be at increased risk for health problems such as fatigue, lower protection (immunity) against illness, muscle loss, bone loss, hair loss, and hormone problems. BMI is just one measure of your risk for weight-related health problems. You may be at higher risk for health problems if you are not active, you eat an unhealthy diet, or you drink too much alcohol or use tobacco products. Follow-up care is a key part of your treatment and safety. Be sure to make and go to all appointments, and call your doctor if you are having problems. It's also a good idea to know your test results and keep a list of the medicines you take. How can you care for yourself at home? · Practice healthy eating habits. This includes eating plenty of fruits, vegetables, whole grains, lean protein, and low-fat dairy. · If your doctor recommends it, get more exercise. Walking is a good choice. Bit by bit, increase the amount you walk every day. Try for at least 30 minutes on most days of the week. · Do not smoke. Smoking can increase your risk for health problems. If you need help quitting, talk to your doctor about stop-smoking programs and medicines. These can increase your chances of quitting for good. · Limit alcohol to 2 drinks a day for men and 1 drink a day for women. Too much alcohol can cause health problems. If you have a BMI higher than 25  · Your doctor may do other tests to check your risk for weight-related health problems. This may include measuring the distance around your waist. A waist measurement of more than 40 inches in men or 35 inches in women can increase the risk of weight-related health problems. · Talk with your doctor about steps you can take to stay healthy or improve your health. You may need to make lifestyle changes to lose weight and stay healthy, such as changing your diet and getting regular exercise. If you have a BMI lower than 18.5  · Your doctor may do other tests to check your risk for health problems. · Talk with your doctor about steps you can take to stay healthy or improve your health. You may need to make lifestyle changes to gain or maintain weight and stay healthy, such as getting more healthy foods in your diet and doing exercises to build muscle. Where can you learn more? Go to http://nohelia-martina.info/. Enter S176 in the search box to learn more about \"Body Mass Index: Care Instructions. \"  Current as of: October 13, 2016  Content Version: 11.4  © 9051-0940 [employer]. Care instructions adapted under license by [employer] (which disclaims liability or warranty for this information). If you have questions about a medical condition or this instruction, always ask your healthcare professional. [address] any warranty or liability for your use of this information.

## 2018-04-12 LAB
RESULT: NORMAL
RESULT: NORMAL

## 2018-06-20 DIAGNOSIS — M25.561 CHRONIC PAIN OF BOTH KNEES: ICD-10-CM

## 2018-06-20 DIAGNOSIS — K21.9 GASTROESOPHAGEAL REFLUX DISEASE WITHOUT ESOPHAGITIS: ICD-10-CM

## 2018-06-20 DIAGNOSIS — M62.830 SPASM OF BACK MUSCLES: ICD-10-CM

## 2018-06-20 DIAGNOSIS — G89.29 CHRONIC PAIN OF BOTH KNEES: ICD-10-CM

## 2018-06-20 DIAGNOSIS — M25.562 CHRONIC PAIN OF BOTH KNEES: ICD-10-CM

## 2018-06-20 DIAGNOSIS — E66.01 MORBID OBESITY (HCC): ICD-10-CM

## 2018-06-20 DIAGNOSIS — M25.50 MULTIPLE JOINT PAIN: ICD-10-CM

## 2018-06-20 RX ORDER — METHOCARBAMOL 750 MG/1
750 TABLET, FILM COATED ORAL
Qty: 40 TAB | Refills: 2 | Status: SHIPPED | OUTPATIENT
Start: 2018-06-20 | End: 2018-06-27

## 2018-06-20 RX ORDER — OXYCODONE AND ACETAMINOPHEN 5; 325 MG/1; MG/1
1-2 TABLET ORAL
Qty: 60 TAB | Refills: 0 | Status: SHIPPED | OUTPATIENT
Start: 2018-06-20 | End: 2018-06-27

## 2018-06-20 RX ORDER — PHENTERMINE HYDROCHLORIDE 15 MG/1
CAPSULE ORAL
Qty: 90 CAP | Refills: 2 | Status: SHIPPED | OUTPATIENT
Start: 2018-06-20 | End: 2018-06-27

## 2018-06-27 ENCOUNTER — OFFICE VISIT (OUTPATIENT)
Dept: FAMILY MEDICINE CLINIC | Age: 53
End: 2018-06-27

## 2018-06-27 ENCOUNTER — TELEPHONE (OUTPATIENT)
Dept: FAMILY MEDICINE CLINIC | Age: 53
End: 2018-06-27

## 2018-06-27 VITALS
HEART RATE: 72 BPM | OXYGEN SATURATION: 96 % | TEMPERATURE: 97.9 F | HEIGHT: 74 IN | DIASTOLIC BLOOD PRESSURE: 75 MMHG | RESPIRATION RATE: 18 BRPM | BODY MASS INDEX: 40.43 KG/M2 | SYSTOLIC BLOOD PRESSURE: 127 MMHG | WEIGHT: 315 LBS

## 2018-06-27 DIAGNOSIS — G47.9 SLEEP DISTURBANCE: ICD-10-CM

## 2018-06-27 DIAGNOSIS — Z00.00 ANNUAL PHYSICAL EXAM: Primary | ICD-10-CM

## 2018-06-27 DIAGNOSIS — E66.01 MORBID OBESITY (HCC): ICD-10-CM

## 2018-06-27 DIAGNOSIS — M54.50 BILATERAL LOW BACK PAIN WITHOUT SCIATICA, UNSPECIFIED CHRONICITY: ICD-10-CM

## 2018-06-27 RX ORDER — METAXALONE 800 MG/1
800 TABLET ORAL 3 TIMES DAILY
Qty: 90 TAB | Refills: 0 | Status: SHIPPED | OUTPATIENT
Start: 2018-06-27 | End: 2018-07-23 | Stop reason: SDUPTHER

## 2018-06-27 NOTE — TELEPHONE ENCOUNTER
Prior Auth sent for medication and approved. Notified Pharmacy and patient spouse that it was approved.

## 2018-06-27 NOTE — PROGRESS NOTES
Loida Santos is a 46 y.o. male here today for a KATARZYNA Gandara assessment previously completed. 1. Have you been to the ER, urgent care clinic or hospitalized since your last visit? no    2. Have you seen or consulted any other health care providers outside of the [address] [place] [address] since your last visit (Include any pap smears or colon screening)? no    Do you have an Advanced Directive? yes    Would you like information on Advanced Directives?  no

## 2018-06-27 NOTE — PROGRESS NOTES
Annual Wellness Visit        HPI: Blane Perry is a 46 y.o. male WHITE [place]   Here for annual exam. Reports difficulty maintaining sleep. Wakes up every 1.5-2 hours. Snores at night. Has been recommended to undergo sleep study but he has been unwilling to do so. Feels stressed from busy work schedule. Reports phentermine/topiramate combination is no longer working in suppressing his appetite. He has gained 6# since last visit in April. Denies changes in diet or exercise    Has used robaxin with minimial improvement in his chronic recurrent back pain. He has also used his wife's Flexeril and although this does work a bit better than the robaxin he does not feel that it is very effective. He has had to use his percocet a bit more often due to the back pain that has flared up over the last few years. He uses this pain medication for chronic knee and right shoulder pain as well. Past Medical History:   Diagnosis Date    Asthma     ED (erectile dysfunction)     GERD (gastroesophageal reflux disease)     Hematuria     Hypercholesterolemia 2010    Hyperlipidemia     Hypogonadism, male     Kidney stone     Morbid obesity (HCC)        Current Outpatient Prescriptions   Medication Sig    phentermine (ADIPEX_P) 15 mg capsule 2 tabs in the morning and 1 tab in early afternoon  Indications: WEIGHT LOSS MANAGEMENT FOR OBESE PATIENT (BMI >= 30)    oxyCODONE-acetaminophen (PERCOCET) 5-325 mg per tablet Take 1-2 Tabs by mouth every six (6) hours as needed for Pain. Max Daily Amount: 8 Tabs.  methocarbamol (ROBAXIN) 750 mg tablet Take 1 Tab by mouth four (4) times daily as needed.  topiramate (TOPAMAX) 50 mg tablet TAKE 1 TABLET TWICE A DAY    budesonide-formoterol (SYMBICORT) 80-4.5 mcg/actuation HFAA Take 2 Puffs by inhalation two (2) times a day.     omeprazole (PRILOSEC) 40 mg capsule TAKE 1 CAPSULE DAILY    sildenafil, antihypertensive, (REVATIO) 20 mg tablet 2-5 tabs 60 minutes before sexual activity    rosuvastatin (CRESTOR) 20 mg tablet Take 1 Tab by mouth nightly.  triamcinolone acetonide (KENALOG) 0.5 % ointment Apply  to affected area two (2) times daily as needed for Skin Irritation. use thin layer    naproxen (NAPROSYN) 500 mg tablet TAKE 1 TABLET BY MOUTH TWO TIMES A DAY     No current facility-administered medications for this visit. Allergies   Allergen Reactions    Vicodin [Hydrocodone-Acetaminophen] Itching       Past Surgical History:   Procedure Laterality Date    HX COLONOSCOPY  12-    4 polyps removed    HX HEENT      eye surgery as a child    HX ORTHOPAEDIC Left 2003    left lateral meniscus amputated    HX UROLOGICAL Left 04/27/2017    Didierstephanie Cooper 12. Cysto, bilateral RPG, left URS and placement of left double J ureteral stent - Dr. Kaley Roger.  HX UROLOGICAL  05/18/2017    [facility]. Cystoscopy, left ureteroscopy, holmium laser lithotripsy and left double-J ureteral stent removal. Dr. Kaley Roger. Family History   Problem Relation Age of Onset    Diabetes Mother     Stroke Mother     Arthritis-osteo Father     Hypertension Father     Arthritis-osteo Brother     Heart Attack Brother     Diabetes Maternal Grandmother        Social History     Social History    Marital status:      Spouse name: N/A    Number of children: N/A    Years of education: N/A     Occupational History          Social History Main Topics    Smoking status: Current Every Day Smoker     Packs/day: 2.00     Years: 45.00     Types: Cigarettes     Start date: 1/1/1970     Last attempt to quit: 7/13/2016    Smokeless tobacco: Never Used    Alcohol use No    Drug use: No    Sexual activity: Not Currently     Other Topics Concern    Not on file     Social History Narrative       Review of Systems   Constitutional: Negative for chills, fever and weight loss. HENT: Positive for tinnitus. Negative for ear pain and sore throat. Eyes: Negative for blurred vision. Respiratory: Negative for cough and shortness of breath. Cardiovascular: Negative for chest pain, palpitations and claudication. Gastrointestinal: Negative for abdominal pain, blood in stool, heartburn, melena and nausea. Genitourinary: Negative for dysuria, frequency, hematuria and urgency. Musculoskeletal: Positive for joint pain. Negative for myalgias. Skin: Negative for itching and rash. Neurological: Negative for dizziness, seizures and headaches. Endo/Heme/Allergies: Negative for environmental allergies and polydipsia. Psychiatric/Behavioral: Negative for depression, substance abuse and suicidal ideas. Visit Vitals    /75 (BP 1 Location: Right arm, BP Patient Position: Sitting)    Pulse 72    Temp 97.9 °F (36.6 °C) (Oral)    Resp 18    Ht 6' 2\" (1.88 m)    Wt 350 lb (158.8 kg)    SpO2 96%    BMI 44.94 kg/m2       Physical Exam   Constitutional: He is oriented to person, place, and time. He appears well-developed and well-nourished. Obese     HENT:   Head: Normocephalic and atraumatic. Right Ear: Hearing, tympanic membrane, external ear and ear canal normal.   Left Ear: Hearing, tympanic membrane, external ear and ear canal normal.   Eyes: Conjunctivae and EOM are normal. Pupils are equal, round, and reactive to light. Neck: Trachea normal, normal range of motion and full passive range of motion without pain. Neck supple. No JVD present. Carotid bruit is not present. No thyroid mass and no thyromegaly present. Cardiovascular: Normal rate, regular rhythm and normal heart sounds. Pulmonary/Chest: Effort normal and breath sounds normal. No respiratory distress. He has no wheezes. He has no rales. Abdominal: Soft. Bowel sounds are normal. He exhibits no distension and no mass. There is no hepatomegaly. There is no tenderness. There is no rebound and no guarding. Musculoskeletal: Normal range of motion. He exhibits no edema.         Lumbar back: He exhibits no tenderness, no bony tenderness, no swelling and no deformity. Neurological: He is alert and oriented to person, place, and time. He has normal strength and normal reflexes. No cranial nerve deficit. Gait normal.   Reflex Scores:       Patellar reflexes are 2+ on the right side and 2+ on the left side. Achilles reflexes are 2+ on the right side and 2+ on the left side. Skin: Skin is warm and dry. Complete skin exam excluding genitalia- negative for lesions concerning malignancy    Numerous skin tags present along neck   Psychiatric: He has a normal mood and affect. Nursing note and vitals reviewed. Assesment:    ICD-10-CM ICD-9-CM    1. Annual physical exam Z00.00 V70.0    2. Sleep disturbance G47.9 780.50    3. Morbid obesity (HCC) E66.01 278.01 naltrexone-buPROPion (CONTRAVE) 8-90 mg TbER ER tablet   4. Bilateral low back pain without sciatica, unspecified chronicity M54.5 724.2 metaxalone (SKELAXIN) 800 mg tablet     Suggested follow up with sleep medicine as I strongly suspect the patient has TRE. Taper off of Topamax per instructions in PI and stop phentermine. Start contrave the following day. Advised that this medication will block the action of percocet. He is ok with this trade off if it helps him to lose weight    Use skelaxin as needed for muscle spasm/pain          I have discussed the diagnosis with the patient and the intended management  The patient has received an after-visit summary and questions were answered concerning future plans. I have discussed medication usage, side effects and warnings with the patient as well. I have reviewed the plan of care with the patient, accepted their input and they are in agreement with the treatment goals. Follow-up Disposition:  Return in about 2 months (around 8/27/2018) for Morbid obesity.       Reji Dwyer MD                .

## 2018-06-27 NOTE — PATIENT INSTRUCTIONS
For the next week use 1/2 pill of topamax, then stop completely. Start contrave the following day after completion of taper    Stop phentermine. Use Skelaxin for muscle relaxer. Consider seeing a sleep doctor. Back Pain: Care Instructions  Your Care Instructions    Back pain has many possible causes. It is often related to problems with muscles and ligaments of the back. It may also be related to problems with the nerves, discs, or bones of the back. Moving, lifting, standing, sitting, or sleeping in an awkward way can strain the back. Sometimes you don't notice the injury until later. Arthritis is another common cause of back pain. Although it may hurt a lot, back pain usually improves on its own within several weeks. Most people recover in 12 weeks or less. Using good home treatment and being careful not to stress your back can help you feel better sooner. Follow-up care is a key part of your treatment and safety. Be sure to make and go to all appointments, and call your doctor if you are having problems. It's also a good idea to know your test results and keep a list of the medicines you take. How can you care for yourself at home? · Sit or lie in positions that are most comfortable and reduce your pain. Try one of these positions when you lie down:  ¨ Lie on your back with your knees bent and supported by large pillows. ¨ Lie on the floor with your legs on the seat of a sofa or chair. Marleen Mak on your side with your knees and hips bent and a pillow between your legs. ¨ Lie on your stomach if it does not make pain worse. · Do not sit up in bed, and avoid soft couches and twisted positions. Bed rest can help relieve pain at first, but it delays healing. Avoid bed rest after the first day of back pain. · Change positions every 30 minutes. If you must sit for long periods of time, take breaks from sitting. Get up and walk around, or lie in a comfortable position.   · Try using a heating pad on a low or medium setting for 15 to 20 minutes every 2 or 3 hours. Try a warm shower in place of one session with the heating pad. · You can also try an ice pack for 10 to 15 minutes every 2 to 3 hours. Put a thin cloth between the ice pack and your skin. · Take pain medicines exactly as directed. ¨ If the doctor gave you a prescription medicine for pain, take it as prescribed. ¨ If you are not taking a prescription pain medicine, ask your doctor if you can take an over-the-counter medicine. · Take short walks several times a day. You can start with 5 to 10 minutes, 3 or 4 times a day, and work up to longer walks. Walk on level surfaces and avoid hills and stairs until your back is better. · Return to work and other activities as soon as you can. Continued rest without activity is usually not good for your back. · To prevent future back pain, do exercises to stretch and strengthen your back and stomach. Learn how to use good posture, safe lifting techniques, and proper body mechanics. When should you call for help? Call your doctor now or seek immediate medical care if:  ? · You have new or worsening numbness in your legs. ? · You have new or worsening weakness in your legs. (This could make it hard to stand up.)   ? · You lose control of your bladder or bowels. ? Watch closely for changes in your health, and be sure to contact your doctor if:  ? · Your pain gets worse. ? · You are not getting better after 2 weeks. Where can you learn more? Go to http://nohelia-martina.info/. Enter Y469 in the search box to learn more about \"Back Pain: Care Instructions. \"  Current as of: March 21, 2017  Content Version: 11.4  © 4094-9993 [employer]. Care instructions adapted under license by [employer] (which disclaims liability or warranty for this information).  If you have questions about a medical condition or this instruction, always ask your healthcare professional. [employer] disclaims any warranty or liability for your use of this information.

## 2018-06-27 NOTE — TELEPHONE ENCOUNTER
Pt's wife called to as muscle relaxer medication requires insurance approval and is asking if it's possible to have this pushed through quicker.

## 2018-06-27 NOTE — MR AVS SNAPSHOT
[address] [facility] [address] [place] [address] 
[phone] Patient: Dara Momin MRN: [id number] :1965 Visit Information Date & Time Provider Department Dept. Phone Encounter #  
 2018  8:00 AM Jesus Locke, [address] [phone] [id number] Follow-up Instructions Return in about 2 months (around 2018) for Morbid obesity. Upcoming Health Maintenance Date Due DTaP/Tdap/Td series (1 - Tdap) 10/9/1986 FOBT Q 1 YEAR AGE 50-75 10/9/2015 Influenza Age 5 to Adult 2018 Allergies as of 2018  Review Complete On: 2018 By: Constance Drummond LPN Severity Noted Reaction Type Reactions Vicodin [Hydrocodone-acetaminophen] Medium 2015    Itching Current Immunizations  Never Reviewed No immunizations on file. Not reviewed this visit You Were Diagnosed With   
  
 Codes Comments Annual physical exam    -  Primary ICD-10-CM: Z00.00 ICD-9-CM: V70.0 Sleep disturbance     ICD-10-CM: G47.9 ICD-9-CM: 780.50 Morbid obesity ([facility] [address]     ICD-10-CM: E66.01 
ICD-9-CM: 278.01 Bilateral low back pain without sciatica, unspecified chronicity     ICD-10-CM: M54.5 ICD-9-CM: 724.2 Vitals BP Pulse Temp Resp Height(growth percentile) Weight(growth percentile) 127/75 (BP 1 Location: Right arm, BP Patient Position: Sitting) 72 97.9 °F (36.6 °C) (Oral) 18 6' 2\" (1.88 m) 350 lb (158.8 kg) SpO2 BMI Smoking Status 96% 44.94 kg/m2 Current Every Day Smoker Vitals History BMI and BSA Data Body Mass Index Body Surface Area 44.94 kg/m 2 2.88 m 2 Preferred Pharmacy Pharmacy Name Phone [id number] [address] [phone] Your Updated Medication List  
  
   
 This list is accurate as of 6/27/18  8:54 AM.  Always use your most recent med list.  
  
  
  
  
 budesonide-formoterol 80-4.5 mcg/actuation Hfaa Commonly known as:  SYMBICORT Take 2 Puffs by inhalation two (2) times a day. metaxalone 800 mg tablet Commonly known as:  SKELAXIN Take 1 Tab by mouth three (3) times daily. naltrexone-buPROPion 8-90 mg Tber ER tablet Commonly known as:  Meredith Bennett Week 1 1 tab PO QAM, Week 2 1QAM 1QHS, Week 3 2QAM 1 QHS, Week 4 & beyond 2QAM 2QHS  
  
 naproxen 500 mg tablet Commonly known as:  NAPROSYN  
TAKE 1 TABLET BY MOUTH TWO TIMES A DAY  
  
 omeprazole 40 mg capsule Commonly known as:  PRILOSEC  
TAKE 1 CAPSULE DAILY  
  
 rosuvastatin 20 mg tablet Commonly known as:  CRESTOR Take 1 Tab by mouth nightly. sildenafil (antihypertensive) 20 mg tablet Commonly known as:  REVATIO  
2-5 tabs 60 minutes before sexual activity  
  
 topiramate 50 mg tablet Commonly known as:  TOPAMAX TAKE 1 TABLET TWICE A DAY  
  
 triamcinolone acetonide 0.5 % ointment Commonly known as:  KENALOG Apply  to affected area two (2) times daily as needed for Skin Irritation. use thin layer Prescriptions Sent to Pharmacy Refills  
 metaxalone (SKELAXIN) 800 mg tablet 0 Sig: Take 1 Tab by mouth three (3) times daily. Class: Normal  
 Pharmacy: [address] [place] [address] Ph #: [phone] Route: Oral  
 naltrexone-buPROPion (CONTRAVE) 8-90 mg TbER ER tablet 0 Sig: Week 1 1 tab PO QAM, Week 2 1QAM 1QHS, Week 3 2QAM 1 QHS, Week 4 & beyond 2QAM 2QHS Class: Normal  
 Pharmacy: [address] [place] [address] Ph #: [phone] Follow-up Instructions Return in about 2 months (around 8/27/2018) for Morbid obesity.   
  
To-Do List   
 08/02/2018 8:30 AM  
 Appointment with [facility] CT RM 2 at [facility] RAD CT ([phone]) DIET RESTRICTIONS  Nothing to eat or drink 4 hours prior to study May have water to take meds  GENERAL INSTRUCTIONS  If you were given medications to take for a contrast allergy prior to having this study, please arrange to have someone drive you to your appointment. If you have had a creatinine level drawn within the past 30 days, please bring most recent results to your appt. This study does not require you to drink contrast prior to your study. MEDICATIONS  Bring a complete list of all medications you are currently taking to include prescriptions, over-the-counter meds, herbals, vitamins & any dietary supplements. OUTSIDE FILMS  Bring outside films, CDs, and reports related to the study with you on the day of your exam.  QUESTIONS  Notify the CT Department if you have any questions concerning your study. Mindi Geoff [phone] - [phone] [facility] [phone] Patient Instructions For the next week use 1/2 pill of topamax, then stop completely. Start contrave the following day after completion of taper Stop phentermine. Use Skelaxin for muscle relaxer. Consider seeing a sleep doctor. Back Pain: Care Instructions Your Care Instructions Back pain has many possible causes. It is often related to problems with muscles and ligaments of the back. It may also be related to problems with the nerves, discs, or bones of the back. Moving, lifting, standing, sitting, or sleeping in an awkward way can strain the back. Sometimes you don't notice the injury until later. Arthritis is another common cause of back pain. Although it may hurt a lot, back pain usually improves on its own within several weeks. Most people recover in 12 weeks or less. Using good home treatment and being careful not to stress your back can help you feel better sooner. Follow-up care is a key part of your treatment and safety. Be sure to make and go to all appointments, and call your doctor if you are having problems. It's also a good idea to know your test results and keep a list of the medicines you take. How can you care for yourself at home? · Sit or lie in positions that are most comfortable and reduce your pain. Try one of these positions when you lie down: ¨ Lie on your back with your knees bent and supported by large pillows. ¨ Lie on the floor with your legs on the seat of a sofa or chair. Ange Husk on your side with your knees and hips bent and a pillow between your legs. ¨ Lie on your stomach if it does not make pain worse. · Do not sit up in bed, and avoid soft couches and twisted positions. Bed rest can help relieve pain at first, but it delays healing. Avoid bed rest after the first day of back pain. · Change positions every 30 minutes. If you must sit for long periods of time, take breaks from sitting. Get up and walk around, or lie in a comfortable position. · Try using a heating pad on a low or medium setting for 15 to 20 minutes every 2 or 3 hours. Try a warm shower in place of one session with the heating pad. · You can also try an ice pack for 10 to 15 minutes every 2 to 3 hours. Put a thin cloth between the ice pack and your skin. · Take pain medicines exactly as directed. ¨ If the doctor gave you a prescription medicine for pain, take it as prescribed. ¨ If you are not taking a prescription pain medicine, ask your doctor if you can take an over-the-counter medicine. · Take short walks several times a day. You can start with 5 to 10 minutes, 3 or 4 times a day, and work up to longer walks. Walk on level surfaces and avoid hills and stairs until your back is better. · Return to work and other activities as soon as you can. Continued rest without activity is usually not good for your back. · To prevent future back pain, do exercises to stretch and strengthen your back and stomach. Learn how to use good posture, safe lifting techniques, and proper body mechanics. When should you call for help? Call your doctor now or seek immediate medical care if: 
? · You have new or worsening numbness in your legs. ? · You have new or worsening weakness in your legs. (This could make it hard to stand up.) ? · You lose control of your bladder or bowels. ? Watch closely for changes in your health, and be sure to contact your doctor if: 
? · Your pain gets worse. ? · You are not getting better after 2 weeks. Where can you learn more? Go to http://nohelia-martina.info/. Enter G841 in the search box to learn more about \"Back Pain: Care Instructions. \" Current as of: March 21, 2017 Content Version: 11.4 © 7537-3004 [employer]. Care instructions adapted under license by [employer] (which disclaims liability or warranty for this information). If you have questions about a medical condition or this instruction, always ask your healthcare professional. [address] any warranty or liability for your use of this information. Introducing [facility]! Dear Keegan Quesada: 
Thank you for requesting a Splendia account. Our records indicate that you already have an active Splendia account. You can access your account anytime at [email or web address] [email or web address] [employer]. ColoWrap/[employer]enda [employer] Did you know that you can access your hospital and ER discharge instructions at any time in Splendia? You can also review all of your test results from your hospital stay or ER visit. Additional Information If you have questions, please visit the Frequently Asked Questions section of the Splendia website at [email or web address]. [employer]/. Remember, Splendia is NOT to be used for urgent needs. For medical emergencies, dial 911. Now available from your iPhone and Android! Please provide this summary of care documentation to your next provider. Your primary care clinician is listed as Yady Du. If you have any questions after today's visit, please call [phone].

## 2018-07-23 DIAGNOSIS — M54.50 BILATERAL LOW BACK PAIN WITHOUT SCIATICA, UNSPECIFIED CHRONICITY: ICD-10-CM

## 2018-07-24 RX ORDER — METAXALONE 800 MG/1
TABLET ORAL
Qty: 90 TAB | Refills: 0 | Status: SHIPPED | OUTPATIENT
Start: 2018-07-24 | End: 2019-02-18 | Stop reason: SDUPTHER

## 2018-08-02 ENCOUNTER — HOSPITAL ENCOUNTER (OUTPATIENT)
Dept: CT IMAGING | Age: 53
Discharge: HOME | End: 2018-08-02
Attending: INTERNAL MEDICINE
Payer: COMMERCIAL

## 2018-08-02 DIAGNOSIS — R59.0 MEDIASTINAL LYMPHADENOPATHY: ICD-10-CM

## 2018-08-02 DIAGNOSIS — R91.8 LUNG NODULES: ICD-10-CM

## 2018-08-02 PROCEDURE — 71250 CT THORAX DX C-: CPT

## 2018-09-05 ENCOUNTER — OFFICE VISIT (OUTPATIENT)
Dept: FAMILY MEDICINE CLINIC | Age: 53
End: 2018-09-05

## 2018-09-05 VITALS
BODY MASS INDEX: 40.43 KG/M2 | OXYGEN SATURATION: 96 % | HEART RATE: 78 BPM | SYSTOLIC BLOOD PRESSURE: 134 MMHG | TEMPERATURE: 97.9 F | HEIGHT: 74 IN | RESPIRATION RATE: 18 BRPM | WEIGHT: 315 LBS | DIASTOLIC BLOOD PRESSURE: 80 MMHG

## 2018-09-05 DIAGNOSIS — E66.01 MORBID OBESITY (HCC): Primary | ICD-10-CM

## 2018-09-05 NOTE — PATIENT INSTRUCTIONS
Taper Contrave 2 tabs in morning for 1 week and then 1 tab in morning for 1 week. Then stop contrave. Call me 2 weeks afterwards and we can restart phentermine and topamax. Body Mass Index: Care Instructions  Your Care Instructions    Body mass index (BMI) can help you see if your weight is raising your risk for health problems. It uses a formula to compare how much you weigh with how tall you are. · A BMI lower than 18.5 is considered underweight. · A BMI between 18.5 and 24.9 is considered healthy. · A BMI between 25 and 29.9 is considered overweight. A BMI of 30 or higher is considered obese. If your BMI is in the normal range, it means that you have a lower risk for weight-related health problems. If your BMI is in the overweight or obese range, you may be at increased risk for weight-related health problems, such as high blood pressure, heart disease, stroke, arthritis or joint pain, and diabetes. If your BMI is in the underweight range, you may be at increased risk for health problems such as fatigue, lower protection (immunity) against illness, muscle loss, bone loss, hair loss, and hormone problems. BMI is just one measure of your risk for weight-related health problems. You may be at higher risk for health problems if you are not active, you eat an unhealthy diet, or you drink too much alcohol or use tobacco products. Follow-up care is a key part of your treatment and safety. Be sure to make and go to all appointments, and call your doctor if you are having problems. It's also a good idea to know your test results and keep a list of the medicines you take. How can you care for yourself at home? · Practice healthy eating habits. This includes eating plenty of fruits, vegetables, whole grains, lean protein, and low-fat dairy. · If your doctor recommends it, get more exercise. Walking is a good choice. Bit by bit, increase the amount you walk every day.  Try for at least 30 minutes on most days of the week. · Do not smoke. Smoking can increase your risk for health problems. If you need help quitting, talk to your doctor about stop-smoking programs and medicines. These can increase your chances of quitting for good. · Limit alcohol to 2 drinks a day for men and 1 drink a day for women. Too much alcohol can cause health problems. If you have a BMI higher than 25  · Your doctor may do other tests to check your risk for weight-related health problems. This may include measuring the distance around your waist. A waist measurement of more than 40 inches in men or 35 inches in women can increase the risk of weight-related health problems. · Talk with your doctor about steps you can take to stay healthy or improve your health. You may need to make lifestyle changes to lose weight and stay healthy, such as changing your diet and getting regular exercise. If you have a BMI lower than 18.5  · Your doctor may do other tests to check your risk for health problems. · Talk with your doctor about steps you can take to stay healthy or improve your health. You may need to make lifestyle changes to gain or maintain weight and stay healthy, such as getting more healthy foods in your diet and doing exercises to build muscle. Where can you learn more? Go to http://nohelia-martina.info/. Enter S176 in the search box to learn more about \"Body Mass Index: Care Instructions. \"  Current as of: October 13, 2016  Content Version: 11.4  © 4473-2841 Healthwise, Incorporated. Care instructions adapted under license by [employer] (which disclaims liability or warranty for this information). If you have questions about a medical condition or this instruction, always ask your healthcare professional. [address] any warranty or liability for your use of this information.

## 2018-09-05 NOTE — MR AVS SNAPSHOT
[address] [facility] [address] [address] 
[phone] Patient: Shadi Hernandez MRN: [id number] :1965 Visit Information Date & Time Provider Department Dept. Phone Encounter #  
 2018  8:00 AM Gwendolyn Mckinney, [address] [phone] [id number] Follow-up Instructions Return if symptoms worsen or fail to improve. Your Appointments 2018  8:30 AM  
Follow Up with Eddi Blake MD  
[address] ([address]) Appt Note: from 18  
 [address] [place] [address], [address] [address]  
[phone]  
  
   
 [address] [place] [address], [address] [facility] [place] [address] Upcoming Health Maintenance Date Due DTaP/Tdap/Td series (1 - Tdap) 10/9/1986 FOBT Q 1 YEAR AGE 50-75 10/9/2015 Influenza Age 5 to Adult 2018 Allergies as of 2018  Review Complete On: 2018 By: Kassidy Slater, JOLENEMT, RT, NM, ARRT Severity Noted Reaction Type Reactions Vicodin [Hydrocodone-acetaminophen] Medium 2015    Itching Current Immunizations  Never Reviewed No immunizations on file. Not reviewed this visit You Were Diagnosed With   
  
 Codes Comments Morbid obesity ([facility] [address]    -  Primary ICD-10-CM: E66.01 
ICD-9-CM: 278.01 Vitals BP Pulse Temp Resp Height(growth percentile) Weight(growth percentile) 134/80 (BP 1 Location: Right arm, BP Patient Position: Sitting) 78 97.9 °F (36.6 °C) (Oral) 18 6' 2\" (1.88 m) (!) 358 lb (162.4 kg) SpO2 BMI Smoking Status 96% 45.96 kg/m2 Current Every Day Smoker Vitals History BMI and BSA Data Body Mass Index Body Surface Area 45.96 kg/m 2 2.91 m 2 Preferred Pharmacy Pharmacy Name Phone [id number] [address] [phone] Your Updated Medication List  
  
   
This list is accurate as of 9/5/18  8:45 AM.  Always use your most recent med list.  
  
  
  
  
 budesonide-formoterol 80-4.5 mcg/actuation Hfaa Commonly known as:  SYMBICORT Take 2 Puffs by inhalation two (2) times a day. metaxalone 800 mg tablet Commonly known as:  SKELAXIN  
TAKE 1 TABLET BY MOUTH THREE TIMES A DAY  
  
 naproxen 500 mg tablet Commonly known as:  NAPROSYN  
TAKE 1 TABLET BY MOUTH TWO TIMES A DAY  
  
 omeprazole 40 mg capsule Commonly known as:  PRILOSEC  
TAKE 1 CAPSULE DAILY  
  
 rosuvastatin 20 mg tablet Commonly known as:  CRESTOR Take 1 Tab by mouth nightly. sildenafil (antihypertensive) 20 mg tablet Commonly known as:  REVATIO  
2-5 tabs 60 minutes before sexual activity  
  
 triamcinolone acetonide 0.5 % ointment Commonly known as:  KENALOG Apply  to affected area two (2) times daily as needed for Skin Irritation. use thin layer Follow-up Instructions Return if symptoms worsen or fail to improve. Patient Instructions Taper Contrave 2 tabs in morning for 1 week and then 1 tab in morning for 1 week. Then stop contrave. Call me 2 weeks afterwards and we can restart phentermine and topamax. Body Mass Index: Care Instructions Your Care Instructions Body mass index (BMI) can help you see if your weight is raising your risk for health problems. It uses a formula to compare how much you weigh with how tall you are. · A BMI lower than 18.5 is considered underweight. · A BMI between 18.5 and 24.9 is considered healthy. · A BMI between 25 and 29.9 is considered overweight. A BMI of 30 or higher is considered obese. If your BMI is in the normal range, it means that you have a lower risk for weight-related health problems.  If your BMI is in the overweight or obese range, you may be at increased risk for weight-related health problems, such as high blood pressure, heart disease, stroke, arthritis or joint pain, and diabetes. If your BMI is in the underweight range, you may be at increased risk for health problems such as fatigue, lower protection (immunity) against illness, muscle loss, bone loss, hair loss, and hormone problems. BMI is just one measure of your risk for weight-related health problems. You may be at higher risk for health problems if you are not active, you eat an unhealthy diet, or you drink too much alcohol or use tobacco products. Follow-up care is a key part of your treatment and safety. Be sure to make and go to all appointments, and call your doctor if you are having problems. It's also a good idea to know your test results and keep a list of the medicines you take. How can you care for yourself at home? · Practice healthy eating habits. This includes eating plenty of fruits, vegetables, whole grains, lean protein, and low-fat dairy. · If your doctor recommends it, get more exercise. Walking is a good choice. Bit by bit, increase the amount you walk every day. Try for at least 30 minutes on most days of the week. · Do not smoke. Smoking can increase your risk for health problems. If you need help quitting, talk to your doctor about stop-smoking programs and medicines. These can increase your chances of quitting for good. · Limit alcohol to 2 drinks a day for men and 1 drink a day for women. Too much alcohol can cause health problems. If you have a BMI higher than 25 · Your doctor may do other tests to check your risk for weight-related health problems. This may include measuring the distance around your waist. A waist measurement of more than 40 inches in men or 35 inches in women can increase the risk of weight-related health problems. · Talk with your doctor about steps you can take to stay healthy or improve your health.  You may need to make lifestyle changes to lose weight and stay healthy, such as changing your diet and getting regular exercise. If you have a BMI lower than 18.5 · Your doctor may do other tests to check your risk for health problems. · Talk with your doctor about steps you can take to stay healthy or improve your health. You may need to make lifestyle changes to gain or maintain weight and stay healthy, such as getting more healthy foods in your diet and doing exercises to build muscle. Where can you learn more? Go to http://nohelia-martina.info/. Enter S176 in the search box to learn more about \"Body Mass Index: Care Instructions. \" Current as of: October 13, 2016 Content Version: 11.4 © 0533-1982 [employer]. Care instructions adapted under license by [employer] (which disclaims liability or warranty for this information). If you have questions about a medical condition or this instruction, always ask your healthcare professional. [address] any warranty or liability for your use of this information. Introducing [facility]! Dear Sheeba Plummer: 
Thank you for requesting a BlueSwarm account. Our records indicate that you already have an active BlueSwarm account. You can access your account anytime at [email or web address] Did you know that you can access your hospital and ER discharge instructions at any time in BlueSwarm? You can also review all of your test results from your hospital stay or ER visit. Additional Information If you have questions, please visit the Frequently Asked Questions section of the BlueSwarm website at [email or web address] Remember, BlueSwarm is NOT to be used for urgent needs. For medical emergencies, dial 911. Now available from your iPhone and Android! Please provide this summary of care documentation to your next provider. Your primary care clinician is listed as Dominick Mosley.  If you have any questions after today's visit, please call [phone].

## 2018-09-05 NOTE — PROGRESS NOTES
Barb Rodriguez is a 46 y.o. male here for follow up on weight loss. The contrave is causing depression.

## 2018-09-05 NOTE — PROGRESS NOTES
Morbid Obesity        HPI: Lina Quiroz is a 46 y.o. male WHITE [place]   Here in follow up of his morbid obesity. He has been using Contrave but this has caused him to feel markedly depressed. He has not gone to work since starting the medication about a month ago. He is self employed. He feels that the contrave has not suppressed his appetite at all and may actually caused it to increase. Past Medical History:   Diagnosis Date    Asthma     ED (erectile dysfunction)     GERD (gastroesophageal reflux disease)     Hematuria     Hypercholesterolemia 2010    Hyperlipidemia     Hypogonadism, male     Kidney stone     Morbid obesity (HCC)        Current Outpatient Prescriptions   Medication Sig    metaxalone (SKELAXIN) 800 mg tablet TAKE 1 TABLET BY MOUTH THREE TIMES A DAY    naltrexone-buPROPion (CONTRAVE) 8-90 mg TbER ER tablet Take 2 Tabs by mouth two (2) times a day. Indications: WEIGHT LOSS MANAGEMENT FOR OBESE PATIENT (BMI >= 30)    budesonide-formoterol (SYMBICORT) 80-4.5 mcg/actuation HFAA Take 2 Puffs by inhalation two (2) times a day.  omeprazole (PRILOSEC) 40 mg capsule TAKE 1 CAPSULE DAILY    sildenafil, antihypertensive, (REVATIO) 20 mg tablet 2-5 tabs 60 minutes before sexual activity    rosuvastatin (CRESTOR) 20 mg tablet Take 1 Tab by mouth nightly.  naproxen (NAPROSYN) 500 mg tablet TAKE 1 TABLET BY MOUTH TWO TIMES A DAY    triamcinolone acetonide (KENALOG) 0.5 % ointment Apply  to affected area two (2) times daily as needed for Skin Irritation. use thin layer    topiramate (TOPAMAX) 50 mg tablet TAKE 1 TABLET TWICE A DAY     No current facility-administered medications for this visit.         Allergies   Allergen Reactions    Vicodin [Hydrocodone-Acetaminophen] Itching       Past Surgical History:   Procedure Laterality Date    HX COLONOSCOPY  12-    4 polyps removed    HX HEENT      eye surgery as a child    HX ORTHOPAEDIC Left 2003    left lateral meniscus amputated    HX UROLOGICAL Left 04/27/2017    [facility]. Cysto, bilateral RPG, left URS and placement of left double J ureteral stent - Dr. Cindy Alvarez.  HX UROLOGICAL  05/18/2017    [facility]. Cystoscopy, left ureteroscopy, holmium laser lithotripsy and left double-J ureteral stent removal. Dr. Cindy Alvarez. Family History   Problem Relation Age of Onset    Diabetes Mother     Stroke Mother     Arthritis-osteo Father     Hypertension Father     Arthritis-osteo Brother     Heart Attack Brother     Diabetes Maternal Grandmother        Social History     Social History    Marital status:      Spouse name: N/A    Number of children: N/A    Years of education: N/A     Occupational History          Social History Main Topics    Smoking status: Current Every Day Smoker     Packs/day: 2.00     Years: 45.00     Types: Cigarettes     Start date: 1/1/1970     Last attempt to quit: 7/13/2016    Smokeless tobacco: Never Used    Alcohol use No    Drug use: No    Sexual activity: Not Currently     Other Topics Concern    Not on file     Social History Narrative       Gen- No weight loss, No Malaise, No fatigue  Eyes- No dipoplia, blurry vision  [facility]- No Chest pain, no palpitations, no edema  Resp- No Cough, SOB, MALAGON, Wheezing  Neuro- No headaches  Skin- No easy bruising, No rashes      Visit Vitals    /80 (BP 1 Location: Right arm, BP Patient Position: Sitting)    Pulse 78    Temp 97.9 °F (36.6 °C) (Oral)    Resp 18    Ht 6' 2\" (1.88 m)    Wt (!) 358 lb (162.4 kg)    SpO2 96%    BMI 45.96 kg/m2       Physical Exam   Constitutional: He is oriented to person, place, and time. He appears well-developed and well-nourished. No distress. obese   HENT:   Head: Normocephalic and atraumatic. Right Ear: External ear normal.   Left Ear: External ear normal.   Cardiovascular: Normal rate, regular rhythm and normal heart sounds. No murmur heard.   Pulmonary/Chest: Effort normal and breath sounds normal. No respiratory distress. He has no wheezes. He has no rales. Neurological: He is alert and oriented to person, place, and time. Skin: Skin is warm and dry. He is not diaphoretic. Psychiatric: He has a normal mood and affect. His behavior is normal. Thought content normal.           Assesment:    ICD-10-CM ICD-9-CM    1. Morbid obesity ([facility]) E66.01 278.01 REFERRAL TO WEIGHT LOSS     Taper Contrave 2 tabs in morning for 1 week and then 1 tab in morning for 1 week. Then stop contrave. Call me 2 weeks afterwards and we can restart phentermine and topamax. I have discussed the diagnosis with the patient and the intended management  The patient has received an after-visit summary and questions were answered concerning future plans. I have discussed medication usage, side effects and warnings with the patient as well. I have reviewed the plan of care with the patient, accepted their input and they are in agreement with the treatment goals. Follow-up Disposition:  Return if symptoms worsen or fail to improve.       Evi Beasley MD                .

## 2018-09-13 ENCOUNTER — TELEPHONE (OUTPATIENT)
Dept: PULMONOLOGY | Age: 53
End: 2018-09-13

## 2018-09-13 DIAGNOSIS — R91.1 LUNG NODULE: Primary | ICD-10-CM

## 2018-09-13 NOTE — TELEPHONE ENCOUNTER
Pt wife rescheduled 9/13/18 appt due to weather. Would like Dr. Vishnu Aguero to call regarding the results if possible. Please advise [phone].

## 2018-09-24 ENCOUNTER — TELEPHONE (OUTPATIENT)
Dept: FAMILY MEDICINE CLINIC | Age: 53
End: 2018-09-24

## 2018-09-24 DIAGNOSIS — E66.01 MORBID OBESITY (HCC): Primary | ICD-10-CM

## 2018-09-24 RX ORDER — PHENTERMINE HYDROCHLORIDE 15 MG/1
15 CAPSULE ORAL
Qty: 30 CAP | Refills: 1 | OUTPATIENT
Start: 2018-09-24 | End: 2018-11-14 | Stop reason: DRUGHIGH

## 2018-09-24 NOTE — TELEPHONE ENCOUNTER
Patients wife is calling would like to have Sergio Aguero call her back regarding some questions that she has about a weight loss referral as well as a medication that he was supposed to stop taking after 2 weeks. Patient would not go further in detail about phone call.

## 2018-09-24 NOTE — TELEPHONE ENCOUNTER
Returned call to to, she thinks Dr. Oumar Lawton was going to prescribe phentermine again since stopping the contrave. Also, checked on referral, it was place, will fax in case it wasn't received.

## 2018-09-24 NOTE — PROGRESS NOTES
Patient with hx of Morbid obesity. Did not tolerate Contrave. Has now tapered off of it. Will restart phentermine and topamax combnation. ICD-10-CM ICD-9-CM    1.  Morbid obesity (HCC) E66.01 278.01 topiramate ER (TROKENDI XR) 25 mg capsule      phentermine (ADIPEX_P) 15 mg capsule       [address] Maxime Ruiz MD

## 2018-10-18 ENCOUNTER — CLINICAL SUPPORT (OUTPATIENT)
Dept: FAMILY MEDICINE CLINIC | Age: 53
End: 2018-10-18

## 2018-10-18 DIAGNOSIS — E66.9 OBESITY, UNSPECIFIED CLASSIFICATION, UNSPECIFIED OBESITY TYPE, UNSPECIFIED WHETHER SERIOUS COMORBIDITY PRESENT: Primary | ICD-10-CM

## 2018-10-18 NOTE — PROGRESS NOTES
Patient attended a Medically Supervised Weight Loss New Patient Orientation today where we discussed:  - New Direction Very Low Calorie Diet details  - Medical Supervision  - Nutrition education  - Cost  - Policies and compliance required for program enrollment. PATIENT CHOSE NOT TO ENROLL AT THIS TIME DUE TO patient left after orientation and didn't stay to schedule consult. Patients were advised if they didn't schedule after orientation, they could call the  to schedule within 6 months without repeating orientation.

## 2018-10-22 DIAGNOSIS — K21.9 GASTROESOPHAGEAL REFLUX DISEASE WITHOUT ESOPHAGITIS: ICD-10-CM

## 2018-10-22 RX ORDER — OMEPRAZOLE 40 MG/1
CAPSULE, DELAYED RELEASE ORAL
Qty: 90 CAP | Refills: 2 | Status: SHIPPED | OUTPATIENT
Start: 2018-10-22 | End: 2019-09-23 | Stop reason: SDUPTHER

## 2018-10-29 DIAGNOSIS — E78.49 OTHER HYPERLIPIDEMIA: ICD-10-CM

## 2018-10-30 DIAGNOSIS — M25.562 CHRONIC PAIN OF BOTH KNEES: ICD-10-CM

## 2018-10-30 DIAGNOSIS — G89.29 CHRONIC PAIN OF BOTH KNEES: ICD-10-CM

## 2018-10-30 DIAGNOSIS — M25.561 CHRONIC PAIN OF BOTH KNEES: ICD-10-CM

## 2018-10-30 DIAGNOSIS — M25.50 MULTIPLE JOINT PAIN: ICD-10-CM

## 2018-10-30 RX ORDER — OXYCODONE AND ACETAMINOPHEN 5; 325 MG/1; MG/1
1-2 TABLET ORAL
Qty: 60 TAB | Refills: 0 | Status: SHIPPED | OUTPATIENT
Start: 2018-10-30 | End: 2019-03-06 | Stop reason: SDUPTHER

## 2018-10-30 RX ORDER — ROSUVASTATIN CALCIUM 20 MG/1
TABLET, COATED ORAL
Qty: 90 TAB | Refills: 2 | Status: SHIPPED | OUTPATIENT
Start: 2018-10-30 | End: 2019-06-26

## 2018-11-14 ENCOUNTER — OFFICE VISIT (OUTPATIENT)
Dept: FAMILY MEDICINE CLINIC | Age: 53
End: 2018-11-14

## 2018-11-14 ENCOUNTER — HOSPITAL ENCOUNTER (OUTPATIENT)
Dept: LAB | Age: 53
Discharge: HOME | End: 2018-11-14
Payer: COMMERCIAL

## 2018-11-14 VITALS
SYSTOLIC BLOOD PRESSURE: 132 MMHG | OXYGEN SATURATION: 98 % | WEIGHT: 315 LBS | TEMPERATURE: 98.3 F | BODY MASS INDEX: 40.43 KG/M2 | HEIGHT: 74 IN | DIASTOLIC BLOOD PRESSURE: 80 MMHG | HEART RATE: 67 BPM | RESPIRATION RATE: 18 BRPM

## 2018-11-14 DIAGNOSIS — E78.00 PURE HYPERCHOLESTEROLEMIA: ICD-10-CM

## 2018-11-14 DIAGNOSIS — Z86.39 HISTORY OF HYPOGONADISM: ICD-10-CM

## 2018-11-14 DIAGNOSIS — N52.9 ERECTILE DYSFUNCTION, UNSPECIFIED ERECTILE DYSFUNCTION TYPE: ICD-10-CM

## 2018-11-14 DIAGNOSIS — E66.01 MORBID OBESITY (HCC): Primary | ICD-10-CM

## 2018-11-14 DIAGNOSIS — R73.03 PREDIABETES: ICD-10-CM

## 2018-11-14 DIAGNOSIS — R29.818 SUSPECTED SLEEP APNEA: ICD-10-CM

## 2018-11-14 DIAGNOSIS — M62.08 DIASTASIS RECTI: ICD-10-CM

## 2018-11-14 LAB
ALBUMIN SERPL-MCNC: 4 G/DL (ref 3.4–5)
ALBUMIN/GLOB SERPL: 1.1 {RATIO} (ref 0.8–1.7)
ALP SERPL-CCNC: 90 U/L (ref 45–117)
ALT SERPL-CCNC: 53 U/L (ref 16–61)
ANION GAP SERPL CALC-SCNC: 8 MMOL/L (ref 3–18)
AST SERPL-CCNC: 24 U/L (ref 15–37)
BASOPHILS # BLD: 0.1 K/UL (ref 0–0.1)
BASOPHILS NFR BLD: 1 % (ref 0–2)
BILIRUB SERPL-MCNC: 0.6 MG/DL (ref 0.2–1)
BUN SERPL-MCNC: 18 MG/DL (ref 7–18)
BUN/CREAT SERPL: 16 (ref 12–20)
CALCIUM SERPL-MCNC: 8.9 MG/DL (ref 8.5–10.1)
CHLORIDE SERPL-SCNC: 107 MMOL/L (ref 100–108)
CHOLEST SERPL-MCNC: 182 MG/DL
CO2 SERPL-SCNC: 24 MMOL/L (ref 21–32)
CREAT SERPL-MCNC: 1.11 MG/DL (ref 0.6–1.3)
DIFFERENTIAL METHOD BLD: ABNORMAL
EOSINOPHIL # BLD: 0.2 K/UL (ref 0–0.4)
EOSINOPHIL NFR BLD: 2 % (ref 0–5)
ERYTHROCYTE [DISTWIDTH] IN BLOOD BY AUTOMATED COUNT: 13.6 % (ref 11.6–14.5)
EST. AVERAGE GLUCOSE BLD GHB EST-MCNC: 143 MG/DL
GLOBULIN SER CALC-MCNC: 3.8 G/DL (ref 2–4)
GLUCOSE SERPL-MCNC: 118 MG/DL (ref 74–99)
HBA1C MFR BLD: 6.6 % (ref 4.2–5.6)
HCT VFR BLD AUTO: 55.6 % (ref 36–48)
HDLC SERPL-MCNC: 36 MG/DL (ref 40–60)
HDLC SERPL: 5.1 {RATIO} (ref 0–5)
HGB BLD-MCNC: 18.3 G/DL (ref 13–16)
LDLC SERPL CALC-MCNC: 103.2 MG/DL (ref 0–100)
LIPID PROFILE,FLP: ABNORMAL
LYMPHOCYTES # BLD: 2.1 K/UL (ref 0.9–3.6)
LYMPHOCYTES NFR BLD: 26 % (ref 21–52)
MCH RBC QN AUTO: 30.7 PG (ref 24–34)
MCHC RBC AUTO-ENTMCNC: 32.9 G/DL (ref 31–37)
MCV RBC AUTO: 93.3 FL (ref 74–97)
MONOCYTES # BLD: 0.5 K/UL (ref 0.05–1.2)
MONOCYTES NFR BLD: 6 % (ref 3–10)
NEUTS SEG # BLD: 5.2 K/UL (ref 1.8–8)
NEUTS SEG NFR BLD: 65 % (ref 40–73)
PLATELET # BLD AUTO: 208 K/UL (ref 135–420)
PMV BLD AUTO: 10.1 FL (ref 9.2–11.8)
POTASSIUM SERPL-SCNC: 4.7 MMOL/L (ref 3.5–5.5)
PROT SERPL-MCNC: 7.8 G/DL (ref 6.4–8.2)
RBC # BLD AUTO: 5.96 M/UL (ref 4.7–5.5)
SODIUM SERPL-SCNC: 139 MMOL/L (ref 136–145)
TRIGL SERPL-MCNC: 214 MG/DL (ref ?–150)
VLDLC SERPL CALC-MCNC: 42.8 MG/DL
WBC # BLD AUTO: 8 K/UL (ref 4.6–13.2)

## 2018-11-14 PROCEDURE — 80061 LIPID PANEL: CPT

## 2018-11-14 PROCEDURE — 84403 ASSAY OF TOTAL TESTOSTERONE: CPT

## 2018-11-14 PROCEDURE — 80053 COMPREHEN METABOLIC PANEL: CPT

## 2018-11-14 PROCEDURE — 83036 HEMOGLOBIN GLYCOSYLATED A1C: CPT

## 2018-11-14 PROCEDURE — 85025 COMPLETE CBC W/AUTO DIFF WBC: CPT

## 2018-11-14 RX ORDER — SILDENAFIL 50 MG/1
TABLET, FILM COATED ORAL
Qty: 10 TAB | Refills: 2 | Status: SHIPPED | OUTPATIENT
Start: 2018-11-14 | End: 2019-06-26

## 2018-11-14 RX ORDER — PHENTERMINE HYDROCHLORIDE 37.5 MG/1
37.5 TABLET ORAL
Qty: 30 TAB | Refills: 2 | OUTPATIENT
Start: 2018-11-14 | End: 2019-02-04 | Stop reason: SDUPTHER

## 2018-11-14 NOTE — PROGRESS NOTES
Weight Management        HPI: Rachid Myrick is a 48 y.o. male [facility] here in follow-up of his morbid obesity. He has been using phentermine 50 mg daily as well as Trokendi XR 25 mg nightly. He denies any side effect with these medications. He does not see much in the way of appetite suppression however and his weight here has increased by 3 pounds since his last visit 2 months ago. Reports recurrent mid abdominal pain. He feels like his abdomen is getting larger in size. Past Medical History:   Diagnosis Date    Asthma     ED (erectile dysfunction)     GERD (gastroesophageal reflux disease)     Hematuria     Hypercholesterolemia 2010    Hyperlipidemia     Hypogonadism, male     Kidney stone     Morbid obesity (HCC)        Current Outpatient Medications   Medication Sig    rosuvastatin (CRESTOR) 20 mg tablet TAKE 1 TABLET NIGHTLY (DISCONTINUE LIPITOR, ALTERNATE THERAPY)    oxyCODONE-acetaminophen (PERCOCET) 5-325 mg per tablet Take 1-2 Tabs by mouth every six (6) hours as needed for Pain. Max Daily Amount: 8 Tabs.  omeprazole (PRILOSEC) 40 mg capsule TAKE 1 CAPSULE DAILY    topiramate ER (TROKENDI XR) 25 mg capsule Take 1 Cap by mouth daily.  phentermine (ADIPEX_P) 15 mg capsule Take 1 Cap by mouth every morning. Max Daily Amount: 15 mg. Indications: WEIGHT LOSS MANAGEMENT FOR OBESE PATIENT (BMI >= 30)    metaxalone (SKELAXIN) 800 mg tablet TAKE 1 TABLET BY MOUTH THREE TIMES A DAY    budesonide-formoterol (SYMBICORT) 80-4.5 mcg/actuation HFAA Take 2 Puffs by inhalation two (2) times a day.  triamcinolone acetonide (KENALOG) 0.5 % ointment Apply  to affected area two (2) times daily as needed for Skin Irritation. use thin layer    sildenafil, antihypertensive, (REVATIO) 20 mg tablet 2-5 tabs 60 minutes before sexual activity    naproxen (NAPROSYN) 500 mg tablet TAKE 1 TABLET BY MOUTH TWO TIMES A DAY     No current facility-administered medications for this visit. Allergies   Allergen Reactions    Vicodin [Hydrocodone-Acetaminophen] Itching    Contrave [Naltrexone-Bupropion] Other (comments)     Depression       Past Surgical History:   Procedure Laterality Date    HX COLONOSCOPY  2015    4 polyps removed    HX HEENT      eye surgery as a child    HX ORTHOPAEDIC Left     left lateral meniscus amputated    HX UROLOGICAL Left 2017    [facility] Cysto, bilateral RPG, left URS and placement of left double J ureteral stent - Dr. Saul Trinidad.  HX UROLOGICAL  2017    [facility]. Cystoscopy, left ureteroscopy, holmium laser lithotripsy and left double-J ureteral stent removal. Dr. Saul Trinidad.        Family History   Problem Relation Age of Onset    Diabetes Mother     Stroke Mother     Arthritis-osteo Father     Hypertension Father     Arthritis-osteo Brother     Heart Attack Brother     Diabetes Maternal Grandmother        Social History     Socioeconomic History    Marital status:      Spouse name: Not on file    Number of children: Not on file    Years of education: Not on file    Highest education level: Not on file   Social Needs    Financial resource strain: Not on file    Food insecurity - worry: Not on file    Food insecurity - inability: Not on file   [employer] needs - medical: Not on file   [employer] needs - non-medical: Not on file   Occupational History    Occupation:    Tobacco Use    Smoking status: Current Every Day Smoker     Packs/day: 2.00     Years: 45.00     Pack years: 90.00     Types: Cigarettes     Start date: 1970     Last attempt to quit: 2016     Years since quittin.3    Smokeless tobacco: Never Used   Substance and Sexual Activity    Alcohol use: No     Alcohol/week: 0.0 oz    Drug use: No    Sexual activity: Not Currently   Other Topics Concern    Not on file   Social History Narrative    Not on file       Gen- No weight loss, No Malaise, No fatigue  Eyes- No dipoplia, blurry vision  CVS- No Chest pain, no palpitations, no edema  Resp- No Cough, SOB, MALAGON, Wheezing  Neuro- No headaches  Skin- No easy bruising, No rashes      Visit Vitals  /80 (BP 1 Location: Right arm, BP Patient Position: Sitting)   Pulse 67   Temp 98.3 °F (36.8 °C) (Oral)   Resp 18   Ht 6' 2\" (1.88 m)   Wt (!) 361 lb (163.7 kg)   SpO2 98%   BMI 46.35 kg/m²       Physical Exam   Constitutional: He appears well-developed and well-nourished. HENT:   Head: Normocephalic and atraumatic. Right Ear: External ear normal.   Left Ear: External ear normal.   Cardiovascular: Normal rate, regular rhythm and normal heart sounds. No murmur heard. Pulmonary/Chest: Effort normal and breath sounds normal. No stridor. No respiratory distress. He has no wheezes. Abdominal: Soft. Bowel sounds are normal. He exhibits no distension and no mass. There is tenderness (midgastric). There is no rebound and no guarding. A hernia (abdominal wall- midgastric defect) is present. Skin: Skin is warm and dry. Capillary refill takes less than 2 seconds. He is not diaphoretic. Psychiatric: He has a normal mood and affect. His behavior is normal.           Assesment:    ICD-10-CM ICD-9-CM    1. Morbid obesity (HCC) E66.01 278.01 phentermine (ADIPEX-P) 37.5 mg tablet      topiramate ER (TROKENDI XR) 50 mg capsule   2. Erectile dysfunction, unspecified erectile dysfunction type N52.9 607.84 sildenafil citrate (VIAGRA) 50 mg tablet   3. History of hypogonadism Z86.39 V12.29 CBC WITH AUTOMATED DIFF      TESTOSTERONE, FREE & TOTAL   4. Prediabetes K36.73 578.41 METABOLIC PANEL, COMPREHENSIVE      HEMOGLOBIN A1C WITH EAG   5. Pure hypercholesterolemia E78.00 272.0 LIPID PANEL   6. Diastasis recti M62.08 728.84      1. I have increased his Marilin Exar to 50 mg nightly as well as increase his phentermine to 37.5 mg daily.       2.  For his erectile dysfunction I prescribed him Viagra, 50 mg, to be used as needed he is awake and cut the pills in half.    3.  We will recheck testosterone levels as patient does have a history of hypogonadism. 4.  Patient has history of prediabetes we will recheck hemoglobin A1c today. As he has been gaining weight. 5.  Patient has suspected sleep apnea. Patient is morbidly obese, does have a large neck circumference, does snore at night, does have witnessed apneic spells. Will obtain home sleep study for him. 6.  Patient does have abdominal wall diastases recti large in size. He does. Abdominal pain secondary to this. Will obtain CT scan for further clarification of this. I have discussed the diagnosis with the patient and the intended management  The patient has received an after-visit summary and questions were answered concerning future plans. I have discussed medication usage, side effects and warnings with the patient as well. I have reviewed the plan of care with the patient, accepted their input and they are in agreement with the treatment goals. Follow-up Disposition:  Return for Pending CT scan and sleep study is ordered as well as lab studies. Ruben Rascon MD                .

## 2018-11-14 NOTE — PATIENT INSTRUCTIONS
Viagra will be inexpensive with use of [employer]- least expensive at [facility] and [employer] locally    Please use trokendi 50mg nightly and Phentermine 2 tabs of your current rx every morning (30mg)    Sleep study company will call you to get it setup

## 2018-11-16 LAB
TESTOST FREE SERPL-MCNC: 9 PG/ML (ref 7.2–24)
TESTOST SERPL-MCNC: 339 NG/DL (ref 264–916)

## 2018-11-26 ENCOUNTER — HOSPITAL ENCOUNTER (OUTPATIENT)
Dept: LAB | Age: 53
Discharge: HOME | End: 2018-11-26
Payer: COMMERCIAL

## 2018-11-26 ENCOUNTER — OFFICE VISIT (OUTPATIENT)
Dept: FAMILY MEDICINE CLINIC | Age: 53
End: 2018-11-26

## 2018-11-26 VITALS
TEMPERATURE: 98.1 F | WEIGHT: 315 LBS | OXYGEN SATURATION: 96 % | HEART RATE: 72 BPM | RESPIRATION RATE: 18 BRPM | SYSTOLIC BLOOD PRESSURE: 134 MMHG | DIASTOLIC BLOOD PRESSURE: 73 MMHG | HEIGHT: 74 IN | BODY MASS INDEX: 40.43 KG/M2

## 2018-11-26 DIAGNOSIS — D58.2 ELEVATED HEMOGLOBIN (HCC): ICD-10-CM

## 2018-11-26 DIAGNOSIS — E11.9 TYPE 2 DIABETES MELLITUS WITHOUT COMPLICATION, WITHOUT LONG-TERM CURRENT USE OF INSULIN (HCC): Primary | ICD-10-CM

## 2018-11-26 LAB
FERRITIN SERPL-MCNC: 280 NG/ML (ref 8–388)
IRON SATN MFR SERPL: 32 %
IRON SERPL-MCNC: 128 UG/DL (ref 50–175)
TIBC SERPL-MCNC: 403 UG/DL (ref 250–450)

## 2018-11-26 PROCEDURE — 83540 ASSAY OF IRON: CPT

## 2018-11-26 PROCEDURE — 82728 ASSAY OF FERRITIN: CPT

## 2018-11-26 RX ORDER — METFORMIN HYDROCHLORIDE 500 MG/1
500 TABLET, EXTENDED RELEASE ORAL
Qty: 90 TAB | Refills: 0 | Status: SHIPPED | OUTPATIENT
Start: 2018-11-26 | End: 2019-02-20 | Stop reason: SDUPTHER

## 2018-11-26 NOTE — PATIENT INSTRUCTIONS
Start metformin ER with food once daily        Recheck blood tests in 2 months    I would suggest donating blood to the [facility]. Learning About Meal Planning for Diabetes  Why plan your meals? Meal planning can be a key part of managing diabetes. Planning meals and snacks with the right balance of carbohydrate, protein, and fat can help you keep your blood sugar at the target level you set with your doctor. You don't have to eat special foods. You can eat what your family eats, including sweets once in a while. But you do have to pay attention to how often you eat and how much you eat of certain foods. You may want to work with a dietitian or a certified diabetes educator. He or she can give you tips and meal ideas and can answer your questions about meal planning. This health professional can also help you reach a healthy weight if that is one of your goals. What plan is right for you? Your dietitian or diabetes educator may suggest that you start with the plate format or carbohydrate counting. The plate format  The plate format is a simple way to help you manage how you eat. You plan meals by learning how much space each food should take on a plate. Using the plate format helps you spread carbohydrate throughout the day. It can make it easier to keep your blood sugar level within your target range. It also helps you see if you're eating healthy portion sizes. To use the plate format, you put non-starchy vegetables on half your plate. Add meat or meat substitutes on one-quarter of the plate. Put a grain or starchy vegetable (such as brown rice or a potato) on the final quarter of the plate. You can add a small piece of fruit and some low-fat or fat-free milk or yogurt, depending on your carbohydrate goal for each meal.  Here are some tips for using the plate format:  · Make sure that you are not using an oversized plate. A 9-inch plate is best. Many restaurants use larger plates.   · Get used to using the plate format at home. Then you can use it when you eat out. · Write down your questions about using the plate format. Talk to your doctor, a dietitian, or a diabetes educator about your concerns. Carbohydrate counting  With carbohydrate counting, you plan meals based on the amount of carbohydrate in each food. Carbohydrate raises blood sugar higher and more quickly than any other nutrient. It is found in desserts, breads and cereals, and fruit. It's also found in starchy vegetables such as potatoes and corn, grains such as rice and pasta, and milk and yogurt. Spreading carbohydrate throughout the day helps keep your blood sugar levels within your target range. Your daily amount depends on several things, including your weight, how active you are, which diabetes medicines you take, and what your goals are for your blood sugar levels. A registered dietitian or diabetes educator can help you plan how much carbohydrate to include in each meal and snack. A guideline for your daily amount of carbohydrate is:  · 45 to 60 grams at each meal. That's about the same as 3 to 4 carbohydrate servings. · 15 to 20 grams at each snack. That's about the same as 1 carbohydrate serving. The Nutrition Facts label on packaged foods tells you how much carbohydrate is in a serving of the food. First, look at the serving size on the food label. Is that the amount you eat in a serving? All of the nutrition information on a food label is based on that serving size. So if you eat more or less than that, you'll need to adjust the other numbers. Total carbohydrate is the next thing you need to look for on the label. If you count carbohydrate servings, one serving of carbohydrate is 15 grams. For foods that don't come with labels, such as fresh fruits and vegetables, you'll need a guide that lists carbohydrate in these foods. Ask your doctor, dietitian, or diabetes educator about books or other nutrition guides you can use.   If you take insulin, you need to know how many grams of carbohydrate are in a meal. This lets you know how much rapid-acting insulin to take before you eat. If you use an insulin pump, you get a constant rate of insulin during the day. So the pump must be programmed at meals to give you extra insulin to cover the rise in blood sugar after meals. When you know how much carbohydrate you will eat, you can take the right amount of insulin. Or, if you always use the same amount of insulin, you need to make sure that you eat the same amount of carbohydrate at meals. If you need more help to understand carbohydrate counting and food labels, ask your doctor, dietitian, or diabetes educator. How do you get started with meal planning? Here are some tips to get started:  · Plan your meals a week at a time. Don't forget to include snacks too. · Use cookbooks or online recipes to plan several main meals. Plan some quick meals for busy nights. You also can double some recipes that freeze well. Then you can save half for other busy nights when you don't have time to cook. · Make sure you have the ingredients you need for your recipes. If you're running low on basic items, put these items on your shopping list too. · List foods that you use to make breakfasts, lunches, and snacks. List plenty of fruits and vegetables. · Post this list on the refrigerator. Add to it as you think of more things you need. · Take the list to the store to do your weekly shopping. Follow-up care is a key part of your treatment and safety. Be sure to make and go to all appointments, and call your doctor if you are having problems. It's also a good idea to know your test results and keep a list of the medicines you take. Where can you learn more? Go to http://nohelia-martina.info/. Candie Ferrer in the search box to learn more about \"Learning About Meal Planning for Diabetes. \"  Current as of: December 7, 2017  Content Version: 11.8  © 3632-6911 [employer]. Care instructions adapted under license by [employer] (which disclaims liability or warranty for this information). If you have questions about a medical condition or this instruction, always ask your healthcare professional. [address] any warranty or liability for your use of this information.

## 2018-11-27 NOTE — PROGRESS NOTES
Diabetes (NEW DIAGNOSIS FOLLOW UP)        HPI: Lavonne Coyle is a 48 y.o. male [facility] here in follow-up of his recent laboratory studies. His A1c from November 14 was 6.6%. Patient has been gaining weight steadily despite use of weight loss medicines. He does not engage in regular exercise. He has previously been on diabetic medications in the distant past.  Not sure as to the name of that medication. He has also no elevated hemoglobin. He does smoke cigarettes regularly. It is also suspected that he has sleep apnea. He is awaiting to undergo a sleep apnea study. He has his abdominal CT scan scheduled for tomorrow. Past Medical History:   Diagnosis Date    Asthma     ED (erectile dysfunction)     GERD (gastroesophageal reflux disease)     Hematuria     Hypercholesterolemia 2010    Hyperlipidemia     Hypogonadism, male     Kidney stone     Morbid obesity (HCC)        Current Outpatient Medications   Medication Sig    metFORMIN ER (GLUCOPHAGE XR) 500 mg tablet Take 1 Tab by mouth daily (with dinner).  phentermine (ADIPEX-P) 37.5 mg tablet Take 1 Tab by mouth every morning. Max Daily Amount: 37.5 mg.    sildenafil citrate (VIAGRA) 50 mg tablet 1/2-1 tab as needed 30 minutes prior to sexual activity on empty stomach    rosuvastatin (CRESTOR) 20 mg tablet TAKE 1 TABLET NIGHTLY (DISCONTINUE LIPITOR, ALTERNATE THERAPY)    oxyCODONE-acetaminophen (PERCOCET) 5-325 mg per tablet Take 1-2 Tabs by mouth every six (6) hours as needed for Pain. Max Daily Amount: 8 Tabs.  omeprazole (PRILOSEC) 40 mg capsule TAKE 1 CAPSULE DAILY    metaxalone (SKELAXIN) 800 mg tablet TAKE 1 TABLET BY MOUTH THREE TIMES A DAY    budesonide-formoterol (SYMBICORT) 80-4.5 mcg/actuation HFAA Take 2 Puffs by inhalation two (2) times a day.     naproxen (NAPROSYN) 500 mg tablet TAKE 1 TABLET BY MOUTH TWO TIMES A DAY    triamcinolone acetonide (KENALOG) 0.5 % ointment Apply  to affected area two (2) times daily as needed for Skin Irritation. use thin layer    topiramate ER (TROKENDI XR) 50 mg capsule Take 1 Cap by mouth daily. No current facility-administered medications for this visit. Allergies   Allergen Reactions    Vicodin [Hydrocodone-Acetaminophen] Itching    Contrave [Naltrexone-Bupropion] Other (comments)     Depression       Past Surgical History:   Procedure Laterality Date    HX COLONOSCOPY  2015    4 polyps removed    HX HEENT      eye surgery as a child    HX ORTHOPAEDIC Left     left lateral meniscus amputated    HX UROLOGICAL Left 2017    [facility] Cysto, bilateral RPG, left URS and placement of left double J ureteral stent - Dr. Quinton Arredondo.  HX UROLOGICAL  2017    [facility]. Cystoscopy, left ureteroscopy, holmium laser lithotripsy and left double-J ureteral stent removal. Dr. Quinton Arredondo.        Family History   Problem Relation Age of Onset    Diabetes Mother     Stroke Mother     Arthritis-osteo Father     Hypertension Father     Arthritis-osteo Brother     Heart Attack Brother     Diabetes Maternal Grandmother        Social History     Socioeconomic History    Marital status:      Spouse name: Not on file    Number of children: Not on file    Years of education: Not on file    Highest education level: Not on file   Social Needs    Financial resource strain: Not on file    Food insecurity - worry: Not on file    Food insecurity - inability: Not on file   [employer] needs - medical: Not on file   [employer] needs - non-medical: Not on file   Occupational History    Occupation:    Tobacco Use    Smoking status: Current Every Day Smoker     Packs/day: 2.00     Years: 45.00     Pack years: 90.00     Types: Cigarettes     Start date: 1970     Last attempt to quit: 2016     Years since quittin.3    Smokeless tobacco: Never Used   Substance and Sexual Activity    Alcohol use: No     Alcohol/week: 0.0 oz    Drug use: No    Sexual activity: Not Currently   Other Topics Concern    Not on file   Social History Narrative    Not on file       Gen- No weight loss, No Malaise, No fatigue  Eyes- No dipoplia, blurry vision  CVS- No Chest pain, no palpitations, no edema  Resp- No Cough, SOB, MALAGON, Wheezing  Neuro- No headaches  Skin- No easy bruising, No rashes      Visit Vitals  /73 (BP 1 Location: Right arm, BP Patient Position: Sitting)   Pulse 72   Temp 98.1 °F (36.7 °C) (Oral)   Resp 18   Ht 6' 2\" (1.88 m)   Wt (!) 355 lb (161 kg)   SpO2 96%   BMI 45.58 kg/m²       GEN- NAD, AAOx3  CVS- RRR, +S1, +S2, No Murmurs, No JVD  PULM- CTABL, No W/R/R  EXT- No edema  NEURO-Normal Gait  PSYCH- Euthymic, normal afffect      Lab Results   Component Value Date/Time    Hemoglobin A1c 6.6 (H) 11/14/2018 09:10 AM    Hemoglobin A1c (POC) 5.8 01/03/2018 09:27 AM    Hemoglobin A1c, External 5.8 11/04/2015     Lab Results   Component Value Date/Time    WBC 8.0 11/14/2018 09:10 AM    HGB 18.3 (H) 11/14/2018 09:10 AM    HCT 55.6 (HH) 11/14/2018 09:10 AM    PLATELET 470 87/63/3215 09:10 AM    MCV 93.3 11/14/2018 09:10 AM       Assesment:  1. Type 2 diabetes mellitus without complication, without long-term current use of insulin ([address]  New diagnosis. Currently A1c is 6.6% we will start patient on metformin extended release 500 mg daily. Recheck A1c in 3 months    - HEMOGLOBIN A1C WITH EAG; Future    2. Elevated hemoglobin ([address]  Patient advised to donate blood the [place] due to elevated hemoglobin. Likely secondary to his chronic cigarette use. Will check iron studies today. Repeat CBC in 2-3 months.  - CBC W/O DIFF; Future  - IRON & IRON BINDING; Future  - FERRITIN; Future          I have discussed the diagnosis with the patient and the intended management  The patient has received an after-visit summary and questions were answered concerning future plans. I have discussed medication usage, side effects and warnings with the patient as well. I have reviewed the plan of care with the patient, accepted their input and they are in agreement with the treatment goals.          Follow-up Disposition: Not on File      Maty Mendez MD                .

## 2018-11-28 ENCOUNTER — HOSPITAL ENCOUNTER (OUTPATIENT)
Dept: CT IMAGING | Age: 53
Discharge: HOME | End: 2018-11-28
Attending: FAMILY MEDICINE
Payer: COMMERCIAL

## 2018-11-28 DIAGNOSIS — M62.08 DIASTASIS RECTI: ICD-10-CM

## 2018-11-28 PROCEDURE — 74150 CT ABDOMEN W/O CONTRAST: CPT

## 2018-12-03 ENCOUNTER — TELEPHONE (OUTPATIENT)
Dept: FAMILY MEDICINE CLINIC | Age: 53
End: 2018-12-03

## 2018-12-03 NOTE — TELEPHONE ENCOUNTER
Spoke with patient and made her aware that the form has been filled out. The provider has to sign it and so does the patient. Patient to come by today to sign the form. She voiced understanding.

## 2018-12-04 DIAGNOSIS — E66.01 MORBID OBESITY (HCC): ICD-10-CM

## 2019-01-02 ENCOUNTER — TELEPHONE (OUTPATIENT)
Dept: FAMILY MEDICINE CLINIC | Age: 54
End: 2019-01-02

## 2019-01-03 DIAGNOSIS — D75.1 POLYCYTHEMIA: Primary | ICD-10-CM

## 2019-01-08 NOTE — TELEPHONE ENCOUNTER
Patient wife called stating that [facility] in [facility] has not received the referral for patient's cpap machine. Patient is requesting that referral be faxed to .

## 2019-01-09 DIAGNOSIS — F51.04 CHRONIC INSOMNIA: Primary | ICD-10-CM

## 2019-01-22 ENCOUNTER — TELEPHONE (OUTPATIENT)
Dept: FAMILY MEDICINE CLINIC | Age: 54
End: 2019-01-22

## 2019-01-22 NOTE — TELEPHONE ENCOUNTER
Patient's wife called stating the he has not received his C-Pap machine as of yet. Requesting a call back for information.

## 2019-01-22 NOTE — TELEPHONE ENCOUNTER
I called [address]. They are just waiting on insurance verification. Patient's wife notified.

## 2019-02-04 ENCOUNTER — OFFICE VISIT (OUTPATIENT)
Dept: FAMILY MEDICINE CLINIC | Age: 54
End: 2019-02-04

## 2019-02-04 ENCOUNTER — HOSPITAL ENCOUNTER (OUTPATIENT)
Dept: LAB | Age: 54
Discharge: HOME | End: 2019-02-04

## 2019-02-04 VITALS
HEART RATE: 71 BPM | HEIGHT: 74 IN | WEIGHT: 315 LBS | DIASTOLIC BLOOD PRESSURE: 82 MMHG | OXYGEN SATURATION: 96 % | BODY MASS INDEX: 40.43 KG/M2 | RESPIRATION RATE: 16 BRPM | SYSTOLIC BLOOD PRESSURE: 134 MMHG | TEMPERATURE: 98 F

## 2019-02-04 DIAGNOSIS — E11.9 TYPE 2 DIABETES MELLITUS WITHOUT COMPLICATION, WITHOUT LONG-TERM CURRENT USE OF INSULIN (HCC): Primary | ICD-10-CM

## 2019-02-04 DIAGNOSIS — D75.1 POLYCYTHEMIA: ICD-10-CM

## 2019-02-04 DIAGNOSIS — E66.01 MORBID OBESITY (HCC): ICD-10-CM

## 2019-02-04 DIAGNOSIS — D58.2 ELEVATED HEMOGLOBIN (HCC): ICD-10-CM

## 2019-02-04 DIAGNOSIS — E11.9 TYPE 2 DIABETES MELLITUS WITHOUT COMPLICATION, WITHOUT LONG-TERM CURRENT USE OF INSULIN (HCC): ICD-10-CM

## 2019-02-04 DIAGNOSIS — M54.10 RADICULAR PAIN OF LEFT LOWER EXTREMITY: ICD-10-CM

## 2019-02-04 DIAGNOSIS — M54.50 LUMBAR PAIN: ICD-10-CM

## 2019-02-04 LAB
BASOPHILS # BLD: 0 K/UL (ref 0–0.1)
BASOPHILS NFR BLD: 1 % (ref 0–2)
DIFFERENTIAL METHOD BLD: ABNORMAL
EOSINOPHIL # BLD: 0.1 K/UL (ref 0–0.4)
EOSINOPHIL NFR BLD: 2 % (ref 0–5)
ERYTHROCYTE [DISTWIDTH] IN BLOOD BY AUTOMATED COUNT: 13.4 % (ref 11.6–14.5)
EST. AVERAGE GLUCOSE BLD GHB EST-MCNC: 126 MG/DL
FERRITIN SERPL-MCNC: 219 NG/ML (ref 8–388)
HBA1C MFR BLD: 6 % (ref 4.2–5.6)
HCT VFR BLD AUTO: 53.4 % (ref 36–48)
HGB BLD-MCNC: 17.6 G/DL (ref 13–16)
LYMPHOCYTES # BLD: 1.8 K/UL (ref 0.9–3.6)
LYMPHOCYTES NFR BLD: 21 % (ref 21–52)
MCH RBC QN AUTO: 30.4 PG (ref 24–34)
MCHC RBC AUTO-ENTMCNC: 33 G/DL (ref 31–37)
MCV RBC AUTO: 92.4 FL (ref 74–97)
MONOCYTES # BLD: 0.4 K/UL (ref 0.05–1.2)
MONOCYTES NFR BLD: 5 % (ref 3–10)
NEUTS SEG # BLD: 6.1 K/UL (ref 1.8–8)
NEUTS SEG NFR BLD: 71 % (ref 40–73)
PLATELET # BLD AUTO: 226 K/UL (ref 135–420)
PMV BLD AUTO: 10.7 FL (ref 9.2–11.8)
RBC # BLD AUTO: 5.78 M/UL (ref 4.7–5.5)
WBC # BLD AUTO: 8.5 K/UL (ref 4.6–13.2)

## 2019-02-04 PROCEDURE — 83540 ASSAY OF IRON: CPT

## 2019-02-04 PROCEDURE — 82728 ASSAY OF FERRITIN: CPT

## 2019-02-04 PROCEDURE — 81270 JAK2 GENE: CPT

## 2019-02-04 PROCEDURE — 82668 ASSAY OF ERYTHROPOIETIN: CPT

## 2019-02-04 PROCEDURE — 85025 COMPLETE CBC W/AUTO DIFF WBC: CPT

## 2019-02-04 PROCEDURE — 83550 IRON BINDING TEST: CPT

## 2019-02-04 PROCEDURE — 83036 HEMOGLOBIN GLYCOSYLATED A1C: CPT

## 2019-02-04 RX ORDER — LANCETS
EACH MISCELLANEOUS
Qty: 1 EACH | Refills: 11 | Status: SHIPPED | OUTPATIENT
Start: 2019-02-04 | End: 2022-07-29

## 2019-02-04 RX ORDER — INSULIN PUMP SYRINGE, 3 ML
EACH MISCELLANEOUS
Qty: 1 KIT | Refills: 0 | Status: SHIPPED | OUTPATIENT
Start: 2019-02-04 | End: 2022-07-29

## 2019-02-04 RX ORDER — PHENTERMINE HYDROCHLORIDE 37.5 MG/1
37.5 TABLET ORAL
Qty: 30 TAB | Refills: 2 | Status: SHIPPED | OUTPATIENT
Start: 2019-02-04 | End: 2019-06-26

## 2019-02-04 NOTE — PATIENT INSTRUCTIONS

## 2019-02-04 NOTE — PROGRESS NOTES
Jeimy Pierrepool is a 48 y.o. male  Her for diabetic follow up. Stated having pain every whwere. Worst pain In lower back.     Per patient he Has not increased Phentermine to 37.5 mg.

## 2019-02-05 DIAGNOSIS — J44.9 MODERATE COPD (CHRONIC OBSTRUCTIVE PULMONARY DISEASE) (HCC): ICD-10-CM

## 2019-02-05 LAB
EPO SERPL-ACNC: 5.1 MIU/ML (ref 2.6–18.5)
IRON SATN MFR SERPL: 23 % (ref 15–55)
IRON SERPL-MCNC: 85 UG/DL (ref 38–169)
TIBC SERPL-MCNC: 368 UG/DL (ref 250–450)
UIBC SERPL-MCNC: 283 UG/DL (ref 111–343)

## 2019-02-05 RX ORDER — ALBUTEROL SULFATE 90 UG/1
2 AEROSOL, METERED RESPIRATORY (INHALATION)
Qty: 1 INHALER | Refills: 1 | Status: SHIPPED | OUTPATIENT
Start: 2019-02-05 | End: 2019-04-08 | Stop reason: SDUPTHER

## 2019-02-05 RX ORDER — BUDESONIDE AND FORMOTEROL FUMARATE DIHYDRATE 80; 4.5 UG/1; UG/1
2 AEROSOL RESPIRATORY (INHALATION) 2 TIMES DAILY
Qty: 1 INHALER | Refills: 5 | Status: SHIPPED | OUTPATIENT
Start: 2019-02-05 | End: 2019-04-15 | Stop reason: SDUPTHER

## 2019-02-05 NOTE — TELEPHONE ENCOUNTER
Returned call to pt, she stated that the pharmacy did not have the blood glucose meter, I called the pharmacy to verify and they do have it ready, pt's wife made aware. Also, she states he refills on symbicort  and he needs refills on proair.

## 2019-02-05 NOTE — TELEPHONE ENCOUNTER
Patients wife is calling requesting to have one of the nurses call her back about some questions she has about her husbands medication. Please call back when free.

## 2019-02-05 NOTE — PROGRESS NOTES
Blood sugar problem (Follow up)        HPI: Michele Grant is a 48 y.o. male WHITE [place]  Here in follow-up of his diabetes mellitus. At his last visit in November. Started him on metformin 500 mg twice daily. He is using this as prescribed without complication. He does not check his blood sugars at home. As he does not have a glucometer. He does try to watch his carbohydrate intake. He he has history of chronic low back pain with radiation into the left hip. Left hip pain is more of a burning sensation. This has been present for several years. It seems to have been worsening over the last year. He reports he has feelings of weakness in the legs at times. He has fallen secondary to this. He denies any bladder/bowel dysfunction. He denies any saddle paresthesia. Past Medical History:   Diagnosis Date    Asthma     ED (erectile dysfunction)     GERD (gastroesophageal reflux disease)     Hematuria     Hypercholesterolemia 2010    Hyperlipidemia     Hypogonadism, male     Kidney stone     Morbid obesity (HCC)        Current Outpatient Medications   Medication Sig    phentermine (ADIPEX-P) 37.5 mg tablet Take 1 Tab by mouth every morning. Max Daily Amount: 37.5 mg.    Blood-Glucose Meter monitoring kit Check blood sugar fasting every other day    glucose blood VI test strips (ASCENSIA AUTODISC VI, ONE TOUCH ULTRA TEST VI) strip Use to check sugars every other day fasting.  lancets misc Use to check sugars every other day    topiramate ER (TROKENDI XR) 50 mg capsule Take 1 Cap by mouth daily.  metFORMIN ER (GLUCOPHAGE XR) 500 mg tablet Take 1 Tab by mouth daily (with dinner).  rosuvastatin (CRESTOR) 20 mg tablet TAKE 1 TABLET NIGHTLY (DISCONTINUE LIPITOR, ALTERNATE THERAPY)    omeprazole (PRILOSEC) 40 mg capsule TAKE 1 CAPSULE DAILY    budesonide-formoterol (SYMBICORT) 80-4.5 mcg/actuation HFAA Take 2 Puffs by inhalation two (2) times a day.     triamcinolone acetonide (KENALOG) 0.5 % ointment Apply  to affected area two (2) times daily as needed for Skin Irritation. use thin layer    sildenafil citrate (VIAGRA) 50 mg tablet 1/2-1 tab as needed 30 minutes prior to sexual activity on empty stomach    oxyCODONE-acetaminophen (PERCOCET) 5-325 mg per tablet Take 1-2 Tabs by mouth every six (6) hours as needed for Pain. Max Daily Amount: 8 Tabs.  metaxalone (SKELAXIN) 800 mg tablet TAKE 1 TABLET BY MOUTH THREE TIMES A DAY    naproxen (NAPROSYN) 500 mg tablet TAKE 1 TABLET BY MOUTH TWO TIMES A DAY     No current facility-administered medications for this visit. Allergies   Allergen Reactions    Vicodin [Hydrocodone-Acetaminophen] Itching    Contrave [Naltrexone-Bupropion] Other (comments)     Depression       Past Surgical History:   Procedure Laterality Date    HX COLONOSCOPY  12-    4 polyps removed    HX HEENT      eye surgery as a child    HX ORTHOPAEDIC Left 2003    left lateral meniscus amputated    HX UROLOGICAL Left 04/27/2017    [facility] Cysto, bilateral RPG, left URS and placement of left double J ureteral stent - Dr. Michelle Pearce.  HX UROLOGICAL  05/18/2017    [facility]. Cystoscopy, left ureteroscopy, holmium laser lithotripsy and left double-J ureteral stent removal. Dr. Michelle Pearce.        Family History   Problem Relation Age of Onset    Diabetes Mother     Stroke Mother     Arthritis-osteo Father     Hypertension Father     Arthritis-osteo Brother     Heart Attack Brother     Diabetes Maternal Grandmother        Social History     Socioeconomic History    Marital status:      Spouse name: Not on file    Number of children: Not on file    Years of education: Not on file    Highest education level: Not on file   Social Needs    Financial resource strain: Not on file    Food insecurity - worry: Not on file    Food insecurity - inability: Not on file   Duable Chinese needs - medical: Not on file   Duable Chinese needs - non-medical: Not on file   Occupational History    Occupation:    Tobacco Use    Smoking status: Current Every Day Smoker     Packs/day: 2.00     Years: 45.00     Pack years: 90.00     Types: Cigarettes     Start date: 1970     Last attempt to quit: 2016     Years since quittin.5    Smokeless tobacco: Never Used   Substance and Sexual Activity    Alcohol use: No     Alcohol/week: 0.0 oz    Drug use: No    Sexual activity: Not Currently   Other Topics Concern    Not on file   Social History Narrative    Not on file       Gen- No weight loss, No Malaise, No fatigue  Eyes- No dipoplia, blurry vision  CVS- No Chest pain, no palpitations, no edema  Resp- No Cough, SOB, MALAGON, Wheezing  Neuro- No headaches  Skin- No easy bruising, No rashes      Visit Vitals  /82   Pulse 71   Temp 98 °F (36.7 °C) (Oral)   Resp 16   Ht 6' 2\" (1.88 m)   Wt (!) 352 lb 12.8 oz (160 kg)   SpO2 96%   BMI 45.30 kg/m²       Physical Exam   Constitutional: He is oriented to person, place, and time. He appears well-developed and well-nourished. No distress. Obese   HENT:   Head: Normocephalic and atraumatic. Right Ear: External ear normal.   Left Ear: External ear normal.   Cardiovascular: Normal rate, regular rhythm and normal heart sounds. No murmur heard. Pulmonary/Chest: Effort normal and breath sounds normal. No stridor. No respiratory distress. He has no wheezes. Musculoskeletal:        Lumbar back: He exhibits decreased range of motion (Slight reduction in flexion and lateral flexion bilaterally. He does have pain during range of motion evaluation.) and tenderness (mild/moderate tenderness palpation of the right paraspinals adjacent to L3.). He exhibits no bony tenderness, no swelling, no deformity and no spasm. Straight leg raise positive on left at 25 degrees that this increases pain in the low back as well as increased pain into the left hip. Heel and toe walk intact.   Decrease strength of hip flexors bilaterally. Neurological: He is alert and oriented to person, place, and time. Skin: Skin is warm and dry. He is not diaphoretic. Psychiatric: He has a normal mood and affect. His behavior is normal. Thought content normal.           Assesment:  1. Type 2 diabetes mellitus without complication, without long-term current use of insulin ([facility])  Patient tolerating metformin. We will recheck hemoglobin A1c today. He has lost a few pounds since last visit. I have sent over prescription for glucometer and asked that he check his blood sugars every other day fasting.  - HEMOGLOBIN A1C WITH EAG; Future  - Blood-Glucose Meter monitoring kit; Check blood sugar fasting every other day  Dispense: 1 Kit; Refill: 0  - glucose blood VI test strips (ASCENSIA AUTODISC VI, ONE TOUCH ULTRA TEST VI) strip; Use to check sugars every other day fasting. Dispense: 100 Strip; Refill: 1  - lancets misc; Use to check sugars every other day  Dispense: 1 Each; Refill: 11    2. Lumbar pain  Patient with long-standing history of chronic low back pain. Per my personal interpretation of his lumbar x-ray completed in office today demonstrated mild arthritic changes without significant disc height loss. No fractures noted. Will await official x-ray results. - XR SPINE LUMB 2 OR 3 V; Future    3. Radicular pain of left lower extremity  Patient with symptoms consistent with radicular pain of the lumbar origin. Will obtain EMG for further evaluation. - EMG TWO EXTREMITIES LOWER; Future    4. Morbid obesity ([address]  I have refilled his Adipex. He has been using 30 mg daily with out side effect. We will increase to 37.5 mg once daily. - phentermine (ADIPEX-P) 37.5 mg tablet; Take 1 Tab by mouth every morning. Max Daily Amount: 37.5 mg.  Dispense: 30 Tab; Refill: 2    5. Elevated hemoglobin ([address]  Patient with history of elevated hemoglobin. Likely secondary to chronic tobacco use.   Will evaluate with iron studies as well as Arvind-2 mutation and erythropoietin labs  - CBC WITH AUTOMATED DIFF; Future    Follow-up Disposition:  Return for pending nerve conduction study.     Devendra Watson MD

## 2019-02-08 ENCOUNTER — DOCUMENTATION ONLY (OUTPATIENT)
Dept: FAMILY MEDICINE CLINIC | Age: 54
End: 2019-02-08

## 2019-02-08 LAB
BACKGROUND: 489207: NORMAL
DIRECTOR REVIEW: 489204: NORMAL
JAK2 P.V617F BLD/T QL: NORMAL

## 2019-02-08 NOTE — PROGRESS NOTES
Patient has been scheduled for his EMG with [facility] for 03/06/2019 at 2:30.

## 2019-02-18 DIAGNOSIS — G47.30 SLEEP APNEA IN ADULT: Primary | ICD-10-CM

## 2019-02-18 DIAGNOSIS — M54.50 BILATERAL LOW BACK PAIN WITHOUT SCIATICA, UNSPECIFIED CHRONICITY: ICD-10-CM

## 2019-02-20 RX ORDER — METAXALONE 800 MG/1
800 TABLET ORAL 3 TIMES DAILY
Qty: 90 TAB | Refills: 0 | Status: SHIPPED | OUTPATIENT
Start: 2019-02-20 | End: 2019-04-08 | Stop reason: SDUPTHER

## 2019-02-21 RX ORDER — METFORMIN HYDROCHLORIDE 500 MG/1
TABLET, EXTENDED RELEASE ORAL
Qty: 90 TAB | Refills: 0 | Status: SHIPPED | OUTPATIENT
Start: 2019-02-21 | End: 2019-04-08 | Stop reason: SDUPTHER

## 2019-03-01 RX ORDER — METFORMIN HYDROCHLORIDE 500 MG/1
TABLET, EXTENDED RELEASE ORAL
Qty: 90 TAB | Refills: 0 | OUTPATIENT
Start: 2019-03-01

## 2019-03-06 ENCOUNTER — OFFICE VISIT (OUTPATIENT)
Dept: FAMILY MEDICINE CLINIC | Age: 54
End: 2019-03-06

## 2019-03-06 VITALS
TEMPERATURE: 98.1 F | WEIGHT: 315 LBS | SYSTOLIC BLOOD PRESSURE: 123 MMHG | DIASTOLIC BLOOD PRESSURE: 75 MMHG | BODY MASS INDEX: 40.43 KG/M2 | HEIGHT: 74 IN | RESPIRATION RATE: 18 BRPM | HEART RATE: 71 BPM | OXYGEN SATURATION: 97 %

## 2019-03-06 DIAGNOSIS — G89.29 CHRONIC PAIN OF BOTH KNEES: ICD-10-CM

## 2019-03-06 DIAGNOSIS — M25.511 CHRONIC RIGHT SHOULDER PAIN: ICD-10-CM

## 2019-03-06 DIAGNOSIS — M54.42 CHRONIC MIDLINE LOW BACK PAIN WITH LEFT-SIDED SCIATICA: Primary | ICD-10-CM

## 2019-03-06 DIAGNOSIS — M25.562 CHRONIC PAIN OF BOTH KNEES: ICD-10-CM

## 2019-03-06 DIAGNOSIS — G89.29 CHRONIC MIDLINE LOW BACK PAIN WITH LEFT-SIDED SCIATICA: Primary | ICD-10-CM

## 2019-03-06 DIAGNOSIS — G89.29 CHRONIC RIGHT SHOULDER PAIN: ICD-10-CM

## 2019-03-06 DIAGNOSIS — M25.561 CHRONIC PAIN OF BOTH KNEES: ICD-10-CM

## 2019-03-06 DIAGNOSIS — L30.1 DYSHIDROTIC ECZEMA: ICD-10-CM

## 2019-03-06 RX ORDER — OXYCODONE AND ACETAMINOPHEN 5; 325 MG/1; MG/1
1 TABLET ORAL
Qty: 60 TAB | Refills: 0 | Status: SHIPPED | OUTPATIENT
Start: 2019-03-06 | End: 2019-06-26 | Stop reason: SDUPTHER

## 2019-03-06 RX ORDER — TRIAMCINOLONE ACETONIDE 5 MG/G
OINTMENT TOPICAL
Qty: 30 G | Refills: 0 | Status: SHIPPED | OUTPATIENT
Start: 2019-03-06 | End: 2019-06-26 | Stop reason: SDUPTHER

## 2019-03-06 NOTE — PROGRESS NOTES
Stephanie Mariscal is a 48 y.o. male here this morning to transfer care. He is also having back pain x 5 years that is progressively worse. He has an EMG today.

## 2019-03-06 NOTE — PATIENT INSTRUCTIONS
Joint Pain: Care Instructions  Your Care Instructions    Many people have small aches and pains from overuse or injury to muscles and joints. Joint injuries often happen during sports or recreation, work tasks, or projects around the home. An overuse injury can happen when you put too much stress on a joint or when you do an activity that stresses the joint over and over, such as using the computer or rowing a boat. You can take action at home to help your muscles and joints get better. You should feel better in 1 to 2 weeks, but it can take 3 months or more to heal completely. Follow-up care is a key part of your treatment and safety. Be sure to make and go to all appointments, and call your doctor if you are having problems. It's also a good idea to know your test results and keep a list of the medicines you take. How can you care for yourself at home? · Do not put weight on the injured joint for at least a day or two. · For the first day or two after an injury, do not take hot showers or baths, and do not use hot packs. The heat could make swelling worse. · Put ice or a cold pack on the sore joint for 10 to 20 minutes at a time. Try to do this every 1 to 2 hours for the next 3 days (when you are awake) or until the swelling goes down. Put a thin cloth between the ice and your skin. · Wrap the injury in an elastic bandage. Do not wrap it too tightly because this can cause more swelling. · Prop up the sore joint on a pillow when you ice it or anytime you sit or lie down during the next 3 days. Try to keep it above the level of your heart. This will help reduce swelling. · Take an over-the-counter pain medicine, such as acetaminophen (Tylenol), ibuprofen (Advil, Motrin), or naproxen (Aleve). Read and follow all instructions on the label. · After 1 or 2 days of rest, begin moving the joint gently.  While the joint is still healing, you can begin to exercise using activities that do not strain or hurt the painful joint. When should you call for help? Call your doctor now or seek immediate medical care if:    · You have signs of infection, such as:  ? Increased pain, swelling, warmth, and redness. ? Red streaks leading from the joint. ? A fever.    Watch closely for changes in your health, and be sure to contact your doctor if:    · Your movement or symptoms are not getting better after 1 to 2 weeks of home treatment. Where can you learn more? Go to http://nohelia-martina.info/. Enter P205 in the search box to learn more about \"Joint Pain: Care Instructions. \"  Current as of: September 20, 2018  Content Version: 11.9  © 7900-8328 [employer]. Care instructions adapted under license by [employer] (which disclaims liability or warranty for this information). If you have questions about a medical condition or this instruction, always ask your healthcare professional. [address] any warranty or liability for your use of this information.

## 2019-03-09 NOTE — PROGRESS NOTES
Today's Date:  3/9/2019   Patient:  Malachi Higgins  Patient :  1965    Subjective:     Chief Complaint   Patient presents with    Back Pain       Malachi Higgins is a 48 y.o. male who presents for follow up for Back Pain, Knee and shoulder pain and refill of medication. Used to see Dr. Shilpa Menchaca and is transfering his care to me. Chart reviewed. Has have this these problem for several years with periods of flare ups. States that symptoms have gotten wotten worse over the years. States that he intermittent weakness on the lower extremities causing falls. States that pain in his back ratiates to his left hip causing burning sensation. Use NSAIDs, muscle relaxer and Percocet. Uses the latter when pain is bad. Last prescription for Percocet was in October for #60 tab. Denies saddle paresthesia, Bowel and bladder problem. Has EMG of lower extremities scheduled for this afternoon. States that he went to Pain Management in th past but was let go because he did not use medication as written, everyday. He states that he does not need them everyday. Current Outpatient Meds and Allergies     Current Outpatient Medications on File Prior to Visit   Medication Sig Dispense Refill    metFORMIN ER (GLUCOPHAGE XR) 500 mg tablet TAKE 1 TABLET BY MOUTH DAILY WITH DINNER 90 Tab 0    naproxen (NAPROSYN) 500 mg tablet Take 1 Tab by mouth two (2) times daily (with meals). 60 Tab 2    metaxalone (SKELAXIN) 800 mg tablet Take 1 Tab by mouth three (3) times daily. 90 Tab 0    budesonide-formoterol (SYMBICORT) 80-4.5 mcg/actuation HFAA Take 2 Puffs by inhalation two (2) times a day. 1 Inhaler 5    albuterol (PROVENTIL HFA, VENTOLIN HFA, PROAIR HFA) 90 mcg/actuation inhaler Take 2 Puffs by inhalation every four (4) hours as needed for Wheezing. 1 Inhaler 1    phentermine (ADIPEX-P) 37.5 mg tablet Take 1 Tab by mouth every morning.  Max Daily Amount: 37.5 mg. 30 Tab 2    Blood-Glucose Meter monitoring kit Check blood sugar fasting every other day 1 Kit 0    glucose blood VI test strips (ASCENSIA AUTODISC VI, ONE TOUCH ULTRA TEST VI) strip Use to check sugars every other day fasting. 100 Strip 1    lancets misc Use to check sugars every other day 1 Each 11    topiramate ER (TROKENDI XR) 50 mg capsule Take 1 Cap by mouth daily. 30 Cap 2    sildenafil citrate (VIAGRA) 50 mg tablet 1/2-1 tab as needed 30 minutes prior to sexual activity on empty stomach 10 Tab 2    rosuvastatin (CRESTOR) 20 mg tablet TAKE 1 TABLET NIGHTLY (DISCONTINUE LIPITOR, ALTERNATE THERAPY) 90 Tab 2    omeprazole (PRILOSEC) 40 mg capsule TAKE 1 CAPSULE DAILY 90 Cap 2     No current facility-administered medications on file prior to visit. These medications have been reviewed and reconciled with the patient during today's visit. Allergies   Allergen Reactions    Vicodin [Hydrocodone-Acetaminophen] Itching    Contrave [Naltrexone-Bupropion] Other (comments)     Depression         ROS:     CONST:   Denies fatigue, weight change, appetite change   NEURO:   Denies headaches, vision changes, dizziness, loss of consciousness  CV:      Denies chest pain, palpitations, orthopnea, PND  PULM:  Denies SOB, wheezing, cough, hemoptysis  GI:             Denies nausea, vomiting, abdominal pain, greasy stools, blood in stool, diarrhea, constipation  :       Denies dysuria, hematuria, change in urine  MS:      muscle/joint pain, no joint swelling  SKIN:        Denies rashes, skin changes  PSYCH: No agitation, confusion/disorientation, suicidal or homicidal ideation. Objective:     VS:    Visit Vitals  /75 (BP 1 Location: Right arm, BP Patient Position: Sitting)   Pulse 71   Temp 98.1 °F (36.7 °C) (Oral)   Resp 18   Ht 6' 2\" (1.88 m)   Wt (!) 351 lb (159.2 kg)   SpO2 97%   BMI 45.07 kg/m²       General:   Well-nourished, well-groomed, pleasant, alert, in no acute distress.      Cardiovasc:   Regular rate and rhythm, no murmurs, no rubs, no gallops, Pulmonary:   Clear breath sounds bilaterally, good air movement, no wheezing, no rales, no rhonchi, normal respiratory effort  MS:   + tenderness of left hip; No edema or redness, warm and well-perfused  Neuro:   Alert, conversant, appropriate, following commands, no focal deficits. Psychiatric: Oriented to time, place and person. Normal mood, no agitation or anxiety. Normal affect. Pleasant and cooperative. Pertinent diagnostic procedures include:  Hospital Outpatient Visit on 02/04/2019   Component Date Value Ref Range Status    JAK2 Mutation Analysis 02/04/2019 Comment    Final    Comment: (NOTE)  Result: NEGATIVE for the JAK2 V617F mutation. Interpretation:  The G to T nucleotide change encoding the V617F  mutation was not detected. This result does not rule out the  presence of the JAK2 mutation at a level below the sensitivity of  detection of this assay, or the presence of other mutations within  JAK2 not detected by this assay. This result does not rule out a  diagnosis of polycythemia vera, essential thrombocythemia or  idiopathic myelofibrosis as the V617F mutation is not detected in all  patients with these disorders.  Director Review 02/04/2019 Comment    Final    Comment: (NOTE)  Dave Kurtz, PhD, [facility] [employer].                Director, [address] [place] [address]                [phone]  This test was developed and its performance characteristics  determined by [facility]. It has not been cleared or approved  by the Food and Drug Administration.   Performed At: [facility]  [address], [place] [id number]  Jo Apple MD GF:[id number]  Performed At: [facility]  [address], [place] [id number]  Jo Apple MD BR:[id number]      Background 02/04/2019 Comment    Final    Comment: (NOTE)  JAK2 is a cytoplasmic tyrosine kinase with a key role in signal  transduction from multiple hematopoietic growth factor receptors. A  point mutation within exon 14 of the JAK2 gene (R4616I) encoding a  valine to phenylalanine substitution at position 617 of the JAK2  protein (V617F) has been identified in most patients with  polycythemia vera, and in about half of those with either essential  thrombocythemia or idiopathic myelofibrosis. The V617F has also been  detected, although infrequently, in other myeloid disorders such as  chronic myelomonocytic leukemia and chronic neutrophilic luekemia. V617F is an acquired mutation that alters a highly conserved valine  present in the negative regulatory JH2 domain of the JAK2 protein and  is predicted to dysregulate kinase activity. Methodology: Total genomic DNA was extracted and subjected to TaqMan real-time  PCR amplification/detection. Two amplification products per sample  were monitored by real-time PCR using primers/probes s                           pecific to JAK2  wild type (WT) and JAK2 mutant V617F. The [employer]  Quantitation software will compare the patient specimen valuse to the  standard curves and generate percent values for wild type and mutant  type. In vitro studies have indicated that this assay has an  analytical sensitivity of 1%. References:  Josh PAGAN, Aj Daniels, et al. Acquired mutation of the  tyrosine kinase JAK2 in human myeloproliferative disorders. Lancet. 2005 Mar 19-25; 365(1856):8312-0651. Sher Sánchez JP. A unique clonal JAK2 mutation  leading to constitutive signaling causes polycythaemia vera. Nature. 2005 Apr 28; 104(7377):1931-2902. Vicki R, Kalpana F, Andrey AS, et al. A gain-of-function  mutation of JAK2 in myeloproliferative disorders. N Engl J Med.  2005 Apr 28; 35217):6398-6119.       Erythropoietin 02/04/2019 5.1  2.6 - 18.5 mIU/mL Final    Comment: (NOTE)  [employer] D[employer] [address] [place] obtained with different assay methods or kits cannot be used  interchangeably. Results cannot be interpreted as absolute evidence  of the presence or absence of malignant disease. Performed At: [facility]  [address] [place] [id number]  Wiley Ulloa MD [id number]      WBC 02/04/2019 8.5  4.6 - 13.2 K/uL Final    RBC 02/04/2019 5.78* 4.70 - 5.50 M/uL Final    HGB 02/04/2019 17.6* 13.0 - 16.0 g/dL Final    HCT 02/04/2019 53.4* 36.0 - 48.0 % Final    MCV 02/04/2019 92.4  74.0 - 97.0 FL Final    MCH 02/04/2019 30.4  24.0 - 34.0 PG Final    MCHC 02/04/2019 33.0  31.0 - 37.0 g/dL Final    RDW 02/04/2019 13.4  11.6 - 14.5 % Final    PLATELET 57/33/6257 189  135 - 420 K/uL Final    MPV 02/04/2019 10.7  9.2 - 11.8 FL Final    NEUTROPHILS 02/04/2019 71  40 - 73 % Final    LYMPHOCYTES 02/04/2019 21  21 - 52 % Final    MONOCYTES 02/04/2019 5  3 - 10 % Final    EOSINOPHILS 02/04/2019 2  0 - 5 % Final    BASOPHILS 02/04/2019 1  0 - 2 % Final    ABS. NEUTROPHILS 02/04/2019 6.1  1.8 - 8.0 K/UL Final    ABS. LYMPHOCYTES 02/04/2019 1.8  0.9 - 3.6 K/UL Final    ABS. MONOCYTES 02/04/2019 0.4  0.05 - 1.2 K/UL Final    ABS. EOSINOPHILS 02/04/2019 0.1  0.0 - 0.4 K/UL Final    ABS.  BASOPHILS 02/04/2019 0.0  0.0 - 0.1 K/UL Final    DF 02/04/2019 AUTOMATED    Final    TIBC 02/04/2019 368  250 - 450 ug/dL Final    UIBC 02/04/2019 283  111 - 343 ug/dL Final    Iron 02/04/2019 85  38 - 169 ug/dL Final    Iron % saturation 02/04/2019 23  15 - 55 % Final    Comment: (NOTE)  Performed At: [facility] [address] [id number]  Wiley Ulloa MD [id number]      Ferritin 02/04/2019 219  8 - 388 NG/ML Final    Hemoglobin A1c 02/04/2019 6.0* 4.2 - 5.6 % Final    Comment: (NOTE)  HbA1C Interpretive Ranges  <5.7              Normal  5.7 - 6.4         Consider Prediabetes  >6.5              Consider Diabetes      Est. average glucose 02/04/2019 126  mg/dL Final    Comment: (NOTE)  The eAG should be interpreted with patient characteristics in mind   since ethnicity, interindividual differences, red cell lifespan,   variation in rates of glycation, etc. may affect the validity of the   calculation. Assessment/Plan:     1. Chronic midline low back pain with left-sided sciatica  - Follow up with EMG today. - Based on result will consider referring to Neurosurgery. - oxyCODONE-acetaminophen (PERCOCET) 5-325 mg per tablet; Take 1 Tab by mouth every eight (8) hours as needed for Pain for up to 30 days. Max Daily Amount: 3 Tabs. Dispense: 60 Tab; Refill: 0    2. Chronic pain of both knees  - oxyCODONE-acetaminophen (PERCOCET) 5-325 mg per tablet; Take 1 Tab by mouth every eight (8) hours as needed for Pain for up to 30 days. Max Daily Amount: 3 Tabs. Dispense: 60 Tab; Refill: 0    3. Chronic right shoulder pain  - oxyCODONE-acetaminophen (PERCOCET) 5-325 mg per tablet; Take 1 Tab by mouth every eight (8) hours as needed for Pain for up to 30 days. Max Daily Amount: 3 Tabs. Dispense: 60 Tab; Refill: 0    4. Dyshidrotic eczema  - triamcinolone acetonide (KENALOG) 0.5 % ointment; Apply  to affected area two (2) times daily as needed for Skin Irritation. use thin layer  Dispense: 30 g; Refill:      reviewed no inappropriate meds/behavior observed  I have discussed the diagnosis with the patient and the intended plan as seen in the above orders. The patient has received an after-visit summary along with patient information handout. I have discussed medication side effects and warnings with the patient as well. Pt verbalized understanding. Follow-up Disposition:  Return in about 3 months (around 6/6/2019).   GUILHERME Perez  3/9/2019, 3:57 PM

## 2019-03-10 NOTE — PATIENT INSTRUCTIONS
Keep use heating pad and squeeze edges of abscess. Take out packing on Monday. Skin Abscess: Care Instructions  Your Care Instructions    A skin abscess is a bacterial infection that forms a pocket of pus. A boil is a kind of skin abscess. The doctor may have cut an opening in the abscess so that the pus can drain out. You may have gauze in the cut so that the abscess will stay open and keep draining. You may need antibiotics. You will need to follow up with your doctor to make sure the infection has gone away. The doctor has checked you carefully, but problems can develop later. If you notice any problems or new symptoms, get medical treatment right away. Follow-up care is a key part of your treatment and safety. Be sure to make and go to all appointments, and call your doctor if you are having problems. It's also a good idea to know your test results and keep a list of the medicines you take. How can you care for yourself at home? · Apply warm and dry compresses, a heating pad set on low, or a hot water bottle 3 or 4 times a day for pain. Keep a cloth between the heat source and your skin. · If your doctor prescribed antibiotics, take them as directed. Do not stop taking them just because you feel better. You need to take the full course of antibiotics. · Take pain medicines exactly as directed. ¨ If the doctor gave you a prescription medicine for pain, take it as prescribed. ¨ If you are not taking a prescription pain medicine, ask your doctor if you can take an over-the-counter medicine. · Keep your bandage clean and dry. Change the bandage whenever it gets wet or dirty, or at least one time a day. · If the abscess was packed with gauze:  ¨ Keep follow-up appointments to have the gauze changed or removed. If the doctor instructed you to remove the gauze, gently pull out all of the gauze when your doctor tells you to.   ¨ After the gauze is removed, soak the area in warm water for 15 to 20 minutes 2 times a day, until the wound closes. When should you call for help? Call your doctor now or seek immediate medical care if:  · You have signs of worsening infection, such as:  ¨ Increased pain, swelling, warmth, or redness. ¨ Red streaks leading from the infected skin. ¨ Pus draining from the wound. ¨ A fever. Watch closely for changes in your health, and be sure to contact your doctor if:  · You do not get better as expected. Where can you learn more? Go to http://nohelia-martina.info/. Enter X308 in the search box to learn more about \"Skin Abscess: Care Instructions. \"  Current as of: February 5, 2016  Content Version: 11.1  © 5280-1539 [employer]. Care instructions adapted under license by [employer] (which disclaims liability or warranty for this information). If you have questions about a medical condition or this instruction, always ask your healthcare professional. [place] [address] any warranty or liability for your use of this information. normal... Well appearing, well nourished, awake, alert, oriented to person, place, time/situation and in no apparent distress.

## 2019-03-14 ENCOUNTER — TELEPHONE (OUTPATIENT)
Dept: FAMILY MEDICINE CLINIC | Age: 54
End: 2019-03-14

## 2019-03-14 DIAGNOSIS — G89.29 CHRONIC MIDLINE LOW BACK PAIN WITH LEFT-SIDED SCIATICA: ICD-10-CM

## 2019-03-14 DIAGNOSIS — G47.30 SLEEP APNEA, UNSPECIFIED TYPE: Primary | ICD-10-CM

## 2019-03-14 DIAGNOSIS — M54.42 CHRONIC MIDLINE LOW BACK PAIN WITH LEFT-SIDED SCIATICA: ICD-10-CM

## 2019-03-14 NOTE — TELEPHONE ENCOUNTER
Patient spouse called to see if provider thinks that her  would benefit from a CT or MRI of his back being that the results of the EMG are negative. She is also wanting to know what's going on with his C-Pap machine. Spouse states that they got a message in [place] for a \"refill\" of his machine, but they have no machine. Please advise.

## 2019-03-15 NOTE — TELEPHONE ENCOUNTER
Spoke with patient's wife and made her aware that an MRI has been ordered and faxed to [facility] and his orders for a CPAP has been faxed to [address] [place] [address]. She voiced understanding.

## 2019-04-03 ENCOUNTER — TELEPHONE (OUTPATIENT)
Dept: FAMILY MEDICINE CLINIC | Age: 54
End: 2019-04-03

## 2019-04-03 DIAGNOSIS — M51.36 DDD (DEGENERATIVE DISC DISEASE), LUMBAR: ICD-10-CM

## 2019-04-03 DIAGNOSIS — G89.29 CHRONIC MIDLINE LOW BACK PAIN WITH LEFT-SIDED SCIATICA: Primary | ICD-10-CM

## 2019-04-03 DIAGNOSIS — M54.42 CHRONIC MIDLINE LOW BACK PAIN WITH LEFT-SIDED SCIATICA: Primary | ICD-10-CM

## 2019-04-03 DIAGNOSIS — E66.01 MORBID OBESITY (HCC): ICD-10-CM

## 2019-04-03 RX ORDER — PHENTERMINE HYDROCHLORIDE 37.5 MG/1
37.5 TABLET ORAL
Qty: 30 TAB | Refills: 2 | Status: CANCELLED | OUTPATIENT
Start: 2019-04-03

## 2019-04-08 DIAGNOSIS — M54.50 BILATERAL LOW BACK PAIN WITHOUT SCIATICA, UNSPECIFIED CHRONICITY: ICD-10-CM

## 2019-04-09 RX ORDER — ALBUTEROL SULFATE 90 UG/1
AEROSOL, METERED RESPIRATORY (INHALATION)
Qty: 8.5 INHALER | Refills: 1 | Status: SHIPPED | OUTPATIENT
Start: 2019-04-09 | End: 2019-04-09 | Stop reason: SDUPTHER

## 2019-04-09 RX ORDER — METAXALONE 800 MG/1
800 TABLET ORAL 3 TIMES DAILY
Qty: 90 TAB | Refills: 0 | Status: SHIPPED | OUTPATIENT
Start: 2019-04-09 | End: 2019-11-29

## 2019-04-09 RX ORDER — ALBUTEROL SULFATE 90 UG/1
2 AEROSOL, METERED RESPIRATORY (INHALATION)
Qty: 1 INHALER | Refills: 1 | Status: SHIPPED | OUTPATIENT
Start: 2019-04-09 | End: 2019-07-12 | Stop reason: SDUPTHER

## 2019-04-09 RX ORDER — METFORMIN HYDROCHLORIDE 500 MG/1
TABLET, EXTENDED RELEASE ORAL
Qty: 90 TAB | Refills: 0 | Status: SHIPPED | OUTPATIENT
Start: 2019-04-09 | End: 2019-06-04 | Stop reason: SDUPTHER

## 2019-04-11 DIAGNOSIS — G89.29 CHRONIC MIDLINE LOW BACK PAIN WITH LEFT-SIDED SCIATICA: ICD-10-CM

## 2019-04-11 DIAGNOSIS — M54.42 CHRONIC MIDLINE LOW BACK PAIN WITH LEFT-SIDED SCIATICA: ICD-10-CM

## 2019-04-11 DIAGNOSIS — M54.10 RADICULAR PAIN OF LEFT LOWER EXTREMITY: ICD-10-CM

## 2019-04-15 DIAGNOSIS — J44.9 MODERATE COPD (CHRONIC OBSTRUCTIVE PULMONARY DISEASE) (HCC): ICD-10-CM

## 2019-04-15 RX ORDER — BUDESONIDE AND FORMOTEROL FUMARATE DIHYDRATE 80; 4.5 UG/1; UG/1
2 AEROSOL RESPIRATORY (INHALATION) 2 TIMES DAILY
Qty: 1 INHALER | Refills: 5 | Status: SHIPPED | OUTPATIENT
Start: 2019-04-15 | End: 2019-09-30 | Stop reason: SDUPTHER

## 2019-04-16 ENCOUNTER — OFFICE VISIT (OUTPATIENT)
Dept: ORTHOPEDIC SURGERY | Age: 54
End: 2019-04-16

## 2019-04-16 VITALS
TEMPERATURE: 97.4 F | SYSTOLIC BLOOD PRESSURE: 133 MMHG | BODY MASS INDEX: 40.43 KG/M2 | WEIGHT: 315 LBS | HEART RATE: 80 BPM | HEIGHT: 74 IN | RESPIRATION RATE: 16 BRPM | OXYGEN SATURATION: 97 % | DIASTOLIC BLOOD PRESSURE: 84 MMHG

## 2019-04-16 DIAGNOSIS — M13.0 POLYARTICULAR ARTHRITIS: Primary | ICD-10-CM

## 2019-04-16 DIAGNOSIS — M54.2 CERVICAL PAIN: ICD-10-CM

## 2019-04-16 DIAGNOSIS — M62.830 MUSCLE SPASM OF BACK: ICD-10-CM

## 2019-04-16 NOTE — PROGRESS NOTES
[address]  [address], [address]  [place]  Phone: [phone]  Fax: [phone]  INITIAL CONSULTATION  Patient: Michele Logn                MRN: [id number]       SSN: [id number]  YOB: 1965        AGE: 48 y.o. SEX: male  Body mass index is 45.71 kg/m². PCP: GUILHERME Goddard  04/16/19    Chief Complaint   Patient presents with    Back Pain     Lumbar    Buttocks pain     Bilateral    Hip Pain     Bilateral    Leg Pain     Bilateral    New Patient     Surgery Consult         HISTORY OF PRESENT ILLNESS, RADIOGRAPHS, and PLAN:         HISTORY OF PRESENT ILLNESS:   Mr. Luz Ferraro is seen today at the request of Dr. German Gonzalez. Mr. 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 He has had progressive for four years pain in the entire axial skeleton neck to low back. It affects his hips, back, sharp, burning pain, numbness in his hands, truly disabling pain, constant, 4-8 in intensity. Weight has fluctuated between 275 and 375 lb. Attempts to lose weight. Has has a family history of arthritis. He denies bladder or bowel dysfunction, fevers, chills or night sweats. PHYSICAL EXAMINATION:   Overweight gentleman, 350+ pounds. Neurologically intact. Normal strength, sensation and reflexes. MRI of the lumbar spine demonstrates no stenosis or instability, degenerative changes evident. ASSESSMENT/PLAN:   My overall sense is that he has just some sort of connective tissue disorder. This may be osteoarthritic but it is primarily his axial skeleton. He has good range of motion of his neck I do not think this is ankylosing spondylitis. We are going to get some blood work on him, an MRI of his cervical spine, plain x-rays and try to have an evalution. May need to have him seen by rheumatology. Will have him seen by bariatrics.   He is a difficult problem. I do not see any surgical pathology in his spine at this moment. cc:    Dr. Telma Lezama Cervical, 2V Lumbar Xray Next Visit    Past Medical History:   Diagnosis Date    Asthma     ED (erectile dysfunction)     GERD (gastroesophageal reflux disease)     Hematuria     Hypercholesterolemia 2010    Hyperlipidemia     Hypogonadism, male     Kidney stone     Morbid obesity ([address]        Family History   Problem Relation Age of Onset    Diabetes Mother     Stroke Mother     Arthritis-osteo Father     Hypertension Father     Arthritis-osteo Brother     Heart Attack Brother     Diabetes Maternal Grandmother        Current Outpatient Medications   Medication Sig Dispense Refill    budesonide-formoterol (SYMBICORT) 80-4.5 mcg/actuation HFAA Take 2 Puffs by inhalation two (2) times a day. 1 Inhaler 5    metaxalone (SKELAXIN) 800 mg tablet Take 1 Tab by mouth three (3) times daily. 90 Tab 0    metFORMIN ER (GLUCOPHAGE XR) 500 mg tablet TAKE 1 TABLET BY MOUTH DAILY WITH DINNER 90 Tab 0    albuterol (PROVENTIL HFA, VENTOLIN HFA, PROAIR HFA) 90 mcg/actuation inhaler Take 2 Puffs by inhalation every four (4) hours as needed for Wheezing. 1 Inhaler 1    cpap machine kit Use nightly for Sleep Apnea. Duration of a lifetime. 1 Kit 0    cpap machine kit by Does Not Apply route. Use nightly for Sleep Apnea. 1 Kit 0    triamcinolone acetonide (KENALOG) 0.5 % ointment Apply  to affected area two (2) times daily as needed for Skin Irritation. use thin layer 30 g 0    naproxen (NAPROSYN) 500 mg tablet Take 1 Tab by mouth two (2) times daily (with meals). 60 Tab 2    phentermine (ADIPEX-P) 37.5 mg tablet Take 1 Tab by mouth every morning.  Max Daily Amount: 37.5 mg. 30 Tab 2    Blood-Glucose Meter monitoring kit Check blood sugar fasting every other day 1 Kit 0    glucose blood VI test strips (ASCENSIA AUTODISC VI, ONE TOUCH ULTRA TEST VI) strip Use to check sugars every other day fasting. 100 Strip 1    lancets misc Use to check sugars every other day 1 Each 11    topiramate ER (TROKENDI XR) 50 mg capsule Take 1 Cap by mouth daily. 30 Cap 2    sildenafil citrate (VIAGRA) 50 mg tablet 1/2-1 tab as needed 30 minutes prior to sexual activity on empty stomach 10 Tab 2    rosuvastatin (CRESTOR) 20 mg tablet TAKE 1 TABLET NIGHTLY (DISCONTINUE LIPITOR, ALTERNATE THERAPY) 90 Tab 2    omeprazole (PRILOSEC) 40 mg capsule TAKE 1 CAPSULE DAILY 90 Cap 2       Allergies   Allergen Reactions    Vicodin [Hydrocodone-Acetaminophen] Itching    Contrave [Naltrexone-Bupropion] Other (comments)     Depression       Past Surgical History:   Procedure Laterality Date    HX COLONOSCOPY  12-    4 polyps removed    HX HEENT      eye surgery as a child    HX ORTHOPAEDIC Left 2003    left lateral meniscus amputated    HX UROLOGICAL Left 04/27/2017    [facility] Cysto, bilateral RPG, left URS and placement of left double J ureteral stent - Dr. Sweetie Currie.  HX UROLOGICAL  05/18/2017    [facility]. Cystoscopy, left ureteroscopy, holmium laser lithotripsy and left double-J ureteral stent removal. Dr. Sweetie Currie.        Past Medical History:   Diagnosis Date    Asthma     ED (erectile dysfunction)     GERD (gastroesophageal reflux disease)     Hematuria     Hypercholesterolemia 2010    Hyperlipidemia     Hypogonadism, male     Kidney stone     Morbid obesity ([address]        Social History     Socioeconomic History    Marital status:      Spouse name: Not on file    Number of children: Not on file    Years of education: Not on file    Highest education level: Not on file   Occupational History    Occupation:    Social Needs    Financial resource strain: Not on file    Food insecurity:     Worry: Not on file     Inability: Not on file    Transportation needs:     Medical: Not on file     Non-medical: Not on file   Tobacco Use    Smoking status: Current Every Day Smoker     Packs/day: 2.00 Years: 45.00     Pack years: 90.00     Types: Cigarettes     Start date: 1970     Last attempt to quit: 2016     Years since quittin.7    Smokeless tobacco: Never Used   Substance and Sexual Activity    Alcohol use: No     Alcohol/week: 0.0 oz    Drug use: No    Sexual activity: Not Currently   Lifestyle    Physical activity:     Days per week: Not on file     Minutes per session: Not on file    Stress: Not on file   Relationships    Social connections:     Talks on phone: Not on file     Gets together: Not on file     Attends Latter day service: Not on file     Active member of club or organization: Not on file     Attends meetings of clubs or organizations: Not on file     Relationship status: Not on file    Intimate partner violence:     Fear of current or ex partner: Not on file     Emotionally abused: Not on file     Physically abused: Not on file     Forced sexual activity: Not on file   Other Topics Concern    Not on file   Social History Narrative    Not on file           REVIEW OF SYSTEMS:   CONSTITUTIONAL SYMPTOMS:  Negative. EYES:  Negative. EARS, NOSE, THROAT AND MOUTH:  Negative. CARDIOVASCULAR:  Negative. RESPIRATORY:  Negative. GENITOURINARY: Per HPI. GASTROINTESTINAL:  Per HPI. INTEGUMENTARY (SKIN AND/OR BREAST):  Negative. MUSCULOSKELETAL: Per HPI.   ENDOCRINE/RHEUMATOLOGIC:  Negative. NEUROLOGICAL:  Per HPI. HEMATOLOGIC/LYMPHATIC:  Negative. ALLERGIC/IMMUNOLOGIC:  Negative. PSYCHIATRIC:  Negative. PHYSICAL EXAMINATION:   Visit Vitals  /84   Pulse 80   Temp 97.4 °F (36.3 °C)   Resp 16   Ht 6' 2\" (1.88 m)   Wt (!) 356 lb (161.5 kg)   SpO2 97%   BMI 45.71 kg/m²    PAIN SCALE: 4/10    CONSTITUTIONAL: The patient is in no apparent distress and is alert and oriented x 3. HEENT: Normocephalic. Hearing grossly intact. NECK: Supple and symmetric. no tenderness, or masses were felt. RESPIRATORY: No labored breathing.   CARDIOVASCULAR: The carotid pulses were normal. Peripheral pulses were 2+. CHEST: Normal AP diameter and normal contour without any kyphoscoliosis. LYMPHATIC: No lymphadenopathy was appreciated in the neck, axillae or groin. SKIN:  Negative for scars, rashes, lesions, or ulcers on the right upper, right lower, left upper, left lower and trunk. NEUROLOGICAL: Alert and oriented x 3. Ambulation without assistive device. FWB. Imbalance. EXTREMITIES:  See musculoskeletal.  MUSCULOSKELETAL:   Head and Neck:  Negative for misalignment, asymmetry, crepitation, defects, tenderness masses or effusions.  Left Upper Extremity: Numbenss in hand. Inspection, percussion and palpation performed. Websters sign is negative.  Right Upper Extremity: Inspection, percussion and palpation performed. Websters sign is negative.  Spine, Ribs and Pelvis: Back and BLE pain. Inspection, percussion and palpation performed. Negative for misalignment, asymmetry, crepitation, defects, tenderness masses or effusions.  Left Lower Extremity: Numbness, weakness, and pain. L >R. Inspection, percussion and palpation performed. Negative straight leg raise.  Right Lower Extremity: Numbness, weakness, and pain. Inspection, percussion and palpation performed. Negative straight leg raise. SPINE EXAM:     Cervical spine: Neck is midline. Normal muscle tone. No focal atrophy is noted. Lumbar spine: No rash, ecchymosis, or gross obliquity. No focal atrophy is noted. ASSESSMENT    ICD-10-CM ICD-9-CM    1. 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 Muscle spasm of back M62.830 724.8    3. Cervical pain M54.2 723.1 MRI CERV SPINE WO CONT   4. Body mass index (BMI) 45.0-49.9, adult [facility]) Z68.42 V85.42 REFERRAL TO BARIATRIC SURGERY       Written by Charlotte Henry, as dictated by Wing Yancey MD.    I, Dr. Wing Yancey MD, confirm that all documentation is accurate.

## 2019-04-18 ENCOUNTER — DOCUMENTATION ONLY (OUTPATIENT)
Dept: ORTHOPEDIC SURGERY | Age: 54
End: 2019-04-18

## 2019-04-18 NOTE — PROGRESS NOTES
Order and office notes faxed to [facility] Scheduling to have them set up MRI C-Spine and to notify the patient of date. They will authorize with the insurance if needed. Patient aware.

## 2019-04-25 ENCOUNTER — DOCUMENTATION ONLY (OUTPATIENT)
Dept: ORTHOPEDIC SURGERY | Age: 54
End: 2019-04-25

## 2019-04-25 NOTE — PROGRESS NOTES
MRI C-Spine needs P2P, see previous note      Just spoke to this patients wife Audrey (ok per HIPAA) about ins denial for Dr. Rudolph Florez MRI order. She says Kathy gave her a phone # for peer to peer if you dont already have it, [phone]. I put a documentation in [address] [place] [address] left a contact phone number for herself of [phone] if you need her.   [facility] ID # [id number]

## 2019-04-25 NOTE — PROGRESS NOTES
Patients wife, Ursula Dunn (ok per HIPAA), called to report that [facility] has denied the MRI, however, will reconsider with peer to peer. She also states that patient has not done lab work yet as he was hoping to have all done at the same time. She's not sure if we're aware that patient has also been in pain management recently. Kathy was supposed to have sent us a letter already regarding the denial.  Ursula Mona was advised this information will be forwarded to diagnostic  for review.

## 2019-04-26 NOTE — PROGRESS NOTES
Denied. Patient has not completed Cervical PT nor has he been on medication such as gabapentin per the note. If we find out that he has been treated with Pt or meds, could call back with this info.  But for now would need 6 weeks of conservative treatment first.

## 2019-04-26 NOTE — PROGRESS NOTES
Spoke with patient's wife, they will call the insurance company to find out about doing an appeal with the insurance for the MRI. He does not feel he can do the PT . He is not been able to work in 2 weeks and just wants to do the MRI to find out what is wrong. She will get back in touch after finding out something from the insurance.

## 2019-04-29 DIAGNOSIS — M54.2 CERVICAL PAIN: Primary | ICD-10-CM

## 2019-05-08 ENCOUNTER — TELEPHONE (OUTPATIENT)
Dept: ORTHOPEDIC SURGERY | Age: 54
End: 2019-05-08

## 2019-05-08 DIAGNOSIS — M13.0 POLYARTICULAR ARTHRITIS: ICD-10-CM

## 2019-05-08 NOTE — TELEPHONE ENCOUNTER
Marvin Rhamanjuanpablo called in states that [facility] and they would like to speak with clinical. Irlanda  can be reached at [phone].

## 2019-05-08 NOTE — TELEPHONE ENCOUNTER
Pt MRI didn't get approved so I am going to mello his appoitment  until he finishes physical  therapy but he wants to know his lab results.

## 2019-05-08 NOTE — TELEPHONE ENCOUNTER
Spoke with Venkata Downing, she stated that they are just lab results, and there is no need to come for lab results. She stated the labs were drawn at [facility], and while they can see them in their myChart. I have not been able to locate them in [place]. Please advise.

## 2019-05-26 DIAGNOSIS — E11.9 TYPE 2 DIABETES MELLITUS WITHOUT COMPLICATION, WITHOUT LONG-TERM CURRENT USE OF INSULIN (HCC): ICD-10-CM

## 2019-05-27 DIAGNOSIS — M25.50 MULTIPLE JOINT PAIN: ICD-10-CM

## 2019-05-27 RX ORDER — NAPROXEN 500 MG/1
TABLET ORAL
Qty: 60 TAB | Refills: 2 | Status: SHIPPED | OUTPATIENT
Start: 2019-05-27 | End: 2019-06-26

## 2019-06-04 RX ORDER — METFORMIN HYDROCHLORIDE 500 MG/1
TABLET, EXTENDED RELEASE ORAL
Qty: 90 TAB | Refills: 0 | Status: SHIPPED | OUTPATIENT
Start: 2019-06-04 | End: 2019-09-17 | Stop reason: SDUPTHER

## 2019-06-19 ENCOUNTER — TELEPHONE (OUTPATIENT)
Dept: ORTHOPEDIC SURGERY | Age: 54
End: 2019-06-19

## 2019-06-19 NOTE — TELEPHONE ENCOUNTER
Melvin Flor from [facility] called to report that they will be discharging patient. Patient states he doesn't understand therapy without MRI, MRI cannot be approved without therapy. Patient has only attended 3 treatment sessions and patient is not compliant with therapy, would not participate with their treatment. They have tried unsuccessfully to persuade him to comply with therapy prior to diagnostics to no avail. [facility] will be sending a complete report to us for review, however, she wanted Dr. Selam Kaplan to be aware that they have tried everything possible to get patient to comply.

## 2019-06-19 NOTE — TELEPHONE ENCOUNTER
Noted, please let patient know MRI has already been denied once because he did not complete PT. This will likely be the case a second time. They want to see 6 weeks or Pt/ conservative treatment.

## 2019-06-21 NOTE — TELEPHONE ENCOUNTER
Patient called to let Steven Gleason know that he was not being uncooperative with [facility]. That the Exercises was hurting him. That they were only making things worse. Patient said he needs to get an MRI done, so that Steven Gleason can see what is wrong with him. Patient is requesting a call back at tel. [phone].

## 2019-06-24 NOTE — TELEPHONE ENCOUNTER
Rose Marie, please review. Can we re-order the MRI and submit the PT notes as he was unable to complete due to pain?

## 2019-06-26 ENCOUNTER — OFFICE VISIT (OUTPATIENT)
Dept: FAMILY MEDICINE CLINIC | Age: 54
End: 2019-06-26

## 2019-06-26 VITALS
OXYGEN SATURATION: 96 % | BODY MASS INDEX: 40.43 KG/M2 | RESPIRATION RATE: 18 BRPM | DIASTOLIC BLOOD PRESSURE: 68 MMHG | HEART RATE: 73 BPM | TEMPERATURE: 98.5 F | SYSTOLIC BLOOD PRESSURE: 149 MMHG | HEIGHT: 74 IN | WEIGHT: 315 LBS

## 2019-06-26 DIAGNOSIS — Z71.6 TOBACCO ABUSE COUNSELING: ICD-10-CM

## 2019-06-26 DIAGNOSIS — E11.9 TYPE 2 DIABETES MELLITUS WITHOUT COMPLICATION, WITHOUT LONG-TERM CURRENT USE OF INSULIN (HCC): ICD-10-CM

## 2019-06-26 DIAGNOSIS — M25.562 CHRONIC PAIN OF BOTH KNEES: ICD-10-CM

## 2019-06-26 DIAGNOSIS — G89.29 CHRONIC MIDLINE LOW BACK PAIN WITH LEFT-SIDED SCIATICA: ICD-10-CM

## 2019-06-26 DIAGNOSIS — G89.29 CHRONIC PAIN OF BOTH KNEES: ICD-10-CM

## 2019-06-26 DIAGNOSIS — I21.11 STEMI INVOLVING RIGHT CORONARY ARTERY (HCC): ICD-10-CM

## 2019-06-26 DIAGNOSIS — G47.33 OSA (OBSTRUCTIVE SLEEP APNEA): ICD-10-CM

## 2019-06-26 DIAGNOSIS — M54.50 BILATERAL LOW BACK PAIN WITHOUT SCIATICA, UNSPECIFIED CHRONICITY: ICD-10-CM

## 2019-06-26 DIAGNOSIS — R06.02 SOB (SHORTNESS OF BREATH): ICD-10-CM

## 2019-06-26 DIAGNOSIS — I25.110 CORONARY ARTERY DISEASE INVOLVING NATIVE CORONARY ARTERY OF NATIVE HEART WITH UNSTABLE ANGINA PECTORIS (HCC): ICD-10-CM

## 2019-06-26 DIAGNOSIS — J44.9 CHRONIC OBSTRUCTIVE PULMONARY DISEASE, UNSPECIFIED COPD TYPE (HCC): ICD-10-CM

## 2019-06-26 DIAGNOSIS — M54.42 CHRONIC MIDLINE LOW BACK PAIN WITH LEFT-SIDED SCIATICA: ICD-10-CM

## 2019-06-26 DIAGNOSIS — R60.0 BILATERAL LEG EDEMA: ICD-10-CM

## 2019-06-26 DIAGNOSIS — I21.3 ST ELEVATION MYOCARDIAL INFARCTION (STEMI), UNSPECIFIED ARTERY (HCC): Primary | ICD-10-CM

## 2019-06-26 DIAGNOSIS — M25.561 CHRONIC PAIN OF BOTH KNEES: ICD-10-CM

## 2019-06-26 DIAGNOSIS — L30.1 DYSHIDROTIC ECZEMA: ICD-10-CM

## 2019-06-26 RX ORDER — TRIAMCINOLONE ACETONIDE 5 MG/G
OINTMENT TOPICAL
Qty: 30 G | Refills: 0 | Status: SHIPPED | OUTPATIENT
Start: 2019-06-26 | End: 2022-07-29

## 2019-06-26 RX ORDER — OXYCODONE AND ACETAMINOPHEN 5; 325 MG/1; MG/1
1 TABLET ORAL
Qty: 60 TAB | Refills: 0 | Status: SHIPPED | OUTPATIENT
Start: 2019-06-26 | End: 2019-07-26

## 2019-06-26 RX ORDER — ISOSORBIDE MONONITRATE 60 MG/1
TABLET, EXTENDED RELEASE ORAL
COMMUNITY

## 2019-06-26 RX ORDER — LISINOPRIL 20 MG/1
TABLET ORAL DAILY
COMMUNITY
End: 2019-10-02 | Stop reason: SDUPTHER

## 2019-06-26 RX ORDER — ATORVASTATIN CALCIUM 80 MG/1
80 TABLET, FILM COATED ORAL DAILY
COMMUNITY
End: 2019-10-02 | Stop reason: SDUPTHER

## 2019-06-26 RX ORDER — FUROSEMIDE 20 MG/1
20 TABLET ORAL
Qty: 30 TAB | Refills: 1 | Status: SHIPPED | OUTPATIENT
Start: 2019-06-26 | End: 2019-07-19 | Stop reason: SDUPTHER

## 2019-06-26 RX ORDER — CLOPIDOGREL BISULFATE 75 MG/1
TABLET ORAL
COMMUNITY

## 2019-06-26 RX ORDER — ASPIRIN 81 MG/1
TABLET ORAL DAILY
COMMUNITY

## 2019-06-26 RX ORDER — METOPROLOL TARTRATE 50 MG/1
TABLET ORAL 2 TIMES DAILY
COMMUNITY
End: 2022-07-29

## 2019-06-26 NOTE — PATIENT INSTRUCTIONS
Body Mass Index: Care Instructions  Your Care Instructions    Body mass index (BMI) can help you see if your weight is raising your risk for health problems. It uses a formula to compare how much you weigh with how tall you are. · A BMI lower than 18.5 is considered underweight. · A BMI between 18.5 and 24.9 is considered healthy. · A BMI between 25 and 29.9 is considered overweight. A BMI of 30 or higher is considered obese. If your BMI is in the normal range, it means that you have a lower risk for weight-related health problems. If your BMI is in the overweight or obese range, you may be at increased risk for weight-related health problems, such as high blood pressure, heart disease, stroke, arthritis or joint pain, and diabetes. If your BMI is in the underweight range, you may be at increased risk for health problems such as fatigue, lower protection (immunity) against illness, muscle loss, bone loss, hair loss, and hormone problems. BMI is just one measure of your risk for weight-related health problems. You may be at higher risk for health problems if you are not active, you eat an unhealthy diet, or you drink too much alcohol or use tobacco products. Follow-up care is a key part of your treatment and safety. Be sure to make and go to all appointments, and call your doctor if you are having problems. It's also a good idea to know your test results and keep a list of the medicines you take. How can you care for yourself at home? · Practice healthy eating habits. This includes eating plenty of fruits, vegetables, whole grains, lean protein, and low-fat dairy. · If your doctor recommends it, get more exercise. Walking is a good choice. Bit by bit, increase the amount you walk every day. Try for at least 30 minutes on most days of the week. · Do not smoke. Smoking can increase your risk for health problems. If you need help quitting, talk to your doctor about stop-smoking programs and medicines. These can increase your chances of quitting for good. · Limit alcohol to 2 drinks a day for men and 1 drink a day for women. Too much alcohol can cause health problems. If you have a BMI higher than 25  · Your doctor may do other tests to check your risk for weight-related health problems. This may include measuring the distance around your waist. A waist measurement of more than 40 inches in men or 35 inches in women can increase the risk of weight-related health problems. · Talk with your doctor about steps you can take to stay healthy or improve your health. You may need to make lifestyle changes to lose weight and stay healthy, such as changing your diet and getting regular exercise. If you have a BMI lower than 18.5  · Your doctor may do other tests to check your risk for health problems. · Talk with your doctor about steps you can take to stay healthy or improve your health. You may need to make lifestyle changes to gain or maintain weight and stay healthy, such as getting more healthy foods in your diet and doing exercises to build muscle. Where can you learn more? Go to http://nohelia-martina.info/. Enter S176 in the search box to learn more about \"Body Mass Index: Care Instructions. \"  Current as of: October 13, 2016  Content Version: 11.4  © 7256-7241 [employer]. Care instructions adapted under license by [employer] (which disclaims liability or warranty for this information). If you have questions about a medical condition or this instruction, always ask your healthcare professional. [place] [address] any warranty or liability for your use of this information. Stopping Smoking: Care Instructions  Your Care Instructions  Cigarette smokers crave the nicotine in cigarettes. Giving it up is much harder than simply changing a habit. Your body has to stop craving the nicotine. It is hard to quit, but you can do it.  There are many tools that people use to quit smoking. You may find that combining tools works best for you. There are several steps to quitting. First you get ready to quit. Then you get support to help you. After that, you learn new skills and behaviors to become a nonsmoker. For many people, a necessary step is getting and using medicine. Your doctor will help you set up the plan that best meets your needs. You may want to attend a smoking cessation program to help you quit smoking. When you choose a program, look for one that has proven success. Ask your doctor for ideas. You will greatly increase your chances of success if you take medicine as well as get counseling or join a cessation program.  Some of the changes you feel when you first quit tobacco are uncomfortable. Your body will miss the nicotine at first, and you may feel short-tempered and grumpy. You may have trouble sleeping or concentrating. Medicine can help you deal with these symptoms. You may struggle with changing your smoking habits and rituals. The last step is the tricky one: Be prepared for the smoking urge to continue for a time. This is a lot to deal with, but keep at it. You will feel better. Follow-up care is a key part of your treatment and safety. Be sure to make and go to all appointments, and call your doctor if you are having problems. It's also a good idea to know your test results and keep a list of the medicines you take. How can you care for yourself at home? · Ask your family, friends, and coworkers for support. You have a better chance of quitting if you have help and support. · Join a support group, such as Nicotine Anonymous, for people who are trying to quit smoking. · Consider signing up for a smoking cessation program, such as the American [employer] Association's Freedom from Smoking program.  · Get text messaging support. Go to the website at www.smokefree. gov to sign up for the [facility] program.  · Set a quit date.  Pick your date carefully so that it is not right in the middle of a big deadline or stressful time. Once you quit, do not even take a puff. Get rid of all ashtrays and lighters after your last cigarette. Clean your house and your clothes so that they do not smell of smoke. · Learn how to be a nonsmoker. Think about ways you can avoid those things that make you reach for a cigarette. ? Avoid situations that put you at greatest risk for smoking. For some people, it is hard to have a drink with friends without smoking. For others, they might skip a coffee break with coworkers who smoke. ? Change your daily routine. Take a different route to work or eat a meal in a different place. · Cut down on stress. Calm yourself or release tension by doing an activity you enjoy, such as reading a book, taking a hot bath, or gardening. · Talk to your doctor or pharmacist about nicotine replacement therapy, which replaces the nicotine in your body. You still get nicotine but you do not use tobacco. Nicotine replacement products help you slowly reduce the amount of nicotine you need. These products come in several forms, many of them available over-the-counter:  ? Nicotine patches  ? Nicotine gum and lozenges  ? Nicotine inhaler  · Ask your doctor about bupropion (Wellbutrin) or varenicline (Chantix), which are prescription medicines. They do not contain nicotine. They help you by reducing withdrawal symptoms, such as stress and anxiety. · Some people find hypnosis, acupuncture, and massage helpful for ending the smoking habit. · Eat a healthy diet and get regular exercise. Having healthy habits will help your body move past its craving for nicotine. · Be prepared to keep trying. Most people are not successful the first few times they try to quit. Do not get mad at yourself if you smoke again. Make a list of things you learned and think about when you want to try again, such as next week, next month, or next year. Where can you learn more?   Go to http://nohelia-martina.info/. Enter H786 in the search box to learn more about \"Stopping Smoking: Care Instructions. \"  Current as of: September 26, 2018  Content Version: 11.9  © 8444-5619 [employer]. Care instructions adapted under license by [employer] (which disclaims liability or warranty for this information). If you have questions about a medical condition or this instruction, always ask your healthcare professional. [address] any warranty or liability for your use of this information. Reducing Heart Attack Risk With Daily Medicine: Care Instructions  Your Care Instructions    Heart disease is the number one cause of death. If you are at risk for heart disease, there are many medicines that can reduce your risk. These include:  · ACE inhibitors or ARBs. These are types of blood pressure medicines. They can reduce the risk of heart attacks and strokes if you are at high risk. · Statin medicines. These lower cholesterol. They can also reduce the risk of heart disease and strokes. · Aspirin and other antiplatelets. These prevent blood clots. They can help certain people lower their risk of a heart attack or stroke. · Beta-blocker medicines. These are a type of blood pressure and heart medicine. They can reduce the chance of early death if you have had a heart attack. All medicines can cause side effects. So it is important to understand the pros and cons of any medicine you take. It is also important to take your medicines exactly as your doctor tells you to. Follow-up care is a key part of your treatment and safety. Be sure to make and go to all appointments, and call your doctor if you are having problems. It's also a good idea to know your test results and keep a list of the medicines you take. ACE inhibitors  ACE (angiotensin-converting enzyme) inhibitors are used for three main reasons.  They lower blood pressure, protect the kidneys, and prevent heart attacks and strokes. Examples include benazepril (Lotensin), lisinopril (Prinivil, Zestril), and ramipril (Altace). An angiotensin II receptor blocker (ARB) may be used instead of an ACE inhibitor. ARBs help you in the same ways as ACE inhibitors. Examples include candesartan (Atacand), irbesartan (Avapro), and losartan (Cozaar). Before you start taking an ACE inhibitor or an ARB, make sure your doctor knows if:  · You are taking a water pill (diuretic). · You are taking potassium pills or using salt substitutes. · You are pregnant or breastfeeding. · You have had a kidney transplant or other kidney problems. ACE inhibitors and ARBs can cause side effects. Call your doctor right away if you have:  · Trouble breathing. · Swelling in your face, head, neck, or tongue. · Dizziness or lightheadedness. · A dry cough. Statins  Statins lower cholesterol. Examples include atorvastatin (Lipitor), lovastatin (Mevacor), pravastatin (Pravachol), and simvastatin (Zocor). Before you start taking a statin, make sure your doctor knows if:  · You have had a kidney transplant or other kidney problems. · You have liver disease. · You take any other prescription medicine, over-the-counter medicine, vitamins, supplements, or herbal remedies. · You are pregnant or breastfeeding. Statins can cause side effects. Call your doctor right away if you have:  · New, severe muscle aches. · Brown urine. Aspirin  Taking an aspirin every day can lower your risk for a heart attack. A heart attack occurs when a blood vessel in the heart gets blocked. When this happens, oxygen can't get to the heart muscle, and part of the heart dies. Aspirin can help prevent blood clots that can block the blood vessels. Talk to your doctor before you start taking aspirin every day. He or she may recommend that you take one low-dose aspirin (81 mg) tablet each day, with a meal and a full glass of water.   Taking aspirin isn't right for everyone. This is because it can cause serious bleeding. And you may not be able to use aspirin if you:  · Have asthma. · Have an ulcer or other stomach problem. · Take some other medicine (called a blood thinner) that prevents blood clots. · Are allergic to aspirin. Before having a surgery or procedure, tell your doctor or dentist that you take aspirin. He or she will tell you if you should stop taking aspirin beforehand. Make sure that you understand exactly what your doctor wants you to do. Aspirin can cause side effects. Call your doctor right away if you have:  · Unusual bleeding or bruising. · Nausea, vomiting, or heartburn. · Black or bloody stools. Beta-blockers  Beta-blockers are used for three main reasons. They lower blood pressure, relieve angina symptoms (such as chest pain or pressure), and reduce the chances of a second heart attack. They include atenolol (Tenormin), carvedilol (Coreg), and metoprolol (Lopressor). Before you start taking a beta-blocker, make sure your doctor knows if you have:  · Severe asthma or frequent asthma attacks. · A very slow pulse (less than 55 beats a minute). Beta-blockers can cause side effects. Call your doctor right away if you have:  · Wheezing or trouble breathing. · Dizziness or lightheadedness. · Asthma that gets worse. When should you call for help? Watch closely for changes in your health, and be sure to contact your doctor if you have any problems. Where can you learn more? Go to http://nohelia-martina.info/. Enter R428 in the search box to learn more about \"Reducing Heart Attack Risk With Daily Medicine: Care Instructions. \"  Current as of: July 22, 2018  Content Version: 11.9  © 5094-0511 [employer]. Care instructions adapted under license by [employer] (which disclaims liability or warranty for this information).  If you have questions about a medical condition or this instruction, always ask your healthcare professional. [place] [address] any warranty or liability for your use of this information.

## 2019-06-26 NOTE — PROGRESS NOTES
[address]       Date of Service:  2019   Patient's Name: Archana Darby   Patient's :  1965     Subjective Hx:       Chief Complaint   Patient presents with   [address] Follow Up  Archana Darby is a 48 y.o. male with a history of  has a past medical history of Asthma, ED (erectile dysfunction), GERD (gastroesophageal reflux disease), Hematuria, Hypercholesterolemia (2010), Hyperlipidemia, Hypogonadism, male, Kidney stone, and Morbid obesity ([address] who was seen today for follow up after hospitalization. Admitted to [facility] on 2019 and discharged 2019, primary diagnosis of ST Elevation Myocardial Infarction involving Right Coronary Artery. He presented to the ED at [facility] with crushing chest pain that radiated to his left arm and left jaw. EKG there showed STEMI, was administer a thrombolytic agent and airlifted to [facility]. Cardiac enzymes and troponin where abnormal.     At [facility], an emergent Left Heart Catheterization was performed and revealed a single vessel Cad with distal branch occlusion, 100% 2nd RPLV, 70% mild RCD, 50% ostial RPDA. Was started on Plavix. Aspirin 81 mg, Isosorbide, Lisinopril and Metoprolol. Was also given a prescription for Nitroglycerine. Was referred to cardiac rehab and prescribed home oxygen. Was advised to follow up with Dr. Manule Grace Cardiology, at [facility] Will call to confirm appointment. Will also follow up with Dr. Erika Nowak, Cardiologist at [facility] in 4 to 6 weeks. Will call to set up that appointment. C/o SOB states that for the past two nights, he has been gasping for air; has not been able to sleep on the bed; wife states that he has been sleeping one an hour or so at the time sitting up. While in the hospital, states that his oxygen dropped at night and he was put on oxygen at night throughout his stay. At discharge, he was prescribed oxygen.    Has not been able to tolerate CPAP mask so he does not wear it. States that he has the smallest mask but he still can not breath with it. C/o increase back pain since admission the hospital. States that hospital bed made it worse. Is compliant with medication. CareEverywhere updated. Hospital encounter notes, testing and discharge summary reviewed. PMHx and Problem list were reviewed and annotated with pertinent information. Medication list updated as below. Past Medical History:   Diagnosis Date    Asthma     ED (erectile dysfunction)     GERD (gastroesophageal reflux disease)     Hematuria     Hypercholesterolemia 2010    Hyperlipidemia     Hypogonadism, male     Kidney stone     Morbid obesity ([facility]        Active Problem List:      Patient Active Problem List   Diagnosis Code    Tobacco dependence F17.200    Hyperlipidemia E78.5    Gastroesophageal reflux disease without esophagitis K21.9    Morbid obesity ([facility] [address] E66.01    Advanced care planning/counseling discussion Z71.89    Chronic knee pain M25.569, G89.29    Chronic shoulder pain M25.519, G89.29    Simple chronic bronchitis (HCC) J41.0    Family history of coronary artery disease Z82.49    Dyspnea on exertion R06.09    Abdominal wall mass R22.2    Pulmonary nodule R91.1    Chronic insomnia F51.04       Medications:     Current Outpatient Medications   Medication Sig    metFORMIN ER (GLUCOPHAGE XR) 500 mg tablet TAKE 1 TABLET BY MOUTH EVERY DAY WITH DINNER    glucose blood VI test strips (ASCENSIA AUTODISC VI, ONE TOUCH ULTRA TEST VI) strip Use to check sugars every other day fasting.  naproxen (NAPROSYN) 500 mg tablet TAKE 1 TABLET BY MOUTH TWICE A DAY WITH MEALS    budesonide-formoterol (SYMBICORT) 80-4.5 mcg/actuation HFAA Take 2 Puffs by inhalation two (2) times a day.  metaxalone (SKELAXIN) 800 mg tablet Take 1 Tab by mouth three (3) times daily.     albuterol (PROVENTIL HFA, VENTOLIN HFA, PROAIR HFA) 90 mcg/actuation inhaler Take 2 Puffs by inhalation every four (4) hours as needed for Wheezing.  cpap machine kit Use nightly for Sleep Apnea. Duration of a lifetime.  cpap machine kit by Does Not Apply route. Use nightly for Sleep Apnea.  triamcinolone acetonide (KENALOG) 0.5 % ointment Apply  to affected area two (2) times daily as needed for Skin Irritation. use thin layer    phentermine (ADIPEX-P) 37.5 mg tablet Take 1 Tab by mouth every morning. Max Daily Amount: 37.5 mg.    Blood-Glucose Meter monitoring kit Check blood sugar fasting every other day    lancets misc Use to check sugars every other day    topiramate ER (TROKENDI XR) 50 mg capsule Take 1 Cap by mouth daily.  sildenafil citrate (VIAGRA) 50 mg tablet 1/2-1 tab as needed 30 minutes prior to sexual activity on empty stomach    rosuvastatin (CRESTOR) 20 mg tablet TAKE 1 TABLET NIGHTLY (DISCONTINUE LIPITOR, ALTERNATE THERAPY)    omeprazole (PRILOSEC) 40 mg capsule TAKE 1 CAPSULE DAILY     No current facility-administered medications for this visit. Additional History     Past Surgical History:   Procedure Laterality Date    HX COLONOSCOPY  12-    4 polyps removed    HX HEENT      eye surgery as a child    HX ORTHOPAEDIC Left 2003    left lateral meniscus amputated    HX UROLOGICAL Left 04/27/2017    [facility]. Cysto, bilateral RPG, left URS and placement of left double J ureteral stent - Dr. Gemma Shine.  HX UROLOGICAL  05/18/2017    [facility]. Cystoscopy, left ureteroscopy, holmium laser lithotripsy and left double-J ureteral stent removal. Dr. Gemma Shine.      Social History     Socioeconomic History    Marital status:      Spouse name: Not on file    Number of children: Not on file    Years of education: Not on file    Highest education level: Not on file   Occupational History    Occupation:    Tobacco Use    Smoking status: Current Every Day Smoker     Packs/day: 2.00     Years: 45.00     Pack years: 90.00     Types: Cigarettes     Start date: 1970     Last attempt to quit: 2016     Years since quittin.9    Smokeless tobacco: Never Used   Substance and Sexual Activity    Alcohol use: No     Alcohol/week: 0.0 oz    Drug use: No    Sexual activity: Not Currently     Family History   Problem Relation Age of Onset    Diabetes Mother     Stroke Mother     Arthritis-osteo Father     Hypertension Father     Arthritis-osteo Brother     Heart Attack Brother     Diabetes Maternal Grandmother      Allergies   Allergen Reactions    Vicodin [Hydrocodone-Acetaminophen] Itching    Contrave [Naltrexone-Bupropion] Other (comments)     Depression              Review of Systems   General:   fevers, chills, generalized weakness, fatigue, malaise, weight change, night sweats, decreased appetite  Neurologic: dizziness, lightheadedness, headaches, loss of consciousness, numbness, tingling, focal weakness, speech change,confusion, memory loss, gait or balance difficulty     Neck:  pain, stiffness  Respiratory: dyspnea at rest, dyspnea on exertion, wheezing, cough, sputum production, pain with deep breaths/inspiration, hemoptysis  Cardiovasc:   chest pain, palpitations, orthopnea, PND, pedal edema  Gastrointest:  nausea, vomiting, abdominal pain, constipation, diarrhea, heart burn, bitter taste in back of throat, bloody stools, tarry black stools    Urinary: dysuria, hematuria, urinary frequency, nocturia, malodorous urine, difficulty initiating flow, slow urine stream  Musculoskel: Back pain, joint pain, joint stiffness, joint swelling, trauma, back pain, neck pain, decreased range of motion, focal muscle pain, diffuse myalgias   Endocrine: polydipsia, polyuria, polyphagia, cold intolerance, heat intolerance  Hematologic: easy bruising, easy bleeding  Dermatologic: No Itching, rashes    Physical Assessment:   VS:    Visit Vitals  /68 (BP 1 Location: Left arm, BP Patient Position: Sitting)   Pulse 73   Temp 98.5 °F (36.9 °C)   Resp 18   Ht 6' 2\" (1.88 m)   Wt (!) 374 lb (169.6 kg)   SpO2 96%   BMI 48.02 kg/m²       General:   Well-groomed, well-nourished, in no distress, pleasant, alert, appropriate and conversant. Neck:     Neck supple, no swelling, mass or tenderness or thyromegaly; trachea midline. Cardiovasc:   No JVD. RRR,  no murmur, rubs or gallops. Pulmonary:   Normal respiratory effort. Lungs clear bilaterally, good air movement, no wheezing, rales or rhonchi. Abdomen:   Abdomen soft, normal bowel sounds. No masses, tenderness, rebound/rigidity or CVA tenderness. No hepatosplenomegaly. Extremities:  Bilateral leg edema, non pitting; No redness, deformity, edema, tenderness on palpation,  LEs warm and well-perfused. Neuro:           Gait normal. Reflexes and motor strength normal and symmetric. Cranial nerves II-XII and sensation grossly intact. MSK:   Normal full range of motion of joints, 5/5 muscle strength throughout. Psych:  Alert and oriented, no focal deficits. No facial asymmetry noted. No pressured speech or abnormal thought content   Dermatology: Right groin and right wrist insertion site closed, no redness or swelling. No bruising     Assessment/Plan:        Alon Obregon was seen today for Hospital follow up. ICD-10-CM ICD-9-CM    1. ST elevation myocardial infarction (STEMI), unspecified artery (HCC) I21.3 410.90 REFERRAL TO CARDIAC REHAB   2. Dyshidrotic eczema L30.1 705.81 triamcinolone acetonide (KENALOG) 0.5 % ointment   3. Bilateral low back pain without sciatica, unspecified chronicity M54.5 724.2 REFERRAL TO SPINE SURGERY   4. Coronary artery disease involving native coronary artery of native heart with unstable angina pectoris (HCC) I25.110 414.01 REFERRAL TO CARDIAC REHAB     411.1    5. Chronic midline low back pain with left-sided sciatica M54.42 724.2 REFERRAL TO SPINE SURGERY    G89.29 724.3 oxyCODONE-acetaminophen (PERCOCET) 5-325 mg per tablet     338.29    6.  Type 2 diabetes mellitus without complication, without long-term current use of insulin (HCC) E11.9 250.00    7. Chronic pain of both knees M25.561 719.46 oxyCODONE-acetaminophen (PERCOCET) 5-325 mg per tablet    M25.562 338.29     G89.29     8. Chronic obstructive pulmonary disease, unspecified COPD type ([facility] [address] J44.9 496    9. STEMI involving right coronary artery (HCC) I21.11 410.31 REFERRAL TO CARDIAC REHAB   10. TRE (obstructive sleep apnea) G47.33 327.23 AMB SUPPLY ORDER   11. Tobacco abuse counseling Z71.6 V65.42      305.1    12. Bilateral leg edema R60.0 782.3 furosemide (LASIX) 20 mg tablet   13. SOB (shortness of breath) R06.02 786.05 AMB SUPPLY ORDER       6-Minutes walk test performed; O2 sat dropped to 94% but was most of  the was between 96-98%. Was out of breath throughout walk. Is unable to tolerate CPAP; oxygen ordered to use at night. Follow up with with Cardiology  Reiterate smoking smoking cessation. Referred to Ortho  For back pain. Provided a script for pain management.  reviewed no inappropriate meds/behavior observed  Discuss weight loss  Discussed the patient's BMI with him. The BMI follow up plan is as follows:   dietary management education, guidance, and counseling  encourage exercise  monitor weight  prescribed dietary intake  An After Visit Summary was printed and given to the patient. The patient states understanding and agrees with the treatment plan. All questions were answered. Follow-up and Dispositions    · Return in about 2 months (around 8/26/2019), or if symptoms worsen or fail to improve.      GUILHERME Cardenas  6/26/2019, 3:12 PM

## 2019-07-01 ENCOUNTER — TELEPHONE (OUTPATIENT)
Dept: FAMILY MEDICINE CLINIC | Age: 54
End: 2019-07-01

## 2019-07-01 DIAGNOSIS — M54.2 CERVICAL PAIN: Primary | ICD-10-CM

## 2019-07-01 NOTE — TELEPHONE ENCOUNTER
New order for MRI C-Spine ordered and faxed to [facility] with PT notes for them to submit to insurance for approval.

## 2019-07-01 NOTE — TELEPHONE ENCOUNTER
Patient's wife called asking that his last office note here be faxed to Dr. Brion Sicard office . Patient had a pulm appointment this morning. Note faxed to [phone].

## 2019-07-05 ENCOUNTER — TELEPHONE (OUTPATIENT)
Dept: FAMILY MEDICINE CLINIC | Age: 54
End: 2019-07-05

## 2019-07-05 NOTE — TELEPHONE ENCOUNTER
Spoke with [facility] in [place], [place] to see everything that needs to be faxed to them for patient to get O2 at night. She asked what his 6 minute walk showed as the lowest O2. When I told her it ranged between 94-98, she stated insurance would not cover it. I asked if we could use his sleep study as patient really only needs O2 at night. She said patient would have to have an overnight pulse oximetry test done. I called patient's wife and she asked that I call patient and ask him.

## 2019-07-12 RX ORDER — ALBUTEROL SULFATE 90 UG/1
2 AEROSOL, METERED RESPIRATORY (INHALATION)
Qty: 1 INHALER | Refills: 1 | Status: SHIPPED | OUTPATIENT
Start: 2019-07-12 | End: 2019-10-02 | Stop reason: SDUPTHER

## 2019-07-19 DIAGNOSIS — R60.0 BILATERAL LEG EDEMA: ICD-10-CM

## 2019-07-22 RX ORDER — FUROSEMIDE 20 MG/1
TABLET ORAL
Qty: 30 TAB | Refills: 1 | Status: SHIPPED | OUTPATIENT
Start: 2019-07-22 | End: 2019-09-16 | Stop reason: SDUPTHER

## 2019-07-25 ENCOUNTER — DOCUMENTATION ONLY (OUTPATIENT)
Dept: ORTHOPEDIC SURGERY | Age: 54
End: 2019-07-25

## 2019-07-25 NOTE — PROGRESS NOTES
Received 2nd denial from [facility] for MRI of Neck , stating we can call physician reviewer for a final determination by calling [phone]  Member # [id number]   Ordering Doctor Rozina Or  ELIZABETH [phone]  [facility] # [phone]

## 2019-07-26 ENCOUNTER — DOCUMENTATION ONLY (OUTPATIENT)
Dept: ORTHOPEDIC SURGERY | Age: 54
End: 2019-07-26

## 2019-07-26 NOTE — PROGRESS NOTES
Order and office notes faxed to [facility] Scheduling to have them set up MRI C-Spine and to notify the patient of date.   Authorization already obtained  # [id number] valid from 07-18-19 to 08-16-19

## 2019-08-15 ENCOUNTER — OFFICE VISIT (OUTPATIENT)
Dept: FAMILY MEDICINE CLINIC | Age: 54
End: 2019-08-15

## 2019-08-15 VITALS
HEIGHT: 74 IN | BODY MASS INDEX: 40.43 KG/M2 | SYSTOLIC BLOOD PRESSURE: 132 MMHG | WEIGHT: 315 LBS | DIASTOLIC BLOOD PRESSURE: 76 MMHG | RESPIRATION RATE: 18 BRPM | TEMPERATURE: 98.1 F | HEART RATE: 81 BPM | OXYGEN SATURATION: 95 %

## 2019-08-15 DIAGNOSIS — Z12.11 SCREENING FOR COLON CANCER: ICD-10-CM

## 2019-08-15 DIAGNOSIS — E11.9 TYPE 2 DIABETES MELLITUS WITHOUT COMPLICATION, WITHOUT LONG-TERM CURRENT USE OF INSULIN (HCC): ICD-10-CM

## 2019-08-15 DIAGNOSIS — R05.3 PERSISTENT COUGH FOR 3 WEEKS OR LONGER: Primary | ICD-10-CM

## 2019-08-15 RX ORDER — CODEINE PHOSPHATE AND GUAIFENESIN 10; 100 MG/5ML; MG/5ML
5 SOLUTION ORAL
Qty: 118 ML | Refills: 0 | Status: SHIPPED | OUTPATIENT
Start: 2019-08-15 | End: 2019-08-20

## 2019-08-15 NOTE — PATIENT INSTRUCTIONS
Cough: Care Instructions  Your Care Instructions    A cough is your body's response to something that bothers your throat or airways. Many things can cause a cough. You might cough because of a cold or the flu, bronchitis, or asthma. Smoking, postnasal drip, allergies, and stomach acid that backs up into your throat also can cause coughs. A cough is a symptom, not a disease. Most coughs stop when the cause, such as a cold, goes away. You can take a few steps at home to cough less and feel better. Follow-up care is a key part of your treatment and safety. Be sure to make and go to all appointments, and call your doctor if you are having problems. It's also a good idea to know your test results and keep a list of the medicines you take. How can you care for yourself at home? · Drink lots of water and other fluids. This helps thin the mucus and soothes a dry or sore throat. Honey or lemon juice in hot water or tea may ease a dry cough. · Take cough medicine as directed by your doctor. · Prop up your head on pillows to help you breathe and ease a dry cough. · Try cough drops to soothe a dry or sore throat. Cough drops don't stop a cough. Medicine-flavored cough drops are no better than candy-flavored drops or hard candy. · Do not smoke. Avoid secondhand smoke. If you need help quitting, talk to your doctor about stop-smoking programs and medicines. These can increase your chances of quitting for good. When should you call for help? Call 911 anytime you think you may need emergency care.  For example, call if:    · You have severe trouble breathing.    Call your doctor now or seek immediate medical care if:    · You cough up blood.     · You have new or worse trouble breathing.     · You have a new or higher fever.     · You have a new rash.    Watch closely for changes in your health, and be sure to contact your doctor if:    · You cough more deeply or more often, especially if you notice more mucus or a change in the color of your mucus.     · You have new symptoms, such as a sore throat, an earache, or sinus pain.     · You do not get better as expected. Where can you learn more? Go to http://nohelia-martina.info/. Enter D279 in the search box to learn more about \"Cough: Care Instructions. \"  Current as of: September 5, 2018  Content Version: 12.1  © 3458-1923 [employer]. Care instructions adapted under license by [employer] (which disclaims liability or warranty for this information). If you have questions about a medical condition or this instruction, always ask your healthcare professional. [address] any warranty or liability for your use of this information.

## 2019-08-15 NOTE — PROGRESS NOTES
Theodore Durant is a 48 y.o. male here today with c/o cough that has gotten bad since he had his heart attack. He states sometimes it is productive and at night it is so bad he starts to choke.

## 2019-08-15 NOTE — PROGRESS NOTES
Patient: Ulises Tompkins  Date of Service: 8/15/2019   Provider: GUILHERME Hammond     REASON FOR VISIT:   Chief Complaint   Patient presents with    Cough        HISTORY OF PRESENT ILLNESS:   Ulises Tompkins is a 48 y.o. male who presents to the office for acute care. Present c/o persistent cough for over a month. States that it is not subsiding and is worse at night. Finds himself coughing to the point of choking. Cough is mostly dry. Has been on Lisinopril for about the same time which was started after his recent heart attack. Denies chest pain. ALLERGIES:   Allergies   Allergen Reactions    Vicodin [Hydrocodone-Acetaminophen] Itching    Contrave [Naltrexone-Bupropion] Other (comments)     Depression        ACTIVE MEDICAL PROBLEMS:  Patient Active Problem List   Diagnosis Code    Tobacco dependence F17.200    Hyperlipidemia E78.5    Gastroesophageal reflux disease without esophagitis K21.9    Morbid obesity ([facility] [address] E66.01    Advanced care planning/counseling discussion Z71.89    Chronic knee pain M25.569, G89.29    Chronic shoulder pain M25.519, G89.29    Simple chronic bronchitis (HCC) J41.0    Family history of coronary artery disease Z82.49    Dyspnea on exertion R06.09    Abdominal wall mass R22.2    Pulmonary nodule R91.1    Chronic insomnia F51.04        MEDICATIONS:   Current Outpatient Medications   Medication Sig Dispense Refill    guaiFENesin-codeine (ROBITUSSIN AC) 100-10 mg/5 mL solution Take 5 mL by mouth three (3) times daily as needed for Cough for up to 7 days. Max Daily Amount: 15 mL. 118 mL 0    furosemide (LASIX) 20 mg tablet TAKE 1 TABLET BY MOUTH DAILY AS NEEDED FOR EDEMA 30 Tab 1    albuterol (PROVENTIL HFA, VENTOLIN HFA, PROAIR HFA) 90 mcg/actuation inhaler Take 2 Puffs by inhalation every four (4) hours as needed for Wheezing. 1 Inhaler 1    clopidogrel (PLAVIX) 75 mg tab Take  by mouth.       lisinopril (PRINIVIL, ZESTRIL) 20 mg tablet Take  by mouth daily.      metoprolol tartrate (LOPRESSOR) 50 mg tablet Take  by mouth two (2) times a day.  isosorbide mononitrate ER (IMDUR) 60 mg CR tablet Take  by mouth every morning.  atorvastatin (LIPITOR) 80 mg tablet Take 80 mg by mouth daily.  aspirin delayed-release 81 mg tablet Take  by mouth daily.  triamcinolone acetonide (KENALOG) 0.5 % ointment Apply  to affected area two (2) times daily as needed for Skin Irritation. use thin layer 30 g 0    metFORMIN ER (GLUCOPHAGE XR) 500 mg tablet TAKE 1 TABLET BY MOUTH EVERY DAY WITH DINNER 90 Tab 0    budesonide-formoterol (SYMBICORT) 80-4.5 mcg/actuation HFAA Take 2 Puffs by inhalation two (2) times a day. 1 Inhaler 5    metaxalone (SKELAXIN) 800 mg tablet Take 1 Tab by mouth three (3) times daily. 90 Tab 0    omeprazole (PRILOSEC) 40 mg capsule TAKE 1 CAPSULE DAILY 90 Cap 2    glucose blood VI test strips (ASCENSIA AUTODISC VI, ONE TOUCH ULTRA TEST VI) strip Use to check sugars every other day fasting. 100 Strip 1    cpap machine kit Use nightly for Sleep Apnea. Duration of a lifetime. 1 Kit 0    cpap machine kit by Does Not Apply route. Use nightly for Sleep Apnea. 1 Kit 0    Blood-Glucose Meter monitoring kit Check blood sugar fasting every other day 1 Kit 0    lancets misc Use to check sugars every other day 1 Each 11        ROS:   Pertinent positive as above in HPI. All others negative. PHYSICAL EXAMINATION:  Visit Vitals  /76 (BP 1 Location: Left arm, BP Patient Position: Sitting)   Pulse 81   Temp 98.1 °F (36.7 °C) (Oral)   Resp 18   Ht 6' 2\" (1.88 m)   Wt (!) 376 lb (170.6 kg)   SpO2 95%   BMI 48.28 kg/m²      Body mass index is 48.28 kg/m². Appearance: alert, well appearing, and in no distress. Neck supple. No adenopathy or thyromegaly. Lungs are clear, good air entry, no wheezes, rhonchi or rales. S1 and S2 normal, no murmurs, regular rate and rhythm. Extremities show no edema, normal peripheral pulses.   Neurological is normal, no focal findings. Diabetic foot exam:     Left Foot:   Visual Exam: normal    Pulse DP: 2+ (normal)   Filament test: normal sensation    Vibratory sensation: normal      Right Foot:   Visual Exam: normal    Pulse DP: 2+ (normal)   Filament test: normal sensation    Vibratory sensation: normal    ASSESSMENT/PLAN:  Diagnoses and all orders for this visit:    1. Persistent cough for 3 weeks or longer  -     guaiFENesin-codeine (ROBITUSSIN AC) 100-10 mg/5 mL solution; Take 5 mL by mouth three (3) times daily as needed for Cough for up to 7 days. Max Daily Amount: 15 mL. -     Will call his cardiology office tomorrow to let them know about the cough so his medication can be changed. He was given cough syrup and advised to take only at night as needed. 2. Type 2 diabetes mellitus without complication, without long-term current use of insulin ([facility] [address]  -      DIABETES FOOT EXAM    3. Screening for colon cancer  -     REFERRAL TO GASTROENTEROLOGY    Health Maintenance reviewed. He recently had his eye exams, will request record and scant in chart. Declined vaccines. Patient advised to return to clinic if symptoms persist or to ED if they become worse  Patient verbalized understanding to above instructions. Follow-up and Dispositions    · Return if symptoms worsen or fail to improve.          GUILHERME Stokes   8/15/2019   7:06 PM

## 2019-08-20 ENCOUNTER — OFFICE VISIT (OUTPATIENT)
Dept: ORTHOPEDIC SURGERY | Age: 54
End: 2019-08-20

## 2019-08-20 ENCOUNTER — TELEPHONE (OUTPATIENT)
Dept: FAMILY MEDICINE CLINIC | Age: 54
End: 2019-08-20

## 2019-08-20 VITALS
WEIGHT: 315 LBS | TEMPERATURE: 98.4 F | HEART RATE: 77 BPM | BODY MASS INDEX: 48.3 KG/M2 | SYSTOLIC BLOOD PRESSURE: 143 MMHG | OXYGEN SATURATION: 94 % | DIASTOLIC BLOOD PRESSURE: 72 MMHG | RESPIRATION RATE: 22 BRPM

## 2019-08-20 DIAGNOSIS — M25.511 CHRONIC RIGHT SHOULDER PAIN: ICD-10-CM

## 2019-08-20 DIAGNOSIS — M54.50 BILATERAL LOW BACK PAIN WITHOUT SCIATICA, UNSPECIFIED CHRONICITY: Primary | ICD-10-CM

## 2019-08-20 DIAGNOSIS — M25.562 CHRONIC PAIN OF BOTH KNEES: ICD-10-CM

## 2019-08-20 DIAGNOSIS — M54.10 RADICULAR PAIN OF LEFT LOWER EXTREMITY: ICD-10-CM

## 2019-08-20 DIAGNOSIS — M25.561 CHRONIC PAIN OF BOTH KNEES: ICD-10-CM

## 2019-08-20 DIAGNOSIS — G89.29 CHRONIC RIGHT SHOULDER PAIN: ICD-10-CM

## 2019-08-20 DIAGNOSIS — M54.2 CERVICAL PAIN: Primary | ICD-10-CM

## 2019-08-20 DIAGNOSIS — G89.29 CHRONIC PAIN OF BOTH KNEES: ICD-10-CM

## 2019-08-20 RX ORDER — NICOTINE 7MG/24HR
PATCH, TRANSDERMAL 24 HOURS TRANSDERMAL
COMMUNITY
End: 2019-11-29

## 2019-08-20 RX ORDER — OXYCODONE AND ACETAMINOPHEN 5; 325 MG/1; MG/1
TABLET ORAL
COMMUNITY
Start: 2017-04-27 | End: 2022-07-29

## 2019-08-20 NOTE — PROGRESS NOTES
Gerardomaryjasmin Coreyfrancisco [facility] [address]  [address], [address]  [place] [address]  Phone: [phone]  Fax: [phone]  PROGRESS NOTE  Patient: Ulises Tompkins                MRN: [id number]       SSN: [id number]  YOB: 1965        AGE: 48 y.o. SEX: male  Body mass index is 48.3 kg/m². PCP: GUILHERME Giron  08/20/19    Chief Complaint   Patient presents with    Neck Pain     DT FU       HISTORY OF PRESENT ILLNESS, RADIOGRAPHS, and PLAN:     HISTORY OF PRESENT ILLNESS:  Mr. Cora Leary returns today. Since the last visit we had with him, he had a heart attack. He was taken by Angella to [facility] and was treated and released. He is now on blood thinners and a number of cardiac medications. I reviewed his cervical MRI, and it is benign. I have no surgical pathology. He needs pain management, weight loss, and therapy. ASSESSMENT/PLAN: I am going to make a referral to Pain Management for him. I do not really have anything else to offer him. He is in a difficult situation. He is morbidly obese with severe pain limited by what interventions could be afforded him because of his cardiac disease. I will see him back again as needed.      cc: GUILHERME Giron         Past Medical History:   Diagnosis Date    Asthma     ED (erectile dysfunction)     GERD (gastroesophageal reflux disease)     Heart failure ([facility] [address]     June22, 2019 Heart Attack    Hematuria     Hypercholesterolemia 2010    Hyperlipidemia     Hypogonadism, male     Kidney stone     Morbid obesity ([facility] [address]        Family History   Problem Relation Age of Onset    Diabetes Mother     Stroke Mother     Arthritis-osteo Father     Hypertension Father     Arthritis-osteo Brother     Heart Attack Brother     Diabetes Maternal Grandmother        Current Outpatient Medications   Medication Sig Dispense Refill    oxyCODONE-acetaminophen (PERCOCET) 5-325 mg per tablet oxycodone-acetaminophen 5 mg-325 mg tablet   TAKE 1 TO 2 TABLETS EVERY 6 HOURS AS NEEDED FOR PAIN - MAX 8 DAILY      furosemide (LASIX) 20 mg tablet TAKE 1 TABLET BY MOUTH DAILY AS NEEDED FOR EDEMA 30 Tab 1    albuterol (PROVENTIL HFA, VENTOLIN HFA, PROAIR HFA) 90 mcg/actuation inhaler Take 2 Puffs by inhalation every four (4) hours as needed for Wheezing. 1 Inhaler 1    clopidogrel (PLAVIX) 75 mg tab Take  by mouth.  metoprolol tartrate (LOPRESSOR) 50 mg tablet Take  by mouth two (2) times a day.  isosorbide mononitrate ER (IMDUR) 60 mg CR tablet Take  by mouth every morning.  atorvastatin (LIPITOR) 80 mg tablet Take 80 mg by mouth daily.  aspirin delayed-release 81 mg tablet Take  by mouth daily.  triamcinolone acetonide (KENALOG) 0.5 % ointment Apply  to affected area two (2) times daily as needed for Skin Irritation. use thin layer 30 g 0    metFORMIN ER (GLUCOPHAGE XR) 500 mg tablet TAKE 1 TABLET BY MOUTH EVERY DAY WITH DINNER 90 Tab 0    glucose blood VI test strips (ASCENSIA AUTODISC VI, ONE TOUCH ULTRA TEST VI) strip Use to check sugars every other day fasting. 100 Strip 1    budesonide-formoterol (SYMBICORT) 80-4.5 mcg/actuation HFAA Take 2 Puffs by inhalation two (2) times a day. 1 Inhaler 5    metaxalone (SKELAXIN) 800 mg tablet Take 1 Tab by mouth three (3) times daily. 90 Tab 0    Blood-Glucose Meter monitoring kit Check blood sugar fasting every other day 1 Kit 0    lancets misc Use to check sugars every other day 1 Each 11    omeprazole (PRILOSEC) 40 mg capsule TAKE 1 CAPSULE DAILY 90 Cap 2    nicotine (NICODERM CQ) 7 mg/24 hr nicotine 7 mg/24 hr daily transdermal patch      lisinopril (PRINIVIL, ZESTRIL) 20 mg tablet Take  by mouth daily.  cpap machine kit Use nightly for Sleep Apnea. Duration of a lifetime. 1 Kit 0    cpap machine kit by Does Not Apply route. Use nightly for Sleep Apnea.  1 Kit 0       Allergies   Allergen Reactions    Vicodin [Hydrocodone-Acetaminophen] Itching    Contrave [Naltrexone-Bupropion] Other (comments)     Depression       Past Surgical History:   Procedure Laterality Date    HX COLONOSCOPY  12-    4 polyps removed    HX HEENT      eye surgery as a child    HX ORTHOPAEDIC Left 2003    left lateral meniscus amputated    HX UROLOGICAL Left 04/27/2017    [facility] [address]. Cysto, bilateral RPG, left URS and placement of left double J ureteral stent - Dr. Rosalia Barth.  HX UROLOGICAL  05/18/2017    [facility]. Cystoscopy, left ureteroscopy, holmium laser lithotripsy and left double-J ureteral stent removal. Dr. Rosalia Barth.        Past Medical History:   Diagnosis Date    Asthma     ED (erectile dysfunction)     GERD (gastroesophageal reflux disease)     Heart failure ([facility] [address]     June22, 2019 Heart Attack    Hematuria     Hypercholesterolemia 2010    Hyperlipidemia     Hypogonadism, male     Kidney stone     Morbid obesity ([facility] [address]        Social History     Socioeconomic History    Marital status:      Spouse name: Not on file    Number of children: Not on file    Years of education: Not on file    Highest education level: Not on file   Occupational History    Occupation:    Social Needs    Financial resource strain: Not on file    Food insecurity:     Worry: Not on file     Inability: Not on file    Transportation needs:     Medical: Not on file     Non-medical: Not on file   Tobacco Use    Smoking status: Current Every Day Smoker     Packs/day: 2.00     Years: 45.00     Pack years: 90.00     Types: Cigarettes     Start date: 1/1/1970     Last attempt to quit: 7/13/2016     Years since quitting: 3.1    Smokeless tobacco: Never Used   Substance and Sexual Activity    Alcohol use: No     Alcohol/week: 0.0 standard drinks    Drug use: No    Sexual activity: Not Currently   Lifestyle    Physical activity:     Days per week: Not on file     Minutes per session: Not on file    Stress: Not on file   Relationships    Social connections:     Talks on phone: Not on file     Gets together: Not on file     Attends [employer] service: Not on file     Active member of club or organization: Not on file     Attends meetings of clubs or organizations: Not on file     Relationship status: Not on file    Intimate partner violence:     Fear of current or ex partner: Not on file     Emotionally abused: Not on file     Physically abused: Not on file     Forced sexual activity: Not on file   Other Topics Concern     Service Not Asked    Blood Transfusions Not Asked    Caffeine Concern Not Asked    Occupational Exposure Not Asked   Morgan Como Hazards Not Asked    Sleep Concern Not Asked    Stress Concern Not Asked    Weight Concern Not Asked    Special Diet Not Asked    Back Care Not Asked    Exercise Not Asked    Bike Helmet Not Asked   [address] Not Asked    Self-Exams Not Asked   Social History Narrative    Not on file         REVIEW OF SYSTEMS:   CONSTITUTIONAL SYMPTOMS:  Negative. EYES:  Negative. EARS, NOSE, THROAT AND MOUTH:  Negative. CARDIOVASCULAR:  Negative. RESPIRATORY:  Negative. GENITOURINARY: Per HPI. GASTROINTESTINAL:  Per HPI. INTEGUMENTARY (SKIN AND/OR BREAST):  Negative. MUSCULOSKELETAL: Per HPI.   ENDOCRINE/RHEUMATOLOGIC:  Negative. NEUROLOGICAL:  Per HPI. HEMATOLOGIC/LYMPHATIC:  Negative. ALLERGIC/IMMUNOLOGIC:  Negative. PSYCHIATRIC:  Negative. PHYSICAL EXAMINATION:   Visit Vitals  /72   Pulse 77   Temp 98.4 °F (36.9 °C)   Resp 22   Wt (!) 376 lb 3.2 oz (170.6 kg)   SpO2 94%   BMI 48.30 kg/m²    PAIN SCALE: 5/10    CONSTITUTIONAL: The patient is in no apparent distress and is alert and oriented x 3. HEENT: Normocephalic. Hearing grossly intact. NECK: Supple and symmetric. no tenderness, or masses were felt. RESPIRATORY: No labored breathing. CARDIOVASCULAR: The carotid pulses were normal. Peripheral pulses were 2+.   CHEST: Normal AP diameter and normal contour without any kyphoscoliosis. LYMPHATIC: No lymphadenopathy was appreciated in the neck, axillae or groin. SKIN:  Negative for scars, rashes, lesions, or ulcers on the right upper, right lower, left upper, left lower and trunk. NEUROLOGICAL: Alert and oriented x 3. Ambulation without assistive device. FWB. EXTREMITIES: See musculoskeletal.  MUSCULOSKELETAL:   Head and Neck: Neck pain. Negative for misalignment, asymmetry, crepitation, defects, tenderness masses or effusions.  Left Upper Extremity: Inspection, percussion and palpation performed. Websters sign is negative.  Right Upper Extremity: Inspection, percussion and palpation performed. Websters sign is negative.  Spine, Ribs and Pelvis: Back and hip pain. Inspection, percussion and palpation performed. Negative for misalignment, asymmetry, crepitation, defects, tenderness masses or effusions.  Left Lower Extremity: Inspection, percussion and palpation performed. Negative straight leg raise.  Right Lower Extremity: Inspection, percussion and palpation performed. Negative straight leg raise. SPINE EXAM:     Cervical spine: Neck is midline. Normal muscle tone. No focal atrophy is noted. Lumbar spine: No rash, ecchymosis, or gross obliquity. No focal atrophy is noted. ASSESSMENT    ICD-10-CM ICD-9-CM    1. Cervical pain M54.2 723.1    2. Body mass index (BMI) 45.0-49.9, adult [facility]) Z68.42 V85.42        Written by Orion Burleson, as dictated by Virgen Duvall MD.    I, Dr. Virgen Duvall MD, confirm that all documentation is accurate.

## 2019-08-20 NOTE — PROGRESS NOTES
Malachi Lynn presents today for   Chief Complaint   Patient presents with    Neck Pain     DT FU       Is someone accompanying this pt? Yes; Wife    Is the patient using any DME equipment during OV? No    Depression Screening:  3 most recent PHQ Screens 8/20/2019   Little interest or pleasure in doing things Not at all   Feeling down, depressed, irritable, or hopeless Several days   Total Score PHQ 2 1       Learning Assessment:  Learning Assessment 8/20/2019   PRIMARY LEARNER Patient   HIGHEST LEVEL OF EDUCATION - PRIMARY LEARNER  SOME COLLEGE   BARRIERS PRIMARY LEARNER -   [address]  CAREGIVER Yes   CO-LEARNER NAME Wife   PRIMARY LANGUAGE ENGLISH   LEARNER PREFERENCE PRIMARY DEMONSTRATION   ANSWERED BY Patient   RELATIONSHIP SELF         Coordination of Care:  1. Have you been to the ER, urgent care clinic since your last visit? Yes; Chest Pain/Pressure  Hospitalized since your last visit? Yes; Heart Attack    2. Have you seen or consulted any other health care providers outside of the [address] [place] [address] since your last visit? Yes; Nayely De Jesus Include any pap smears or colon screening.  No

## 2019-08-20 NOTE — PATIENT INSTRUCTIONS
Starting a Weight Loss Plan: Care Instructions  Your Care Instructions    If you are thinking about losing weight, it can be hard to know where to start. Your doctor can help you set up a weight loss plan that best meets your needs. You may want to take a class on nutrition or exercise, or join a weight loss support group. If you have questions about how to make changes to your eating or exercise habits, ask your doctor about seeing a registered dietitian or an exercise specialist.  It can be a big challenge to lose weight. But you do not have to make huge changes at once. Make small changes, and stick with them. When those changes become habit, add a few more changes. If you do not think you are ready to make changes right now, try to pick a date in the future. Make an appointment to see your doctor to discuss whether the time is right for you to start a plan. Follow-up care is a key part of your treatment and safety. Be sure to make and go to all appointments, and call your doctor if you are having problems. It's also a good idea to know your test results and keep a list of the medicines you take. How can you care for yourself at home? · Set realistic goals. Many people expect to lose much more weight than is likely. A weight loss of 5% to 10% of your body weight may be enough to improve your health. · Get family and friends involved to provide support. Talk to them about why you are trying to lose weight, and ask them to help. They can help by participating in exercise and having meals with you, even if they may be eating something different. · Find what works best for you. If you do not have time or do not like to cook, a program that offers meal replacement bars or shakes may be better for you. Or if you like to prepare meals, finding a plan that includes daily menus and recipes may be best.  · Ask your doctor about other health professionals who can help you achieve your weight loss goals. ?  A dietitian can help you make healthy changes in your diet. ? An exercise specialist or  can help you develop a safe and effective exercise program.  ? A counselor or psychiatrist can help you cope with issues such as depression, anxiety, or family problems that can make it hard to focus on weight loss. · Consider joining a support group for people who are trying to lose weight. Your doctor can suggest groups in your area. Where can you learn more? Go to http://nohelia-martina.info/. Enter J047 in the search box to learn more about \"Starting a Weight Loss Plan: Care Instructions. \"  Current as of: March 28, 2019  Content Version: 12.1  © 7073-4414 [employer]. Care instructions adapted under license by [employer] (which disclaims liability or warranty for this information). If you have questions about a medical condition or this instruction, always ask your healthcare professional. [place] [address] any warranty or liability for your use of this information. Learning About Low-Carbohydrate Diets for Weight Loss  What is a low-carbohydrate diet? Low-carb diets avoid foods that are high in carbohydrate. These high-carb foods include pasta, bread, rice, cereal, fruits, and starchy vegetables. Instead, these diets usually have you eat foods that are high in fat and protein. Many people lose weight quickly on a low-carb diet. But the early weight loss is water. People on this diet often gain the weight back after they start eating carbs again. Not all diet plans are safe or work well. A lot of the evidence shows that low-carb diets aren't healthy. That's because these diets often don't include healthy foods like fruits and vegetables. Losing weight safely means balancing protein, fat, and carbs with every meal and snack. And low-carb diets don't always provide the vitamins, minerals, and fiber you need.   If you have a serious medical condition, talk to your doctor before you try any diet. These conditions include kidney disease, heart disease, type 2 diabetes, high cholesterol, and high blood pressure. If you are pregnant, it may not be safe for your baby if you are on a low-carb diet. How can you lose weight safely? You might have heard that a diet plan helped another person lose weight. But that doesn't mean that it will work for you. It is very hard to stay on a diet that includes lots of big changes in your eating habits. If you want to get to a healthy weight and stay there, making healthy lifestyle changes will often work better than dieting. These steps can help. · Make a plan for change. Work with your doctor to create a plan that is right for you. · See a dietitian. He or she can show you how to make healthy changes in your eating habits. · Manage stress. If you have a lot of stress in your life, it can be hard to focus on making healthy changes to your daily habits. · Track your food and activity. You are likely to do better at losing weight if you keep track of what you eat and what you do. Follow-up care is a key part of your treatment and safety. Be sure to make and go to all appointments, and call your doctor if you are having problems. It's also a good idea to know your test results and keep a list of the medicines you take. Where can you learn more? Go to http://nohelia-martina.info/. Enter A121 in the search box to learn more about \"Learning About Low-Carbohydrate Diets for Weight Loss. \"  Current as of: November 7, 2018  Content Version: 12.1  © 3743-8438 [employer]. Care instructions adapted under license by [employer] (which disclaims liability or warranty for this information).  If you have questions about a medical condition or this instruction, always ask your healthcare professional. [address] any warranty or liability for your use of this information.

## 2019-09-03 ENCOUNTER — TELEPHONE (OUTPATIENT)
Dept: FAMILY MEDICINE CLINIC | Age: 54
End: 2019-09-03

## 2019-09-05 NOTE — TELEPHONE ENCOUNTER
Returned call to pt's wife, advised her that Alexia denied the cough medication and that he should take OTC Delsym. She said he had already tried that and that pt said don't worry about it because he is tired of dealing with it.

## 2019-09-16 DIAGNOSIS — R60.0 BILATERAL LEG EDEMA: ICD-10-CM

## 2019-09-17 RX ORDER — FUROSEMIDE 20 MG/1
TABLET ORAL
Qty: 30 TAB | Refills: 1 | Status: SHIPPED | OUTPATIENT
Start: 2019-09-17 | End: 2022-07-29

## 2019-09-17 RX ORDER — METFORMIN HYDROCHLORIDE 500 MG/1
TABLET, EXTENDED RELEASE ORAL
Qty: 90 TAB | Refills: 3 | Status: SHIPPED | OUTPATIENT
Start: 2019-09-17 | End: 2022-07-29

## 2019-09-23 DIAGNOSIS — K21.9 GASTROESOPHAGEAL REFLUX DISEASE WITHOUT ESOPHAGITIS: ICD-10-CM

## 2019-09-25 ENCOUNTER — TELEPHONE (OUTPATIENT)
Dept: FAMILY MEDICINE CLINIC | Age: 54
End: 2019-09-25

## 2019-09-25 RX ORDER — OMEPRAZOLE 40 MG/1
40 CAPSULE, DELAYED RELEASE ORAL DAILY
Qty: 90 CAP | Refills: 2 | Status: SHIPPED | OUTPATIENT
Start: 2019-09-25 | End: 2022-07-29

## 2019-09-25 NOTE — TELEPHONE ENCOUNTER
Patient's wife called with questions of a previous medication that patient had been on, spouse is requesting a call back.

## 2019-09-26 NOTE — TELEPHONE ENCOUNTER
Returned call to pt's wife, she states that Mr. Quinteros's pain management (Dr. Dean Cuellar) is trying to prescribe him Lyrica but insurance won't cover it unless he has tried and failed with Gabapentin. She states Dr. Shelley Lobato tried him on Gabapentin but it caused his ears to ring. Dr. Ti Steele office needs notes showing this. Notes from 03/02/2016 and 03/15/2016 were faxed to Dr. Ti Steele office, pt's wife was made aware.

## 2019-09-30 DIAGNOSIS — J44.9 MODERATE COPD (CHRONIC OBSTRUCTIVE PULMONARY DISEASE) (HCC): ICD-10-CM

## 2019-09-30 RX ORDER — BUDESONIDE AND FORMOTEROL FUMARATE DIHYDRATE 80; 4.5 UG/1; UG/1
2 AEROSOL RESPIRATORY (INHALATION) 2 TIMES DAILY
Qty: 1 INHALER | Refills: 5 | Status: SHIPPED | OUTPATIENT
Start: 2019-09-30 | End: 2022-07-29

## 2019-10-02 RX ORDER — ATORVASTATIN CALCIUM 80 MG/1
80 TABLET, FILM COATED ORAL DAILY
Qty: 90 TAB | Refills: 1 | Status: SHIPPED | OUTPATIENT
Start: 2019-10-02 | End: 2019-11-29

## 2019-10-02 RX ORDER — LISINOPRIL 20 MG/1
20 TABLET ORAL DAILY
Qty: 90 TAB | Refills: 1 | Status: SHIPPED | OUTPATIENT
Start: 2019-10-02 | End: 2019-11-29

## 2019-10-02 RX ORDER — ALBUTEROL SULFATE 90 UG/1
2 AEROSOL, METERED RESPIRATORY (INHALATION)
Qty: 1 INHALER | Refills: 1 | Status: SHIPPED | OUTPATIENT
Start: 2019-10-02 | End: 2019-11-19 | Stop reason: SDUPTHER

## 2019-10-10 ENCOUNTER — TELEPHONE (OUTPATIENT)
Dept: FAMILY MEDICINE CLINIC | Age: 54
End: 2019-10-10

## 2019-10-10 DIAGNOSIS — L91.8 SKIN TAG: Primary | ICD-10-CM

## 2019-11-19 RX ORDER — ALBUTEROL SULFATE 90 UG/1
AEROSOL, METERED RESPIRATORY (INHALATION)
Qty: 8.5 INHALER | Refills: 1 | Status: SHIPPED | OUTPATIENT
Start: 2019-11-19

## 2019-12-02 ENCOUNTER — OFFICE VISIT (OUTPATIENT)
Dept: FAMILY MEDICINE CLINIC | Age: 54
End: 2019-12-02

## 2019-12-02 VITALS
HEIGHT: 75 IN | WEIGHT: 315 LBS | BODY MASS INDEX: 39.17 KG/M2 | TEMPERATURE: 98.7 F | OXYGEN SATURATION: 97 % | SYSTOLIC BLOOD PRESSURE: 140 MMHG | RESPIRATION RATE: 18 BRPM | HEART RATE: 73 BPM | DIASTOLIC BLOOD PRESSURE: 74 MMHG

## 2019-12-02 DIAGNOSIS — E11.9 TYPE 2 DIABETES MELLITUS WITHOUT COMPLICATION, WITHOUT LONG-TERM CURRENT USE OF INSULIN (HCC): ICD-10-CM

## 2019-12-02 DIAGNOSIS — Z01.818 PREOP GENERAL PHYSICAL EXAM: Primary | ICD-10-CM

## 2019-12-02 DIAGNOSIS — J44.9 MODERATE COPD (CHRONIC OBSTRUCTIVE PULMONARY DISEASE) (HCC): ICD-10-CM

## 2019-12-02 DIAGNOSIS — G47.33 OSA (OBSTRUCTIVE SLEEP APNEA): ICD-10-CM

## 2019-12-02 NOTE — PATIENT INSTRUCTIONS
General Pre-Op for People With Diabetes  Your Care Instructions  Just because you have diabetes doesn't mean you can't have surgery if you need it. Surgery is safer now than ever before. But if you have diabetes, you may need to take extra care. Before your surgery, you may need to check your blood sugar more often. Your doctor may have you do this for at least 24 hours before and for 72 hours after your surgery. If you take insulin or other medicine for diabetes, your doctor will give you exact instructions about how to take them. It may not be the same as how you usually take them. Following is what many doctors advise. But each person is different. If you don't get instructions about your medicines, ask your doctor what to do. And make sure to ask about anything you don't understand. · If you take metformin, you may need to stop taking it 48 hours before surgery. And you may need to wait another 48 hours to start taking it again. · If you take diabetes medicines other than insulin, you may need to stop taking them on the morning of the surgery. · If you take short-acting insulin, you may need to stop taking it on the morning of the surgery. · If you take long-acting insulin, you may need to take only half of your usual dose on the morning of the surgery. Follow-up care is a key part of your treatment and safety. Be sure to make and go to all appointments, and call your doctor if you are having problems. It's also a good idea to know your test results and keep a list of the medicines you take. What happens before surgery?   Surgery can be stressful. This information will help you understand what you can expect. And it will help you safely prepare for surgery.   Preparing for surgery    · Understand exactly what surgery is planned, along with the risks, benefits, and other options. · Tell your doctors ALL the medicines, vitamins, supplements, and herbal remedies you take.  Some of these can increase the risk of bleeding or interact with anesthesia.     · If you take blood thinners, such as warfarin (Coumadin), clopidogrel (Plavix), or aspirin, be sure to talk to your doctor. He or she will tell you if you should stop taking these medicines before your surgery. Make sure that you understand exactly what your doctor wants you to do.     · Your doctor will tell you which medicines to take or stop before your surgery. You may need to stop taking certain medicines a week or more before surgery. So talk to your doctor as soon as you can.     · If you have an advance directive, let your doctor know. It may include a living will and a durable power of  for health care. Bring a copy to the hospital. If you don't have one, you may want to prepare one. It lets your doctor and loved ones know your health care wishes. Doctors advise that everyone prepare these papers before any type of surgery or procedure.     · You will get exact instructions about when to stop eating before your surgery. It is important to have an empty stomach before surgery. But this can also lead to low blood sugar. Be sure to talk to your doctor more about this.     · Check your blood sugar often in the hours before the surgery. What happens on the day of surgery? · Follow the instructions exactly about when to stop eating and drinking. If you don't, your surgery may be canceled. If your doctor told you to take your medicines on the day of surgery, take them with only a sip of water.     · Take a bath or shower before you come in for your surgery. Do not apply lotions, perfumes, deodorants, or nail polish.     · Do not shave the surgical site yourself.     · Take off all jewelry and piercings.  And take out contact lenses, if you wear them.    At the hospital or surgery center   · Bring a picture ID.     · The area for surgery is often marked to make sure there are no errors.     · You will be kept comfortable and safe by your anesthesia provider. The anesthesia may make you sleep. Or it may just numb the area being worked on. Going home   · Be sure you have someone to drive you home. Anesthesia and pain medicine make it unsafe for you to drive.     · You will be given more specific instructions about recovering from your surgery. They will cover things like diet, wound care, follow-up care, driving, and getting back to your normal routine.     · If you control your blood sugar, it will help you get better faster. It will also lower your risk of infection. When should you call your doctor? · You have questions or concerns.     · You do not understand how to prepare for your surgery.     · You become ill before surgery (such as fever, flu, or a cold).     · You need to reschedule or have changed your mind about having the surgery. Where can you learn more? Go to http://nohelia-martina.info/. Enter R837 in the search box to learn more about \"General Pre-Op for People With Diabetes. \"  Current as of: April 16, 2019  Content Version: 12.2  © 5442-7931 [employer]. Care instructions adapted under license by [employer] (which disclaims liability or warranty for this information). If you have questions about a medical condition or this instruction, always ask your healthcare professional. [address] any warranty or liability for your use of this information.

## 2019-12-02 NOTE — PROGRESS NOTES
Preoperative Evaluation    Date of Exam: 2019    Patient:  April rGubbs  Patient's :  1965  Referring Physician: Dr. Skylar Wright Date: 2019    April Grubbs  Is a 47 y.o.  male  who presents for preoperative evaluation. Evaluation of major predictors of cardiac complications:  low-risk surgery (vascular, open intraperitoneal, intrathoracic)  Patient has no history of ischemic heart disease, angina, anginal equivalents, diabetes requiring treatment with insulin, heart failure, cerebrovascular disease, preoperative serum creatinine > 2.0. Procedure/Surgery: Bilateral Tympanoplasty Tubes  Date of Procedure/Surgery: 2019  Surgeon: Dr. Erika Houston: [facility]  Primary Physician:   Latex Allergy: no    Recent use of: Was advised to stop taking ASA 2 weeks before surgery    Anesthesia Complications: None  History of abnormal bleeding : None  History of Blood Transfusions: no  Health Care Directive or Living Will: yes    PMH, PSH, Social Hx, Family Hx, Meds and Allergies     Past Surgical History:   Procedure Laterality Date    HX COLONOSCOPY  2015    4 polyps removed    HX HEENT      eye surgery as a child    HX ORTHOPAEDIC Left     left lateral meniscus amputated    HX UROLOGICAL Left 2017    [facility] Cysto, bilateral RPG, left URS and placement of left double J ureteral stent - Dr. Varela Parent.  HX UROLOGICAL  2017    [facility]. Cystoscopy, left ureteroscopy, holmium laser lithotripsy and left double-J ureteral stent removal. Dr. Varela Parent.       Social History     Socioeconomic History    Marital status:      Spouse name: Not on file    Number of children: Not on file    Years of education: Not on file    Highest education level: Not on file   Occupational History    Occupation:    Social Needs    Financial resource strain: Not on file    Food insecurity:     Worry: Not on file     Inability: Not on file    Transportation needs: Medical: Not on file     Non-medical: Not on file   Tobacco Use    Smoking status: Current Every Day Smoker     Packs/day: 2.00     Years: 45.00     Pack years: 90.00     Types: Cigarettes     Start date: 1/1/1970     Last attempt to quit: 7/13/2016     Years since quitting: 3.3    Smokeless tobacco: Never Used    Tobacco comment: DEC TO 1/2 PPD   Substance and Sexual Activity    Alcohol use: No     Alcohol/week: 0.0 standard drinks    Drug use: Yes     Types: Opiates, Prescription    Sexual activity: Not Currently     Partners: Female   Lifestyle    Physical activity:     Days per week: Not on file     Minutes per session: Not on file    Stress: Not on file   Relationships    Social connections:     Talks on phone: Not on file     Gets together: Not on file     Attends L[employer] service: Not on file     Active member of club or organization: Not on file     Attends meetings of clubs or organizations: Not on file     Relationship status: Not on file    Intimate partner violence:     Fear of current or ex partner: Not on file     Emotionally abused: Not on file     Physically abused: Not on file     Forced sexual activity: Not on file   Other Topics Concern     Service Not Asked    Blood Transfusions Not Asked    Caffeine Concern Not Asked    Occupational Exposure Not Asked   Erica Fluke Hazards Not Asked    Sleep Concern Not Asked    Stress Concern Not Asked    Weight Concern Not Asked    Special Diet Not Asked    Back Care Not Asked    Exercise Not Asked    Bike Helmet Not Asked   [address] Not Asked    Self-Exams Not Asked   Social History Narrative    Not on file     Family History   Problem Relation Age of Onset    Diabetes Mother     Stroke Mother     Arthritis-osteo Father     Hypertension Father     Arthritis-osteo Brother     Heart Attack Brother     Diabetes Maternal Grandmother      Current Outpatient Medications on File Prior to Visit   Medication Sig Dispense Refill    rosuvastatin (CRESTOR) 20 mg tablet rosuvastatin 20 mg tablet      valsartan (DIOVAN) 80 mg tablet TAKE 1 TABLET BY MOUTH EVERY DAY  1    pregabalin (LYRICA) 75 mg capsule Take 75 mg by mouth. Indications: Diabetic Complication causing Injury to some Body Nerves      methocarbamol (ROBAXIN) 750 mg tablet methocarbamol 750 mg tablet   TAKE 1 TABLET BY MOUTH FOUR TIMES A DAY AS NEEDED      albuterol (PROVENTIL HFA, VENTOLIN HFA, PROAIR HFA) 90 mcg/actuation inhaler INHALE 2 PUFFS BY MOUTH EVERY 4 HOURS AS NEEDED FOR WHEEZE 8.5 Inhaler 1    budesonide-formoterol (SYMBICORT) 80-4.5 mcg/actuation HFAA Take 2 Puffs by inhalation two (2) times a day. 1 Inhaler 5    omeprazole (PRILOSEC) 40 mg capsule Take 1 Cap by mouth daily. TAKE 1 CAPSULE DAILY 90 Cap 2    furosemide (LASIX) 20 mg tablet TAKE 1 TABLET BY MOUTH DAILY AS NEEDED FOR EDEMA 30 Tab 1    metFORMIN ER (GLUCOPHAGE XR) 500 mg tablet TAKE 1 TABLET BY MOUTH EVERY DAY WITH DINNER 90 Tab 3    oxyCODONE-acetaminophen (PERCOCET) 5-325 mg per tablet oxycodone-acetaminophen 5 mg-325 mg tablet   TAKE 1 TO 2 TABLETS EVERY 6 HOURS AS NEEDED FOR PAIN - MAX 8 DAILY      clopidogrel (PLAVIX) 75 mg tab Take  by mouth.  metoprolol tartrate (LOPRESSOR) 50 mg tablet Take  by mouth two (2) times a day.  isosorbide mononitrate ER (IMDUR) 60 mg CR tablet Take  by mouth every morning.  aspirin delayed-release 81 mg tablet Take  by mouth daily.  triamcinolone acetonide (KENALOG) 0.5 % ointment Apply  to affected area two (2) times daily as needed for Skin Irritation. use thin layer 30 g 0    fluticasone propionate (FLONASE) 50 mcg/actuation nasal spray SPRAY 2 SPRAYS INTO EACH NOSTRIL EVERY DAY  11    nitroglycerin (NITROSTAT) 0.4 mg SL tablet 0.4 mg by SubLINGual route.       potassium chloride (KLOR-CON M10) 10 mEq tablet Klor-Con M10 mEq tablet,extended release      glucose blood VI test strips (ASCENSIA AUTODISC VI, ONE TOUCH ULTRA TEST VI) strip Use to check sugars every other day fasting. 100 Strip 1    Blood-Glucose Meter monitoring kit Check blood sugar fasting every other day 1 Kit 0    lancets misc Use to check sugars every other day 1 Each 11     No current facility-administered medications on file prior to visit. Allergies   Allergen Reactions    Vicodin [Hydrocodone-Acetaminophen] Itching    Contrave [Naltrexone-Bupropion] Other (comments)     Depression    Morphine Other (comments)     Patient reports extreme burning in extremity with morphine injection. REVIEW OF SYSTEMS:    General: negative for - chills, fatigue, fever, weight change  Psych: negative for - anxiety, depression, irritability or mood swings  ENT: negative for - headaches, hearing change, nasal congestion, oral lesions, sneezing or sore throat  Heme/ Lymph: negative for - bleeding problems, bruising, pallor or swollen lymph nodes  Resp: negative for - cough, shortness of breath or wheezing  CV: negative for - chest pain, edema or palpitations  GI: negative for - abdominal pain, change in bowel habits, constipation, diarrhea or nausea/vomiting  : negative for - dysuria, hematuria, incontinence,   MSK: Back Pain; negative for - joint pain, joint swelling or muscle pain  Neuro: negative for - confusion, headaches, seizures or weakness  Derm: negative for - dry skin, hair changes, rash or skin lesion changes    EXAM:   Vitals:    12/02/19 1524   BP: 140/74   Pulse: 73   Resp: 18   Temp: 98.7 °F (37.1 °C)   TempSrc: Oral   SpO2: 97%   Weight: (!) 386 lb (175.1 kg)   Height: 6' 3\" (1.905 m)   PainSc:   5   PainLoc: Back       General:   well-nourished, well-groomed, pleasant, alert, in no acute distress.      Head:  Normocephalic, atraumatic, MMM  Ears:  External ears WNL, TMs WNL  Eyes:  EOMI, PERRL  Nose:  External nares WNL  Neck:  Neck supple with normal ROM for age, no thyromegaly  Cardiovasc:   Regular rate and rhythm, no murmurs, no rubs, no gallops  Pulmonary:   Clear breath sounds bilaterally, good air movement, no wheezing, no rales, no rhonchi, normal respiratory effort  Abdomen:   Abdomen soft, nontender, nondistended, NABS  Extremities:   No edema, warm and well-perfused  Neuro:   Alert, conversant, appropriate, following commands, no focal deficits  Skin:    No rash or skin changes  Psych:  Affect, mood and judgment appropriate    Labwork and Ancillary Studies     Lab Results   Component Value Date/Time    Hemoglobin A1c 7.8 (H) 12/04/2019 12:01 PM    Hemoglobin A1c (POC) 5.8 01/03/2018 09:27 AM    Hemoglobin A1c, External 5.8 11/04/2015     Lab Results   Component Value Date/Time    Sodium 138 12/04/2019 12:01 PM    Potassium 5.0 12/04/2019 12:01 PM    Chloride 104 12/04/2019 12:01 PM    CO2 31 12/04/2019 12:01 PM    Anion gap 4 (L) 12/04/2019 12:01 PM    Glucose 206 (H) 12/04/2019 12:01 PM    BUN 16 12/04/2019 12:01 PM    Creatinine 1.2 12/04/2019 12:01 PM    BUN/Creatinine ratio 16 11/14/2018 09:10 AM    GFR est AA >60 12/04/2019 12:01 PM    GFR est non-AA >60 12/04/2019 12:01 PM    Calcium 9.4 12/04/2019 12:01 PM    Bilirubin, total 0.6 11/14/2018 09:10 AM    AST (SGOT) 24 11/14/2018 09:10 AM    Alk. phosphatase 90 11/14/2018 09:10 AM    Protein, total 7.8 11/14/2018 09:10 AM    Albumin 4.0 11/14/2018 09:10 AM    Globulin 3.8 11/14/2018 09:10 AM    A-G Ratio 1.1 11/14/2018 09:10 AM    ALT (SGPT) 53 11/14/2018 09:10 AM     Lab Results   Component Value Date/Time    WBC 8.2 12/04/2019 12:01 PM    HGB 16.7 12/04/2019 12:01 PM    HCT 50.6 (H) 12/04/2019 12:01 PM    PLATELET 718 16/08/1072 12:01 PM    MCV 90.2 12/04/2019 12:01 PM     Lab Results   Component Value Date/Time    INR 1.0 12/04/2019 12:01 PM    Prothrombin time 11.1 12/04/2019 12:01 PM       Assessment/Plan & Orders:       1. Preop general physical exam  - HEMOGLOBIN A1C WITH EAG; Future    2.  Type 2 diabetes mellitus without complication, without long-term current use of insulin ([facility] [address]    3. Moderate COPD (chronic obstructive pulmonary disease) ([facility] [address]    4. TRE (obstructive sleep apnea)    IMPRESSION:   Low risk for planned surgery per Primary Care standpoint. No contraindications to planned surgery    Will follow up with Cardiology for cardiac clearance. Patient verbalized understanding and agreement with the plan. Patient was given an after-visit summary.       GUILHERME Dee  12/2/2019,  4:03 PM

## 2019-12-02 NOTE — PROGRESS NOTES
Felice Fofana is a 47 y.o. male here this afternoon for a pre-op clearance to have tubes put in his ears by Dr. Edward Sands on 12-11-19. He has his cardio and pulm appointments prior for their clearance.

## 2022-03-18 PROBLEM — R91.1 PULMONARY NODULE: Status: ACTIVE | Noted: 2017-12-08

## 2022-03-19 PROBLEM — F51.04 CHRONIC INSOMNIA: Status: ACTIVE | Noted: 2019-01-09

## 2022-03-19 PROBLEM — R22.2 ABDOMINAL WALL MASS: Status: ACTIVE | Noted: 2017-11-10

## 2022-03-19 PROBLEM — R06.09 DYSPNEA ON EXERTION: Status: ACTIVE | Noted: 2017-01-10

## 2022-09-19 NOTE — PROGRESS NOTES
Patient was given 10.75 mCi of Sestamibi for the Resting pictures. Patient received 0.4 mg of Lexiscan for the exercise portion of the Stress test. Patient was then given 33 mCi of Sestamibi for the Stress pictures. Patient went back to room with armband still on.
Per last office note: The patient needs to be screened for coronary artery disease. He will not be able to ambulate on a treadmill because of his degenerative joint disease and shortness of breath. Have ordered pharmacologic stress testing and Cardiolite.    
The patient's Cardiolite scan was negative for ischemia. No evidence for blocked coronary arteries.   Please let him know
Unable to reach patient to confirm appointment.
Statement Selected

## 2023-01-31 RX ORDER — ALBUTEROL SULFATE 90 UG/1
AEROSOL, METERED RESPIRATORY (INHALATION)
COMMUNITY
Start: 2019-11-19

## 2023-01-31 RX ORDER — ERGOCALCIFEROL 1.25 MG/1
CAPSULE ORAL
COMMUNITY

## 2023-01-31 RX ORDER — SEMAGLUTIDE 1.34 MG/ML
INJECTION, SOLUTION SUBCUTANEOUS
COMMUNITY

## 2023-01-31 RX ORDER — METAXALONE 400 MG/1
TABLET ORAL PRN
COMMUNITY

## 2023-01-31 RX ORDER — ASPIRIN 81 MG/1
TABLET ORAL DAILY
COMMUNITY

## 2023-01-31 RX ORDER — FLUTICASONE PROPIONATE AND SALMETEROL 232; 14 UG/1; UG/1
POWDER, METERED RESPIRATORY (INHALATION)
COMMUNITY

## 2023-01-31 RX ORDER — CLOPIDOGREL BISULFATE 75 MG/1
TABLET ORAL
COMMUNITY

## 2023-01-31 RX ORDER — ATORVASTATIN CALCIUM 80 MG/1
TABLET, FILM COATED ORAL
COMMUNITY

## 2023-01-31 RX ORDER — PANTOPRAZOLE SODIUM 40 MG/1
40 TABLET, DELAYED RELEASE ORAL
COMMUNITY

## 2023-01-31 RX ORDER — OXYCODONE HYDROCHLORIDE 5 MG/1
10 TABLET ORAL 3 TIMES DAILY
COMMUNITY

## 2023-01-31 RX ORDER — VALSARTAN 80 MG/1
TABLET ORAL
COMMUNITY
Start: 2019-09-11

## 2023-01-31 RX ORDER — EZETIMIBE 10 MG/1
TABLET ORAL
COMMUNITY

## 2023-01-31 RX ORDER — ISOSORBIDE MONONITRATE 60 MG/1
TABLET, EXTENDED RELEASE ORAL
COMMUNITY